# Patient Record
Sex: FEMALE | Race: WHITE | Employment: PART TIME | URBAN - METROPOLITAN AREA
[De-identification: names, ages, dates, MRNs, and addresses within clinical notes are randomized per-mention and may not be internally consistent; named-entity substitution may affect disease eponyms.]

---
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
## 2021-04-13 ENCOUNTER — HOSPITAL ENCOUNTER (EMERGENCY)
Age: 21
Discharge: HOME | End: 2021-04-13
Attending: EMERGENCY MEDICINE

## 2021-04-13 ENCOUNTER — APPOINTMENT (OUTPATIENT)
Dept: GENERAL RADIOLOGY | Age: 21
End: 2021-04-13
Attending: PHYSICIAN ASSISTANT

## 2021-04-13 VITALS
RESPIRATION RATE: 16 BRPM | WEIGHT: 130 LBS | DIASTOLIC BLOOD PRESSURE: 70 MMHG | BODY MASS INDEX: 23.92 KG/M2 | HEIGHT: 62 IN | HEART RATE: 92 BPM | SYSTOLIC BLOOD PRESSURE: 112 MMHG | OXYGEN SATURATION: 99 % | TEMPERATURE: 99.4 F

## 2021-04-13 DIAGNOSIS — S82.892D CLOSED FRACTURE OF LEFT ANKLE WITH ROUTINE HEALING, SUBSEQUENT ENCOUNTER: Primary | ICD-10-CM

## 2021-04-13 DIAGNOSIS — T07.XXXA MULTIPLE BRUISES: ICD-10-CM
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
## 2021-04-13 PROCEDURE — 96372 THER/PROPH/DIAG INJ SC/IM: CPT

## 2021-04-13 PROCEDURE — 74011250636 HC RX REV CODE- 250/636: Performed by: PHYSICIAN ASSISTANT

## 2021-04-13 PROCEDURE — 72220 X-RAY EXAM SACRUM TAILBONE: CPT

## 2021-04-13 PROCEDURE — 99283 EMERGENCY DEPT VISIT LOW MDM: CPT

## 2021-04-13 PROCEDURE — 73610 X-RAY EXAM OF ANKLE: CPT

## 2021-04-13 RX ORDER — ONDANSETRON 4 MG/1
4 TABLET, ORALLY DISINTEGRATING ORAL
Status: ON HOLD | COMMUNITY
Start: 2021-04-12 | End: 2021-04-19 | Stop reason: SDUPTHER

## 2021-04-13 RX ORDER — OXYCODONE HYDROCHLORIDE 5 MG/1
5 TABLET ORAL
COMMUNITY
Start: 2021-04-12 | End: 2021-04-19

## 2021-04-13 RX ORDER — METHOCARBAMOL 750 MG/1
750 TABLET, FILM COATED ORAL 3 TIMES DAILY
Qty: 30 TAB | Refills: 0 | Status: SHIPPED | OUTPATIENT
Start: 2021-04-13 | End: 2021-04-23

## 2021-04-13 RX ORDER — HYDROMORPHONE HYDROCHLORIDE 1 MG/ML
1 INJECTION, SOLUTION INTRAMUSCULAR; INTRAVENOUS; SUBCUTANEOUS
Status: COMPLETED | OUTPATIENT
Start: 2021-04-13 | End: 2021-04-13

## 2021-04-13 RX ADMIN — HYDROMORPHONE HYDROCHLORIDE 1 MG: 1 INJECTION, SOLUTION INTRAMUSCULAR; INTRAVENOUS; SUBCUTANEOUS at 21:28

## 2021-04-14 NOTE — ED NOTES
I have reviewed discharge instructions with the patient. The patient verbalized understanding. Patient left ED via Discharge Method: ambulatory to Home with parent. Opportunity for questions and clarification provided. Patient given 1 scripts.
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
## 2021-04-14 NOTE — DISCHARGE INSTRUCTIONS
Ice to all sore areas, use at home meds as directed alternate Tylenol Motrin for any breakthrough pain, may also use muscle relaxers for pain, use daily stool softner for constipation, call ortho office in am, case management will call you tomorrow to help with follow up appt
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
## 2021-04-18 ENCOUNTER — ANESTHESIA EVENT (OUTPATIENT)
Dept: SURGERY | Age: 21
End: 2021-04-18

## 2021-04-19 ENCOUNTER — APPOINTMENT (OUTPATIENT)
Dept: GENERAL RADIOLOGY | Age: 21
End: 2021-04-19
Attending: ORTHOPAEDIC SURGERY

## 2021-04-19 ENCOUNTER — ANESTHESIA (OUTPATIENT)
Dept: SURGERY | Age: 21
End: 2021-04-19

## 2021-04-19 ENCOUNTER — HOSPITAL ENCOUNTER (OUTPATIENT)
Age: 21
Setting detail: OUTPATIENT SURGERY
Discharge: HOME | End: 2021-04-19
Attending: ORTHOPAEDIC SURGERY | Admitting: ORTHOPAEDIC SURGERY
Payer: COMMERCIAL

## 2021-04-19 VITALS
RESPIRATION RATE: 16 BRPM | DIASTOLIC BLOOD PRESSURE: 65 MMHG | HEART RATE: 88 BPM | OXYGEN SATURATION: 98 % | BODY MASS INDEX: 24.14 KG/M2 | SYSTOLIC BLOOD PRESSURE: 104 MMHG | TEMPERATURE: 98.6 F | WEIGHT: 132 LBS

## 2021-04-19 DIAGNOSIS — S82.842A BIMALLEOLAR ANKLE FRACTURE, LEFT, CLOSED, INITIAL ENCOUNTER: Primary | ICD-10-CM

## 2021-04-19 LAB
ABO + RH BLD: NORMAL
BASOPHILS # BLD: 0 K/UL (ref 0–0.2)
BASOPHILS NFR BLD: 1 % (ref 0–2)
BLOOD GROUP ANTIBODIES SERPL: NORMAL
DIFFERENTIAL METHOD BLD: NORMAL
EOSINOPHIL # BLD: 0.4 K/UL (ref 0–0.8)
EOSINOPHIL NFR BLD: 5 % (ref 0.5–7.8)
ERYTHROCYTE [DISTWIDTH] IN BLOOD BY AUTOMATED COUNT: 12.7 % (ref 11.9–14.6)
HCG UR QL: NEGATIVE
HCT VFR BLD AUTO: 41 % (ref 35.8–46.3)
HGB BLD-MCNC: 13.8 G/DL (ref 11.7–15.4)
IMM GRANULOCYTES # BLD AUTO: 0 K/UL (ref 0–0.5)
IMM GRANULOCYTES NFR BLD AUTO: 0 % (ref 0–5)
LYMPHOCYTES # BLD: 2.4 K/UL (ref 0.5–4.6)
LYMPHOCYTES NFR BLD: 36 % (ref 13–44)
MCH RBC QN AUTO: 30.2 PG (ref 26.1–32.9)
MCHC RBC AUTO-ENTMCNC: 33.7 G/DL (ref 31.4–35)
MCV RBC AUTO: 89.7 FL (ref 79.6–97.8)
MONOCYTES # BLD: 0.6 K/UL (ref 0.1–1.3)
MONOCYTES NFR BLD: 9 % (ref 4–12)
NEUTS SEG # BLD: 3.4 K/UL (ref 1.7–8.2)
NEUTS SEG NFR BLD: 50 % (ref 43–78)
NRBC # BLD: 0 K/UL (ref 0–0.2)
PLATELET # BLD AUTO: 248 K/UL (ref 150–450)
PMV BLD AUTO: 11 FL (ref 9.4–12.3)
RBC # BLD AUTO: 4.57 M/UL (ref 4.05–5.2)
SPECIMEN EXP DATE BLD: NORMAL
WBC # BLD AUTO: 6.8 K/UL (ref 4.3–11.1)

## 2021-04-19 PROCEDURE — 77030040922 HC BLNKT HYPOTHRM STRY -A: Performed by: ANESTHESIOLOGY

## 2021-04-19 PROCEDURE — 76010000162 HC OR TIME 1.5 TO 2 HR INTENSV-TIER 1: Performed by: ORTHOPAEDIC SURGERY

## 2021-04-19 PROCEDURE — 74011000250 HC RX REV CODE- 250: Performed by: NURSE ANESTHETIST, CERTIFIED REGISTERED

## 2021-04-19 PROCEDURE — 86901 BLOOD TYPING SEROLOGIC RH(D): CPT

## 2021-04-19 PROCEDURE — 2709999900 HC NON-CHARGEABLE SUPPLY: Performed by: ORTHOPAEDIC SURGERY

## 2021-04-19 PROCEDURE — 73610 X-RAY EXAM OF ANKLE: CPT

## 2021-04-19 PROCEDURE — 77030010509 HC AIRWY LMA MSK TELE -A: Performed by: ANESTHESIOLOGY

## 2021-04-19 PROCEDURE — C1713 ANCHOR/SCREW BN/BN,TIS/BN: HCPCS | Performed by: ORTHOPAEDIC SURGERY

## 2021-04-19 PROCEDURE — 73600 X-RAY EXAM OF ANKLE: CPT

## 2021-04-19 PROCEDURE — 77030034760 HC NDL BN MAR ASPIR JAMSH STRY -B: Performed by: ORTHOPAEDIC SURGERY

## 2021-04-19 PROCEDURE — 81025 URINE PREGNANCY TEST: CPT

## 2021-04-19 PROCEDURE — 74011250636 HC RX REV CODE- 250/636: Performed by: NURSE ANESTHETIST, CERTIFIED REGISTERED

## 2021-04-19 PROCEDURE — 74011000250 HC RX REV CODE- 250: Performed by: ANESTHESIOLOGY

## 2021-04-19 PROCEDURE — 77030008462 HC STPLR SKN PROX J&J -A: Performed by: ORTHOPAEDIC SURGERY

## 2021-04-19 PROCEDURE — 74011250637 HC RX REV CODE- 250/637: Performed by: ORTHOPAEDIC SURGERY

## 2021-04-19 PROCEDURE — 77030017016 HC DSG ANTIMIC BARR2 S&N -B: Performed by: ORTHOPAEDIC SURGERY

## 2021-04-19 PROCEDURE — 27814 TREATMENT OF ANKLE FRACTURE: CPT | Performed by: ORTHOPAEDIC SURGERY

## 2021-04-19 PROCEDURE — 27829 TREAT LOWER LEG JOINT: CPT | Performed by: ORTHOPAEDIC SURGERY

## 2021-04-19 PROCEDURE — 74011250636 HC RX REV CODE- 250/636: Performed by: ORTHOPAEDIC SURGERY

## 2021-04-19 PROCEDURE — 85025 COMPLETE CBC W/AUTO DIFF WBC: CPT

## 2021-04-19 PROCEDURE — 76060000034 HC ANESTHESIA 1.5 TO 2 HR: Performed by: ORTHOPAEDIC SURGERY

## 2021-04-19 PROCEDURE — 77030033067 HC SUT PDO STRATFX SPIR J&J -B: Performed by: ORTHOPAEDIC SURGERY

## 2021-04-19 PROCEDURE — 76210000017 HC OR PH I REC 1.5 TO 2 HR: Performed by: ORTHOPAEDIC SURGERY

## 2021-04-19 PROCEDURE — 74011250637 HC RX REV CODE- 250/637: Performed by: ANESTHESIOLOGY

## 2021-04-19 PROCEDURE — 76010010054 HC POST OP PAIN BLOCK

## 2021-04-19 PROCEDURE — 77030003602 HC NDL NRV BLK BBMI -B: Performed by: ANESTHESIOLOGY

## 2021-04-19 PROCEDURE — 76210000020 HC REC RM PH II FIRST 0.5 HR: Performed by: ORTHOPAEDIC SURGERY

## 2021-04-19 PROCEDURE — 76942 ECHO GUIDE FOR BIOPSY: CPT | Performed by: ORTHOPAEDIC SURGERY

## 2021-04-19 PROCEDURE — 76010010054 HC POST OP PAIN BLOCK: Performed by: ORTHOPAEDIC SURGERY

## 2021-04-19 PROCEDURE — 77030003862 HC BIT DRL SYNT -B: Performed by: ORTHOPAEDIC SURGERY

## 2021-04-19 PROCEDURE — 77030000032 HC CUF TRNQT ZIMM -B: Performed by: ORTHOPAEDIC SURGERY

## 2021-04-19 PROCEDURE — 77030029637: Performed by: ORTHOPAEDIC SURGERY

## 2021-04-19 PROCEDURE — 74011250636 HC RX REV CODE- 250/636: Performed by: ANESTHESIOLOGY

## 2021-04-19 DEVICE — 3.5MM CORTEX SCREW SELF-TAPPING 12MM: Type: IMPLANTABLE DEVICE | Site: ANKLE | Status: FUNCTIONAL

## 2021-04-19 DEVICE — 3.5MM LOCKING SCREW SLF-TPNG W/STARDRIVE(TM) RECESS 16MM: Type: IMPLANTABLE DEVICE | Site: ANKLE | Status: FUNCTIONAL

## 2021-04-19 DEVICE — PLATE BNE L117MM 10 H S STL 1/3 TBLR LOK COMPR W/ CLLR FOR: Type: IMPLANTABLE DEVICE | Site: ANKLE | Status: FUNCTIONAL

## 2021-04-19 DEVICE — SCREW BNE L16MM DIA3.5MM CORT S STL ST NONCANNULATED LOK: Type: IMPLANTABLE DEVICE | Site: ANKLE | Status: FUNCTIONAL

## 2021-04-19 DEVICE — SCREW BNE L14MM DIA3.5MM CORT S STL ST LOK FULL THRD: Type: IMPLANTABLE DEVICE | Site: ANKLE | Status: FUNCTIONAL

## 2021-04-19 DEVICE — 3.5MM LOCKING SCREW SLF-TPNG W/STARDRIVE(TM) RECESS 12MM: Type: IMPLANTABLE DEVICE | Site: ANKLE | Status: FUNCTIONAL

## 2021-04-19 DEVICE — SCREW BNE L40MM DIA4MM CANC S STL PARTIALLY THRD SM HEX: Type: IMPLANTABLE DEVICE | Site: ANKLE | Status: FUNCTIONAL

## 2021-04-19 RX ORDER — SODIUM CHLORIDE, SODIUM LACTATE, POTASSIUM CHLORIDE, CALCIUM CHLORIDE 600; 310; 30; 20 MG/100ML; MG/100ML; MG/100ML; MG/100ML
150 INJECTION, SOLUTION INTRAVENOUS CONTINUOUS
Status: DISCONTINUED | OUTPATIENT
Start: 2021-04-19 | End: 2021-04-19 | Stop reason: HOSPADM

## 2021-04-19 RX ORDER — HYDROMORPHONE HYDROCHLORIDE 1 MG/ML
0.5 INJECTION, SOLUTION INTRAMUSCULAR; INTRAVENOUS; SUBCUTANEOUS
Status: DISCONTINUED | OUTPATIENT
Start: 2021-04-19 | End: 2021-04-19 | Stop reason: HOSPADM

## 2021-04-19 RX ORDER — PROPOFOL 10 MG/ML
INJECTION, EMULSION INTRAVENOUS AS NEEDED
Status: DISCONTINUED | OUTPATIENT
Start: 2021-04-19 | End: 2021-04-19 | Stop reason: HOSPADM

## 2021-04-19 RX ORDER — HYDROCODONE BITARTRATE AND ACETAMINOPHEN 5; 325 MG/1; MG/1
1 TABLET ORAL AS NEEDED
Status: DISCONTINUED | OUTPATIENT
Start: 2021-04-19 | End: 2021-04-19 | Stop reason: HOSPADM

## 2021-04-19 RX ORDER — DEXAMETHASONE SODIUM PHOSPHATE 4 MG/ML
INJECTION, SOLUTION INTRA-ARTICULAR; INTRALESIONAL; INTRAMUSCULAR; INTRAVENOUS; SOFT TISSUE
Status: DISCONTINUED | OUTPATIENT
Start: 2021-04-19 | End: 2021-04-19 | Stop reason: HOSPADM

## 2021-04-19 RX ORDER — SODIUM CHLORIDE 9 MG/ML
50 INJECTION, SOLUTION INTRAVENOUS CONTINUOUS
Status: DISCONTINUED | OUTPATIENT
Start: 2021-04-19 | End: 2021-04-19 | Stop reason: HOSPADM

## 2021-04-19 RX ORDER — SODIUM CHLORIDE 0.9 % (FLUSH) 0.9 %
5-40 SYRINGE (ML) INJECTION EVERY 8 HOURS
Status: DISCONTINUED | OUTPATIENT
Start: 2021-04-19 | End: 2021-04-19 | Stop reason: HOSPADM

## 2021-04-19 RX ORDER — DEXAMETHASONE SODIUM PHOSPHATE 4 MG/ML
INJECTION, SOLUTION INTRA-ARTICULAR; INTRALESIONAL; INTRAMUSCULAR; INTRAVENOUS; SOFT TISSUE AS NEEDED
Status: DISCONTINUED | OUTPATIENT
Start: 2021-04-19 | End: 2021-04-19 | Stop reason: HOSPADM

## 2021-04-19 RX ORDER — LIDOCAINE HYDROCHLORIDE 20 MG/ML
INJECTION, SOLUTION EPIDURAL; INFILTRATION; INTRACAUDAL; PERINEURAL AS NEEDED
Status: DISCONTINUED | OUTPATIENT
Start: 2021-04-19 | End: 2021-04-19 | Stop reason: HOSPADM

## 2021-04-19 RX ORDER — ACETAMINOPHEN 500 MG
1000 TABLET ORAL
Status: DISCONTINUED | OUTPATIENT
Start: 2021-04-19 | End: 2021-04-19 | Stop reason: HOSPADM

## 2021-04-19 RX ORDER — OXYCODONE AND ACETAMINOPHEN 5; 325 MG/1; MG/1
2 TABLET ORAL
Qty: 60 TAB | Refills: 0 | Status: SHIPPED | OUTPATIENT
Start: 2021-04-19 | End: 2021-04-24

## 2021-04-19 RX ORDER — FENTANYL CITRATE 50 UG/ML
100 INJECTION, SOLUTION INTRAMUSCULAR; INTRAVENOUS ONCE
Status: COMPLETED | OUTPATIENT
Start: 2021-04-19 | End: 2021-04-19

## 2021-04-19 RX ORDER — LIDOCAINE HYDROCHLORIDE 10 MG/ML
0.1 INJECTION INFILTRATION; PERINEURAL AS NEEDED
Status: DISCONTINUED | OUTPATIENT
Start: 2021-04-19 | End: 2021-04-19 | Stop reason: HOSPADM

## 2021-04-19 RX ORDER — FAMOTIDINE 20 MG/1
20 TABLET, FILM COATED ORAL ONCE
Status: COMPLETED | OUTPATIENT
Start: 2021-04-19 | End: 2021-04-19

## 2021-04-19 RX ORDER — SODIUM CHLORIDE 0.9 % (FLUSH) 0.9 %
5-40 SYRINGE (ML) INJECTION AS NEEDED
Status: DISCONTINUED | OUTPATIENT
Start: 2021-04-19 | End: 2021-04-19 | Stop reason: HOSPADM

## 2021-04-19 RX ORDER — CEFAZOLIN SODIUM/WATER 2 G/20 ML
2 SYRINGE (ML) INTRAVENOUS ONCE
Status: COMPLETED | OUTPATIENT
Start: 2021-04-19 | End: 2021-04-19

## 2021-04-19 RX ORDER — ACETAMINOPHEN 500 MG
1000 TABLET ORAL ONCE
Status: COMPLETED | OUTPATIENT
Start: 2021-04-19 | End: 2021-04-19

## 2021-04-19 RX ORDER — MIDAZOLAM HYDROCHLORIDE 1 MG/ML
2 INJECTION, SOLUTION INTRAMUSCULAR; INTRAVENOUS
Status: COMPLETED | OUTPATIENT
Start: 2021-04-19 | End: 2021-04-19

## 2021-04-19 RX ORDER — ONDANSETRON 2 MG/ML
INJECTION INTRAMUSCULAR; INTRAVENOUS AS NEEDED
Status: DISCONTINUED | OUTPATIENT
Start: 2021-04-19 | End: 2021-04-19 | Stop reason: HOSPADM

## 2021-04-19 RX ADMIN — Medication 3 AMPULE: at 06:03

## 2021-04-19 RX ADMIN — ONDANSETRON 4 MG: 2 INJECTION INTRAMUSCULAR; INTRAVENOUS at 08:48

## 2021-04-19 RX ADMIN — HYDROCODONE BITARTRATE AND ACETAMINOPHEN 1 TABLET: 5; 325 TABLET ORAL at 10:16

## 2021-04-19 RX ADMIN — DEXAMETHASONE SODIUM PHOSPHATE 4 MG: 4 INJECTION, SOLUTION INTRAMUSCULAR; INTRAVENOUS at 07:21

## 2021-04-19 RX ADMIN — ROPIVACAINE HYDROCHLORIDE 30 ML: 5 INJECTION, SOLUTION EPIDURAL; INFILTRATION; PERINEURAL at 07:25

## 2021-04-19 RX ADMIN — LIDOCAINE HYDROCHLORIDE 100 MG: 20 INJECTION, SOLUTION EPIDURAL; INFILTRATION; INTRACAUDAL; PERINEURAL at 07:40

## 2021-04-19 RX ADMIN — FENTANYL CITRATE 100 MCG: 50 INJECTION, SOLUTION INTRAMUSCULAR; INTRAVENOUS at 07:06

## 2021-04-19 RX ADMIN — SODIUM CHLORIDE, SODIUM LACTATE, POTASSIUM CHLORIDE, AND CALCIUM CHLORIDE: 600; 310; 30; 20 INJECTION, SOLUTION INTRAVENOUS at 08:45

## 2021-04-19 RX ADMIN — MIDAZOLAM 2 MG: 1 INJECTION INTRAMUSCULAR; INTRAVENOUS at 07:06

## 2021-04-19 RX ADMIN — DEXAMETHASONE SODIUM PHOSPHATE 4 MG: 4 INJECTION, SOLUTION INTRAMUSCULAR; INTRAVENOUS at 08:48

## 2021-04-19 RX ADMIN — ROPIVACAINE HYDROCHLORIDE 30 ML: 5 INJECTION, SOLUTION EPIDURAL; INFILTRATION; PERINEURAL at 07:21

## 2021-04-19 RX ADMIN — DEXAMETHASONE SODIUM PHOSPHATE 4 MG: 4 INJECTION, SOLUTION INTRAMUSCULAR; INTRAVENOUS at 07:25

## 2021-04-19 RX ADMIN — PROMETHAZINE HYDROCHLORIDE 3.25 MG: 25 INJECTION INTRAMUSCULAR; INTRAVENOUS at 10:42

## 2021-04-19 RX ADMIN — FAMOTIDINE 20 MG: 20 TABLET ORAL at 06:04

## 2021-04-19 RX ADMIN — CEFAZOLIN 2 G: 1 INJECTION, POWDER, FOR SOLUTION INTRAVENOUS at 07:49

## 2021-04-19 RX ADMIN — ACETAMINOPHEN 1000 MG: 500 TABLET ORAL at 06:04

## 2021-04-19 RX ADMIN — SODIUM CHLORIDE, SODIUM LACTATE, POTASSIUM CHLORIDE, AND CALCIUM CHLORIDE 150 ML/HR: 600; 310; 30; 20 INJECTION, SOLUTION INTRAVENOUS at 06:04

## 2021-04-19 RX ADMIN — PROPOFOL 200 MG: 10 INJECTION, EMULSION INTRAVENOUS at 07:40

## 2021-04-19 NOTE — ANESTHESIA POSTPROCEDURE EVALUATION
Procedure(s):  LEFT ANKLE OPEN REDUCTION INTERNAL FIXATION. general    Anesthesia Post Evaluation      Multimodal analgesia: multimodal analgesia used between 6 hours prior to anesthesia start to PACU discharge  Patient location during evaluation: bedside  Patient participation: complete - patient participated  Level of consciousness: awake and alert  Pain management: adequate  Airway patency: patent  Anesthetic complications: no  Cardiovascular status: hemodynamically stable  Respiratory status: spontaneous ventilation  Hydration status: euvolemic  Comments: Patient stable and may discharge at this time. Post anesthesia nausea and vomiting:  none  Final Post Anesthesia Temperature Assessment:  Normothermia (36.0-37.5 degrees C)  good result with popliteal and adductor canal blocks. INITIAL Post-op Vital signs:   Vitals Value Taken Time   /65 04/19/21 1101   Temp 37 °C (98.6 °F) 04/19/21 1101   Pulse 84 04/19/21 1102   Resp 18 04/19/21 1101   SpO2 97 % 04/19/21 1102   Vitals shown include unvalidated device data.
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
## 2021-04-19 NOTE — ANESTHESIA PROCEDURE NOTES
Peripheral Block Performed by: Familia Burks MD 
Authorized by: Familia Burks MD  
 
 
Block Type: Assessment: 
 
Injection Assessment:

## 2021-04-19 NOTE — INTERVAL H&P NOTE
Update History & Physical 
 
The Patient's History and Physical of 4/15/2021 was reviewed with the patient and I examined the patient. There was no change. The surgical site was confirmed by the patient and me. Plan:  The risk, benefits, expected outcome, and alternative to the recommended procedure have been discussed with the patient. Patient understands and wants to proceed with open reduction internal fixation of left ankle fracture.  
 
Electronically signed by May Litten, MD on 4/19/2021 at 7:02 AM

## 2021-04-19 NOTE — PROGRESS NOTES
Spiritual Care visit. Initial Visit, Pre Surgery Consult. Visit and prayer before patient goes to surgery.     Visit by Daisy Dean M.Ed., Th.B. ,Staff

## 2021-04-19 NOTE — ANESTHESIA PROCEDURE NOTES
Peripheral Block    Start time: 4/19/2021 7:21 AM  End time: 4/19/2021 7:25 AM  Performed by: Cynthia Matias MD  Authorized by: Cynthia Matias MD       Pre-procedure: Indications: at surgeon's request and post-op pain management    Preanesthetic Checklist: patient identified, risks and benefits discussed, site marked, timeout performed, anesthesia consent given and patient being monitored    Timeout Time: 07:21          Block Type:   Block Type: Adductor canal  Laterality:  Left  Monitoring:  Standard ASA monitoring, responsive to questions, oxygen, continuous pulse ox, frequent vital sign checks and heart rate  Injection Technique:  Single shot  Procedures: ultrasound guided    Patient Position: supine  Prep: chlorhexidine    Location:  Mid thigh  Needle Type:  Stimuplex  Needle Gauge:  22 G  Needle Localization:  Ultrasound guidance  Medication Injected:  Ropivacaine 0.5% with epinephrine 1:200,000 injection, 30 mL (Mixture components: EPINEPHrine HCl (PF) 1 mg/mL (1 mL) Soln, . 005 mL; ropivacaine (PF) 5 mg/mL (0.5 %) Soln, 1 mL)  dexamethasone (DECADRON) 4 mg/mL injection, 4 mg  Med Admin Time: 4/19/2021 7:25 AM    Assessment:  Number of attempts:  1  Injection Assessment:  Incremental injection every 5 mL, local visualized surrounding nerve on ultrasound, negative aspiration for blood, no intravascular symptoms, no paresthesia and ultrasound image on chart  Patient tolerance:  Patient tolerated the procedure well with no immediate complications  All needles out intact, proc. Tolerated well.

## 2021-04-19 NOTE — ANESTHESIA PREPROCEDURE EVALUATION
Relevant Problems   No relevant active problems       Anesthetic History               Review of Systems / Medical History  Patient summary reviewed and pertinent labs reviewed    Pulmonary            Asthma (occas inhaler, last was 2 weeks.)        Neuro/Psych   Within defined limits           Cardiovascular            Dysrhythmias (wpw with svt, last episode 2 years ago.)       Exercise tolerance: >4 METS     GI/Hepatic/Renal     GERD: well controlled           Endo/Other  Within defined limits           Other Findings              Physical Exam    Airway  Mallampati: II  TM Distance: 4 - 6 cm  Neck ROM: normal range of motion   Mouth opening: Normal     Cardiovascular  Regular rate and rhythm,  S1 and S2 normal,  no murmur, click, rub, or gallop  Rhythm: regular  Rate: normal         Dental  No notable dental hx       Pulmonary  Breath sounds clear to auscultation               Abdominal         Other Findings            Anesthetic Plan    ASA: 2  Anesthesia type: general      Post-op pain plan if not by surgeon: peripheral nerve block single    Induction: Intravenous  Anesthetic plan and risks discussed with: Patient and Mother

## 2021-04-19 NOTE — ANESTHESIA PROCEDURE NOTES
Peripheral Block    Start time: 4/19/2021 7:06 AM  End time: 4/19/2021 7:21 AM  Performed by: Luz Marina Lopez MD  Authorized by: Luz Marina Lopez MD       Pre-procedure: Indications: at surgeon's request and post-op pain management    Preanesthetic Checklist: patient identified, risks and benefits discussed, site marked, timeout performed, anesthesia consent given and patient being monitored    Timeout Time: 07:06          Block Type:   Block Type:  Popliteal  Laterality:  Left  Monitoring:  Standard ASA monitoring, responsive to questions, oxygen, continuous pulse ox, frequent vital sign checks and heart rate  Injection Technique:  Single shot  Procedures: ultrasound guided and nerve stimulator    Patient position: lateral decubitus. Prep: chlorhexidine    Location:  Mid thigh  Needle Type:  Stimuplex  Needle Gauge:  22 G  Needle Localization:  Nerve stimulator and ultrasound guidance  Motor Response comment:   Motor Response: minimal motor response >0.4 mA   Medication Injected:  Ropivacaine 0.375% with epi 1:200,000 in NS injection, 30 mL (Mixture components: ropivacaine (PF) 5 mg/mL (0.5 %) Soln, . 75 mL; EPINEPHrine HCl (PF) 1 mg/mL (1 mL) Soln, . 005 mL; 0.9% sodium chloride Soln, .245 mL)  dexamethasone (DECADRON) 4 mg/mL injection, 4 mg  Med Admin Time: 4/19/2021 7:21 AM    Assessment:  Number of attempts:  1  Injection Assessment:  Incremental injection every 5 mL, local visualized surrounding nerve on ultrasound, negative aspiration for blood, no intravascular symptoms, no paresthesia and ultrasound image on chart  Patient tolerance:  Patient tolerated the procedure well with no immediate complications  All needles out intact, proc. Tolerated well.

## 2021-04-19 NOTE — DISCHARGE INSTRUCTIONS
INSTRUCTIONS FOLLOWING FOOT SURGERY    ACTIVITY  Elevate foot (feet) for 48 hours. Use crutches as directed by your doctor. No weight bearing on operative foot. Get up out of bed frequently. While in the bed, move your legs as much as possible. DIET  Clear liquids until no nausea or vomiting; then light diet for the first day. Advance to regular diet on second day, unless your doctor orders otherwise. PAIN  Take pain medications as directed by your doctor. Call your doctor if pain is NOT relieved by medication. DO NOT take aspirin or blood thinners until directed by your doctor. DRESSING CARE  Keep clean and dry until follow up appointment. CALL YOUR DOCTOR IF YOU HAVE  Excessive bleeding that does not stop after holding mild pressure over the area. Temperature of 101 degrees or above. Loss of sensation - cold, white or blue toes. After general anesthesia or intravenous sedation, for 24 hours or while taking prescription Narcotics:  · Limit your activities  · A responsible adult needs to be with you for the next 24 hours  · Do not drive and operate hazardous machinery  · Do not make important personal or business decisions  · Do not drink alcoholic beverages  · If you have not urinated within 8 hours after discharge, and you are experiencing discomfort from urinary retention, please go to the nearest ED. · If you have sleep apnea and have a CPAP machine, please use it for all naps and sleeping. · Please use caution when taking narcotics and any of your home medications that may cause drowsiness. *  Please give a list of your current medications to your Primary Care Provider. *  Please update this list whenever your medications are discontinued, doses are      changed, or new medications (including over-the-counter products) are added. *  Please carry medication information at all times in case of emergency situations.     These are general instructions for a healthy lifestyle:  No smoking/ No tobacco products/ Avoid exposure to second hand smoke  Surgeon General's Warning:  Quitting smoking now greatly reduces serious risk to your health. Obesity, smoking, and sedentary lifestyle greatly increases your risk for illness  A healthy diet, regular physical exercise & weight monitoring are important for maintaining a healthy lifestyle    You may be retaining fluid if you have a history of heart failure or if you experience any of the following symptoms:  Weight gain of 3 pounds or more overnight or 5 pounds in a week, increased swelling in our hands or feet or shortness of breath while lying flat in bed. Please call your doctor as soon as you notice any of these symptoms; do not wait until your next office visit.

## 2021-04-24 ENCOUNTER — APPOINTMENT (OUTPATIENT)
Dept: CT IMAGING | Age: 21
End: 2021-04-24
Attending: EMERGENCY MEDICINE
Payer: COMMERCIAL

## 2021-04-24 ENCOUNTER — HOSPITAL ENCOUNTER (EMERGENCY)
Age: 21
Discharge: HOME | End: 2021-04-25
Attending: EMERGENCY MEDICINE
Payer: COMMERCIAL

## 2021-04-24 DIAGNOSIS — R40.4 TRANSIENT ALTERATION OF AWARENESS: Primary | ICD-10-CM

## 2021-04-24 DIAGNOSIS — N12 PYELONEPHRITIS: ICD-10-CM

## 2021-04-24 LAB
ALBUMIN SERPL-MCNC: 3.2 G/DL (ref 3.5–5)
ALBUMIN/GLOB SERPL: 0.7 {RATIO} (ref 1.2–3.5)
ALP SERPL-CCNC: 190 U/L (ref 50–130)
ALT SERPL-CCNC: 155 U/L (ref 12–65)
AMPHET UR QL SCN: NEGATIVE
ANION GAP SERPL CALC-SCNC: 5 MMOL/L (ref 7–16)
APAP SERPL-MCNC: 5 UG/ML (ref 10–30)
AST SERPL-CCNC: 155 U/L (ref 15–37)
BACTERIA URNS QL MICRO: ABNORMAL /HPF
BARBITURATES UR QL SCN: NEGATIVE
BASOPHILS # BLD: 0 K/UL (ref 0–0.2)
BASOPHILS NFR BLD: 0 % (ref 0–2)
BENZODIAZ UR QL: POSITIVE
BILIRUB SERPL-MCNC: 0.7 MG/DL (ref 0.2–1.1)
BUN SERPL-MCNC: 10 MG/DL (ref 6–23)
CALCIUM SERPL-MCNC: 9 MG/DL (ref 8.3–10.4)
CANNABINOIDS UR QL SCN: POSITIVE
CASTS URNS QL MICRO: ABNORMAL /LPF
CHLORIDE SERPL-SCNC: 106 MMOL/L (ref 98–107)
CO2 SERPL-SCNC: 26 MMOL/L (ref 21–32)
COCAINE UR QL SCN: NEGATIVE
CREAT SERPL-MCNC: 0.45 MG/DL (ref 0.6–1)
CRYSTALS URNS QL MICRO: ABNORMAL /LPF
DIFFERENTIAL METHOD BLD: ABNORMAL
EOSINOPHIL # BLD: 0.2 K/UL (ref 0–0.8)
EOSINOPHIL NFR BLD: 2 % (ref 0.5–7.8)
EPI CELLS #/AREA URNS HPF: ABNORMAL /HPF
ERYTHROCYTE [DISTWIDTH] IN BLOOD BY AUTOMATED COUNT: 13 % (ref 11.9–14.6)
ETHANOL SERPL-MCNC: <3 MG/DL
GLOBULIN SER CALC-MCNC: 4.3 G/DL (ref 2.3–3.5)
GLUCOSE SERPL-MCNC: 117 MG/DL (ref 65–100)
HCG UR QL: NEGATIVE
HCG UR QL: NEGATIVE
HCT VFR BLD AUTO: 37 % (ref 35.8–46.3)
HGB BLD-MCNC: 12.2 G/DL (ref 11.7–15.4)
IMM GRANULOCYTES # BLD AUTO: 0 K/UL (ref 0–0.5)
IMM GRANULOCYTES NFR BLD AUTO: 0 % (ref 0–5)
LACTATE SERPL-SCNC: 0.8 MMOL/L (ref 0.4–2)
LYMPHOCYTES # BLD: 1.3 K/UL (ref 0.5–4.6)
LYMPHOCYTES NFR BLD: 12 % (ref 13–44)
MAGNESIUM SERPL-MCNC: 2.3 MG/DL (ref 1.8–2.4)
MCH RBC QN AUTO: 29.4 PG (ref 26.1–32.9)
MCHC RBC AUTO-ENTMCNC: 33 G/DL (ref 31.4–35)
MCV RBC AUTO: 89.2 FL (ref 79.6–97.8)
METHADONE UR QL: NEGATIVE
MONOCYTES # BLD: 1 K/UL (ref 0.1–1.3)
MONOCYTES NFR BLD: 9 % (ref 4–12)
MUCOUS THREADS URNS QL MICRO: 0 /LPF
NEUTS SEG # BLD: 7.9 K/UL (ref 1.7–8.2)
NEUTS SEG NFR BLD: 76 % (ref 43–78)
NRBC # BLD: 0 K/UL (ref 0–0.2)
OPIATES UR QL: POSITIVE
PCP UR QL: NEGATIVE
PLATELET # BLD AUTO: 290 K/UL (ref 150–450)
PMV BLD AUTO: 10.9 FL (ref 9.4–12.3)
POTASSIUM SERPL-SCNC: 4.1 MMOL/L (ref 3.5–5.1)
PROT SERPL-MCNC: 7.5 G/DL (ref 6.3–8.2)
RBC # BLD AUTO: 4.15 M/UL (ref 4.05–5.2)
RBC #/AREA URNS HPF: ABNORMAL /HPF
SODIUM SERPL-SCNC: 137 MMOL/L (ref 136–145)
WBC # BLD AUTO: 10.4 K/UL (ref 4.3–11.1)
WBC URNS QL MICRO: ABNORMAL /HPF
YEAST URNS QL MICRO: ABNORMAL

## 2021-04-24 PROCEDURE — 82077 ASSAY SPEC XCP UR&BREATH IA: CPT

## 2021-04-24 PROCEDURE — 70450 CT HEAD/BRAIN W/O DYE: CPT

## 2021-04-24 PROCEDURE — 83605 ASSAY OF LACTIC ACID: CPT

## 2021-04-24 PROCEDURE — 51798 US URINE CAPACITY MEASURE: CPT

## 2021-04-24 PROCEDURE — 81025 URINE PREGNANCY TEST: CPT

## 2021-04-24 PROCEDURE — 85025 COMPLETE CBC W/AUTO DIFF WBC: CPT

## 2021-04-24 PROCEDURE — 74011250637 HC RX REV CODE- 250/637: Performed by: EMERGENCY MEDICINE

## 2021-04-24 PROCEDURE — 93005 ELECTROCARDIOGRAM TRACING: CPT | Performed by: EMERGENCY MEDICINE

## 2021-04-24 PROCEDURE — 83735 ASSAY OF MAGNESIUM: CPT

## 2021-04-24 PROCEDURE — 96365 THER/PROPH/DIAG IV INF INIT: CPT

## 2021-04-24 PROCEDURE — 51701 INSERT BLADDER CATHETER: CPT

## 2021-04-24 PROCEDURE — 80143 DRUG ASSAY ACETAMINOPHEN: CPT

## 2021-04-24 PROCEDURE — 74011000258 HC RX REV CODE- 258: Performed by: EMERGENCY MEDICINE

## 2021-04-24 PROCEDURE — 81003 URINALYSIS AUTO W/O SCOPE: CPT

## 2021-04-24 PROCEDURE — 80307 DRUG TEST PRSMV CHEM ANLYZR: CPT

## 2021-04-24 PROCEDURE — 80053 COMPREHEN METABOLIC PANEL: CPT

## 2021-04-24 PROCEDURE — 81001 URINALYSIS AUTO W/SCOPE: CPT

## 2021-04-24 PROCEDURE — 74011250636 HC RX REV CODE- 250/636: Performed by: EMERGENCY MEDICINE

## 2021-04-24 PROCEDURE — 96375 TX/PRO/DX INJ NEW DRUG ADDON: CPT

## 2021-04-24 PROCEDURE — 99285 EMERGENCY DEPT VISIT HI MDM: CPT

## 2021-04-24 RX ORDER — KETOROLAC TROMETHAMINE 30 MG/ML
30 INJECTION, SOLUTION INTRAMUSCULAR; INTRAVENOUS
Status: COMPLETED | OUTPATIENT
Start: 2021-04-24 | End: 2021-04-24

## 2021-04-24 RX ORDER — CEPHALEXIN 500 MG/1
500 CAPSULE ORAL 4 TIMES DAILY
Qty: 28 CAP | Refills: 0 | Status: SHIPPED | OUTPATIENT
Start: 2021-04-24 | End: 2021-05-04

## 2021-04-24 RX ORDER — HYDROCODONE BITARTRATE AND ACETAMINOPHEN 5; 325 MG/1; MG/1
1-2 TABLET ORAL
Qty: 20 TAB | Refills: 0 | Status: SHIPPED | OUTPATIENT
Start: 2021-04-24 | End: 2021-04-27

## 2021-04-24 RX ORDER — HYDROCODONE BITARTRATE AND ACETAMINOPHEN 10; 325 MG/1; MG/1
1 TABLET ORAL
Status: COMPLETED | OUTPATIENT
Start: 2021-04-24 | End: 2021-04-24

## 2021-04-24 RX ORDER — ONDANSETRON 8 MG/1
8 TABLET, ORALLY DISINTEGRATING ORAL
Qty: 12 TAB | Refills: 1 | Status: SHIPPED | OUTPATIENT
Start: 2021-04-24 | End: 2021-06-02

## 2021-04-24 RX ADMIN — KETOROLAC TROMETHAMINE 30 MG: 30 INJECTION, SOLUTION INTRAMUSCULAR; INTRAVENOUS at 21:59

## 2021-04-24 RX ADMIN — HYDROCODONE BITARTRATE AND ACETAMINOPHEN 1 TABLET: 10; 325 TABLET ORAL at 22:00

## 2021-04-24 RX ADMIN — CEFTRIAXONE 1 G: 1 INJECTION, POWDER, FOR SOLUTION INTRAMUSCULAR; INTRAVENOUS at 21:59

## 2021-04-24 RX ADMIN — SODIUM CHLORIDE 1000 ML: 900 INJECTION, SOLUTION INTRAVENOUS at 21:59
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
## 2021-04-24 NOTE — ED NOTES
Report from Ajith, [facility]. Assumed care of pt at this time.

## 2021-04-25 VITALS
DIASTOLIC BLOOD PRESSURE: 70 MMHG | RESPIRATION RATE: 18 BRPM | SYSTOLIC BLOOD PRESSURE: 119 MMHG | TEMPERATURE: 97.6 F | HEART RATE: 104 BPM | HEIGHT: 62 IN | OXYGEN SATURATION: 100 % | BODY MASS INDEX: 24.29 KG/M2 | WEIGHT: 132 LBS

## 2021-04-25 LAB
ATRIAL RATE: 75 BPM
CALCULATED P AXIS, ECG09: 41 DEGREES
CALCULATED R AXIS, ECG10: 58 DEGREES
CALCULATED T AXIS, ECG11: 25 DEGREES
DIAGNOSIS, 93000: NORMAL
P-R INTERVAL, ECG05: 154 MS
Q-T INTERVAL, ECG07: 354 MS
QRS DURATION, ECG06: 82 MS
QTC CALCULATION (BEZET), ECG08: 395 MS
VENTRICULAR RATE, ECG03: 75 BPM

## 2021-04-25 PROCEDURE — 74011250636 HC RX REV CODE- 250/636: Performed by: EMERGENCY MEDICINE

## 2021-04-25 RX ORDER — ONDANSETRON 2 MG/ML
4 INJECTION INTRAMUSCULAR; INTRAVENOUS
Status: COMPLETED | OUTPATIENT
Start: 2021-04-25 | End: 2021-04-25

## 2021-04-25 RX ADMIN — ONDANSETRON 4 MG: 2 INJECTION INTRAMUSCULAR; INTRAVENOUS at 00:34

## 2021-04-25 NOTE — ED NOTES
I have reviewed discharge instructions with the patient. The patient verbalized understanding. Patient left ED via Discharge Method: wheelchair to Home with family. Opportunity for questions and clarification provided. Patient given 3 scripts. To continue your aftercare when you leave the hospital, you may receive an automated call from our care team to check in on how you are doing. This is a free service and part of our promise to provide the best care and service to meet your aftercare needs.  If you have questions, or wish to unsubscribe from this service please call [phone]. Thank you for Choosing our [facility] Department.
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
## 2021-05-01 ENCOUNTER — HOSPITAL ENCOUNTER (EMERGENCY)
Age: 21
Discharge: HOME | End: 2021-05-01
Attending: EMERGENCY MEDICINE
Payer: COMMERCIAL

## 2021-05-01 VITALS
WEIGHT: 125 LBS | SYSTOLIC BLOOD PRESSURE: 101 MMHG | TEMPERATURE: 98.4 F | RESPIRATION RATE: 16 BRPM | HEART RATE: 96 BPM | OXYGEN SATURATION: 99 % | DIASTOLIC BLOOD PRESSURE: 71 MMHG | HEIGHT: 62 IN | BODY MASS INDEX: 23 KG/M2

## 2021-05-01 DIAGNOSIS — B37.31 VAGINAL CANDIDIASIS: Primary | ICD-10-CM

## 2021-05-01 LAB
BACTERIA URNS QL MICRO: 0 /HPF
CASTS URNS QL MICRO: 0 /LPF
CRYSTALS URNS QL MICRO: NORMAL /LPF
EPI CELLS #/AREA URNS HPF: NORMAL /HPF
MUCOUS THREADS URNS QL MICRO: NORMAL /LPF
RBC #/AREA URNS HPF: 0 /HPF
SERVICE CMNT-IMP: NORMAL
WBC URNS QL MICRO: 0 /HPF
WET PREP GENITAL: NORMAL
YEAST URNS QL MICRO: NORMAL

## 2021-05-01 PROCEDURE — 87210 SMEAR WET MOUNT SALINE/INK: CPT

## 2021-05-01 PROCEDURE — 99283 EMERGENCY DEPT VISIT LOW MDM: CPT

## 2021-05-01 PROCEDURE — 81015 MICROSCOPIC EXAM OF URINE: CPT

## 2021-05-01 PROCEDURE — 81003 URINALYSIS AUTO W/O SCOPE: CPT

## 2021-05-01 RX ORDER — FLUCONAZOLE 150 MG/1
150 TABLET ORAL
Qty: 2 TAB | Refills: 0 | Status: SHIPPED | OUTPATIENT
Start: 2021-05-01 | End: 2021-05-01

## 2021-05-01 NOTE — ED NOTES
I have reviewed discharge instructions with the patient. The patient verbalized understanding. Patient left ED via Discharge Method: ambulatory to Home with family. Opportunity for questions and clarification provided. Patient given 1 scripts. To continue your aftercare when you leave the hospital, you may receive an automated call from our care team to check in on how you are doing. This is a free service and part of our promise to provide the best care and service to meet your aftercare needs.  If you have questions, or wish to unsubscribe from this service please call [phone]. Thank you for Choosing our [address] [place] [address] Emergency Department.

## 2021-05-01 NOTE — ED TRIAGE NOTES
Presents complaining of white vaginal discharge, urinary pain, bilateral flank, lower back and abdominal pain. Reports recent UTI diagnosis. Mask applied.

## 2021-05-01 NOTE — ED PROVIDER NOTES
Patient is a 80-year-old female who presents to the emergency room with her mother who answers most of her questions for her, they are here because the patient is having some bilateral flank pain and some suprapubic pain. She was seen here recently for similar symptoms. Past Medical History:   Diagnosis Date    Asthma     Eczema     Esophagitis, eosinophilic     Gastrointestinal disorder     esophageal disorder    Other ill-defined conditions(459.89)     eczema    León-Parkinson-White syndrome        Past Surgical History:   Procedure Laterality Date    HX APPENDECTOMY  2016    HX CHOLECYSTECTOMY  2016         No family history on file.     Social History     Socioeconomic History    Marital status: SINGLE     Spouse name: Not on file    Number of children: Not on file    Years of education: Not on file    Highest education level: Not on file   Occupational History    Not on file   Social Needs    Financial resource strain: Not on file    Food insecurity     Worry: Not on file     Inability: Not on file    Transportation needs     Medical: Not on file     Non-medical: Not on file   Tobacco Use    Smoking status: Never Smoker    Smokeless tobacco: Never Used   Substance and Sexual Activity    Alcohol use: No    Drug use: Yes     Types: Marijuana    Sexual activity: Never   Lifestyle    Physical activity     Days per week: Not on file     Minutes per session: Not on file    Stress: Not on file   Relationships    Social connections     Talks on phone: Not on file     Gets together: Not on file     Attends Rastafari service: Not on file     Active member of club or organization: Not on file     Attends meetings of clubs or organizations: Not on file     Relationship status: Not on file    Intimate partner violence     Fear of current or ex partner: Not on file     Emotionally abused: Not on file     Physically abused: Not on file     Forced sexual activity: Not on file   Other Topics Concern    Not on file   Social History Narrative    Not on file         ALLERGIES: Latex and Pork derived (porcine)    Review of Systems   Constitutional: Negative for chills and fever. HENT: Negative for facial swelling. Respiratory: Negative for chest tightness and shortness of breath. Cardiovascular: Negative for chest pain. Gastrointestinal: Negative for abdominal pain, nausea and vomiting. Genitourinary: Positive for decreased urine volume and vaginal discharge. Negative for dysuria. Musculoskeletal: Negative for myalgias. Neurological: Negative for headaches. Psychiatric/Behavioral: Negative for confusion. All other systems reviewed and are negative. Vitals:    05/01/21 0932   BP: 101/71   Pulse: 96   Resp: 16   Temp: 98.4 °F (36.9 °C)   SpO2: 99%   Weight: 56.7 kg (125 lb)   Height: 5' 2\" (1.575 m)            Physical Exam  Vitals signs and nursing note reviewed. Constitutional:       Appearance: Normal appearance. HENT:      Head: Normocephalic and atraumatic. Mouth/Throat:      Mouth: Mucous membranes are moist.   Eyes:      Pupils: Pupils are equal, round, and reactive to light. Cardiovascular:      Rate and Rhythm: Normal rate and regular rhythm. Pulmonary:      Effort: No respiratory distress. Breath sounds: Normal breath sounds. Abdominal:      Palpations: Abdomen is soft. Tenderness: There is no abdominal tenderness. There is right CVA tenderness and left CVA tenderness. There is no guarding. Genitourinary:     Comments: Exam deferred  Skin:     General: Skin is warm and dry. Neurological:      Mental Status: She is alert and oriented to person, place, and time. Mental status is at baseline. Psychiatric:         Mood and Affect: Mood normal.          MDM  Number of Diagnoses or Management Options  Diagnosis management comments:  This is a 61-year-old female who presents to emergency room with chief complaint of bilateral flank pain, suprapubic pain and some scant white discharge. She was seen here in this ED several days ago, given antibiotics for UTI at that time. Patient states she took her prescription, was not able to keep the first few doses down due to nausea. Today her urinalysis is unremarkable, it is negative for nitrites, leukocytes, bacteria, however there is a concentrated specific gravity. Patient endorses drinking very little the last several days. Wet prep demonstrates presence of yeast, does not show any white blood cells, clue cells, trichomonas. Will treat for candidal infection.          Procedures

## 2021-06-02 ENCOUNTER — HOSPITAL ENCOUNTER (EMERGENCY)
Age: 21
Discharge: HOME | DRG: 493 | End: 2021-06-02
Attending: EMERGENCY MEDICINE
Payer: COMMERCIAL

## 2021-06-02 VITALS
TEMPERATURE: 99 F | HEIGHT: 62 IN | DIASTOLIC BLOOD PRESSURE: 80 MMHG | SYSTOLIC BLOOD PRESSURE: 118 MMHG | WEIGHT: 120 LBS | HEART RATE: 85 BPM | OXYGEN SATURATION: 100 % | BODY MASS INDEX: 22.08 KG/M2 | RESPIRATION RATE: 18 BRPM

## 2021-06-02 DIAGNOSIS — T81.30XA WOUND DEHISCENCE: Primary | ICD-10-CM

## 2021-06-02 PROCEDURE — 99282 EMERGENCY DEPT VISIT SF MDM: CPT

## 2021-06-02 RX ORDER — ALBUTEROL SULFATE 90 UG/1
AEROSOL, METERED RESPIRATORY (INHALATION)
COMMUNITY
Start: 2021-03-29 | End: 2021-10-16

## 2021-06-02 RX ORDER — SERTRALINE HYDROCHLORIDE 100 MG/1
100 TABLET, FILM COATED ORAL
COMMUNITY

## 2021-06-03 ENCOUNTER — ANESTHESIA EVENT (OUTPATIENT)
Dept: SURGERY | Age: 21
DRG: 493 | End: 2021-06-03
Payer: COMMERCIAL

## 2021-06-03 NOTE — H&P (VIEW-ONLY)
Consult    Patient: Chad Gustafson MRN: [id number]  SSN: [id number]    YOB: 2000  Age: 21 y.o. Sex: female      Subjective:      Chad Gustafson is a 21 y.o. female is about 6 weeks out from open reduction internal fixation of a left bimalleolar ankle fracture with fixation of her left distal tib-fib syndesmosis. She is here today with drainage from her left lateral wound. In roughly the central portion of her wound she is having purulent drainage from this. She is having pain but she has had pain ever since the time of her surgery she was hard to know if this is really worse or not. She has not had any obvious fevers. Past Medical History:   Diagnosis Date    Asthma     Eczema     Esophagitis, eosinophilic     Gastrointestinal disorder     esophageal disorder    Other ill-defined conditions(799.89)     eczema    León-Parkinson-White syndrome      Past Surgical History:   Procedure Laterality Date    HX APPENDECTOMY  2016    HX CHOLECYSTECTOMY  2016      FAMHX -No history of inflammatory arthritis   Social History     Tobacco Use    Smoking status: Never Smoker    Smokeless tobacco: Never Used   Substance Use Topics    Alcohol use: No      Current Outpatient Medications   Medication Sig Dispense Refill    albuterol (PROVENTIL HFA, VENTOLIN HFA, PROAIR HFA) 90 mcg/actuation inhaler TAKE 2 PUFFS, 2 4 TIMES PER DAY AS NEEDED.  sertraline (ZOLOFT) 100 mg tablet 100 mg.      ALBUTEROL 90 mcg/Actuation inhaler take 2 Puffs by inhalation every six (6) hours as needed for Wheezing. Allergies   Allergen Reactions    Latex Rash    Pork Derived (Porcine) Other (comments)     Because of GI disorder       Review of Systems:  A comprehensive review of systems was negative except for that written in the History of Present Illness. Objective: There were no vitals filed for this visit.      Physical Exam:  Physical Exam:  General:  Alert, cooperative, no distress, appears stated age. Orientation she is alert and oriented person place time and situation   Eyes:  Conjunctivae/corneas clear. PERRL, EOMs intact. Fundi benign   Ears:  Normal TMs and external ear canals both ears. Nose: Nares normal. Septum midline. Mucosa normal. No drainage or sinus tenderness. Mouth/Throat: Lips, mucosa, and tongue normal. Teeth and gums normal.   Neck: Supple, symmetrical, trachea midline, no adenopathy, thyroid: no enlargment/tenderness/nodules, no carotid bruit and no JVD. Back:   Symmetric, no curvature. ROM normal. No CVA tenderness. Lungs:   Clear to auscultation bilaterally. Heart:  Regular rate and rhythm, S1, S2 normal, no murmur, click, rub or gallop. Abdomen:   Soft, non-tender. Bowel sounds normal. No masses,  No organomegaly. No lymphadenopathy in all 4 extremities  Alignmentshe has near normal alignment of her left lower extremity  Range of motionshe does have some limited range of motion of her left ankle she can only dorsiflex to about neutral she has about 30 degrees or so plantarflexion  Vasculardistal pulses palpable in left lower extremity  Sensory/motordeep tendon reflexes normal left lower extremity. Motor and sensory function intact. Stabilityno evidence of any instability left ankle  Tenderness to palpation throughout the left ankle area  Skinher surgical incision is healed except for the area in the very central portion of her lateral incision which shows purulent drainage coming from this  Gait-she has obvious pain with any sort of weightbearing    Assessment:     Hospital Problems  Date Reviewed: 5/18/2021    None        Number 1-6 weeks out from open reduction internal fixation of left bimalleolar ankle fracture #2postoperative wound infection left ankle    Xrays and or studies:    3 views of the left ankle shows a left lateral malleolus fracture is well aligned and the hardware is intact.   She has very limited evidence of healing at her primary fracture lines on her lateral malleolus and  fibular shaft. Her medial side shows no evidence of any problems it does appear that her medial side is healing and her hardware appears to be intact impressionwell aligned left bimalleolar ankle fracture  Plan:     Obviously I am very concerned about the fact she has frankly purulent drainage from her left lateral malleolus. I have spoke with her regarding the fact that I think the most reasonable thing would be to take her back to the operating room tomorrow for we can go through her previous incision laterally and explore this. One of the most important things we will do will be to take surgical cultures so that we can tell for sure if this is infected and what organism is responsible and obviously what antibiotic is best for this. I think I would likely remove her hardware laterally and her tight rope syndesmosis fixation. I am hopeful that she is healed enough where we can do this and not lose any of her reduction.     Signed By: Luzma Hernandez MD     Bettina 3, 2021

## 2021-06-03 NOTE — ED PROVIDER NOTES
Patient is a 26-year-old female who is about 6 weeks out from bimalleolar ankle fracture repair by Dr. Clarisa Melo. She has had some erythema and skin irritation on the lateral left ankle at the surgical site for the past few weeks. She states it then became a blister tonight the area opened and drained some pus and serous fluid. Patient spoke with [facility] on-call earlier tonight and was directed here to the ER. The history is provided by the patient. Drainage from Incision   This is a new problem. The current episode started 3 to 5 hours ago. There has been no fever. The rash is present on the left ankle. She has tried nothing for the symptoms. Past Medical History:   Diagnosis Date    Asthma     Eczema     Esophagitis, eosinophilic     Gastrointestinal disorder     esophageal disorder    Other ill-defined conditions(209.26)     eczema    León-Parkinson-White syndrome        Past Surgical History:   Procedure Laterality Date    HX APPENDECTOMY  2016    HX CHOLECYSTECTOMY  2016         History reviewed. No pertinent family history. Social History     Socioeconomic History    Marital status: SINGLE     Spouse name: Not on file    Number of children: Not on file    Years of education: Not on file    Highest education level: Not on file   Occupational History    Not on file   Tobacco Use    Smoking status: Never Smoker    Smokeless tobacco: Never Used   Vaping Use    Vaping Use: Never used   Substance and Sexual Activity    Alcohol use: No    Drug use: Yes     Types: Marijuana    Sexual activity: Never   Other Topics Concern    Not on file   Social History Narrative    Not on file     Social Determinants of Health     Financial Resource Strain:     Difficulty of Paying Living Expenses:    Food Insecurity:     Worried About Running Out of Food in the Last Year:     [address] in the Last Year:    Transportation Needs:     Lack of Transportation (Medical):      Lack of Transportation (Non-Medical):    Physical Activity:     Days of Exercise per Week:     Minutes of Exercise per Session:    Stress:     Feeling of Stress :    Social Connections:     Frequency of Communication with Friends and Family:     Frequency of Social Gatherings with Friends and Family:     Attends Holiness Services:     Active Member of Clubs or Organizations:     Attends Club or Organization Meetings:     Marital Status:    Intimate Partner Violence:     Fear of Current or Ex-Partner:     Emotionally Abused:     Physically Abused:     Sexually Abused: ALLERGIES: Latex and Pork derived (porcine)    Review of Systems   Constitutional: Negative for chills, fatigue and fever. HENT: Negative for congestion, rhinorrhea and sore throat. Eyes: Negative for pain, discharge and visual disturbance. Respiratory: Negative for cough and shortness of breath. Cardiovascular: Negative for chest pain and palpitations. Gastrointestinal: Negative for abdominal pain, diarrhea and nausea. Endocrine: Negative for polydipsia and polyuria. Genitourinary: Negative for dysuria, frequency and urgency. Musculoskeletal: Negative for back pain and neck pain. Skin: Positive for wound. Negative for rash. Neurological: Negative for seizures, syncope and weakness. Hematological: Negative. Vitals:    06/02/21 2112   BP: 118/80   Pulse: 85   Resp: 18   Temp: 99 °F (37.2 °C)   SpO2: 100%   Weight: 54.4 kg (120 lb)   Height: 5' 2\" (1.575 m)            Physical Exam  Vitals and nursing note reviewed. Constitutional:       Appearance: Normal appearance. Cardiovascular:      Pulses: Normal pulses. Musculoskeletal:         General: No swelling. Right lower leg: No edema. Left lower leg: No edema. Skin:     Capillary Refill: Capillary refill takes less than 2 seconds. Findings: Erythema present.       Comments: Healing surgical incision on the lateral left ankle in the middle of the incision there is a small area of dehiscence about 3 mm in size there is a small amount of pus and some serous fluid draining out. Some minimal erythema around the wound. No calf tenderness or swelling. Neurological:      General: No focal deficit present. Mental Status: She is alert. Sensory: No sensory deficit. Motor: No weakness. MDM  Number of Diagnoses or Management Options  Diagnosis management comments: I wore appropriate PPE throughout this patient's ED visit. Rob Larry MD, 9:48 PM    Patient is afebrile with normal vital signs she is well and nontoxic appearing I sent an image to the on-call orthopedic physician assistant for review we will also try to reach her surgeon. 10:13 PM  Nonadherent dressing, eat ABD pad and a James wrap was placed around her surgical wound. She is to follow-up with her orthopedic surgeon tomorrow morning as scheduled. Voice dictation software was used during the making of this note. This software is not perfect and grammatical and other typographical errors may be present. This note has been proofread, but may still contain errors.   Rob Larry MD; 6/2/2021 @10:13 PM   ===================================================================         Amount and/or Complexity of Data Reviewed  Review and summarize past medical records: yes  Discuss the patient with other providers: yes    Risk of Complications, Morbidity, and/or Mortality  Presenting problems: low  Diagnostic procedures: minimal  Management options: minimal    Patient Progress  Patient progress: stable         Procedures

## 2021-06-03 NOTE — ED NOTES
I have reviewed discharge instructions with the patient. The patient verbalized understanding. Patient left ED via Discharge Method: ambulatory to Home with family. Opportunity for questions and clarification provided. Patient given 0 scripts. To continue your aftercare when you leave the hospital, you may receive an automated call from our care team to check in on how you are doing. This is a free service and part of our promise to provide the best care and service to meet your aftercare needs.  If you have questions, or wish to unsubscribe from this service please call [phone]. Thank you for Choosing our [facility] Emergency Department.

## 2021-06-03 NOTE — ED TRIAGE NOTES
Patient presents with c/o abcess around incision site of left ankle that has been draining. Patient had surgery with Dr. Jaky Coley on April 19 and has an appointment with him tomorrow morning. States the pain just getting worse.  Patient masked on arrival.

## 2021-06-04 ENCOUNTER — HOSPITAL ENCOUNTER (INPATIENT)
Age: 21
LOS: 4 days | Discharge: HOME | DRG: 493 | End: 2021-06-08
Attending: ORTHOPAEDIC SURGERY | Admitting: ORTHOPAEDIC SURGERY
Payer: COMMERCIAL

## 2021-06-04 ENCOUNTER — APPOINTMENT (OUTPATIENT)
Dept: GENERAL RADIOLOGY | Age: 21
DRG: 493 | End: 2021-06-04
Attending: ORTHOPAEDIC SURGERY
Payer: COMMERCIAL

## 2021-06-04 ENCOUNTER — HOSPITAL ENCOUNTER (INPATIENT)
Dept: GENERAL RADIOLOGY | Age: 21
Setting detail: SURGERY ADMIT
Discharge: HOME | DRG: 493 | End: 2021-06-04
Attending: ORTHOPAEDIC SURGERY
Payer: COMMERCIAL

## 2021-06-04 ENCOUNTER — ANESTHESIA (OUTPATIENT)
Dept: SURGERY | Age: 21
DRG: 493 | End: 2021-06-04
Payer: COMMERCIAL

## 2021-06-04 DIAGNOSIS — T84.7XXA HARDWARE COMPLICATING WOUND INFECTION, INITIAL ENCOUNTER (HCC): Primary | ICD-10-CM

## 2021-06-04 PROBLEM — I95.9 HYPOTENSION: Status: ACTIVE | Noted: 2021-06-04

## 2021-06-04 LAB
ABO + RH BLD: NORMAL
BASOPHILS # BLD: 0 K/UL (ref 0–0.2)
BASOPHILS NFR BLD: 1 % (ref 0–2)
BLOOD GROUP ANTIBODIES SERPL: NORMAL
CREAT SERPL-MCNC: 0.58 MG/DL (ref 0.6–1)
CRP SERPL HS-MCNC: 12.1 MG/L
DIFFERENTIAL METHOD BLD: NORMAL
EOSINOPHIL # BLD: 0.3 K/UL (ref 0–0.8)
EOSINOPHIL NFR BLD: 4 % (ref 0.5–7.8)
ERYTHROCYTE [DISTWIDTH] IN BLOOD BY AUTOMATED COUNT: 13.6 % (ref 11.9–14.6)
ERYTHROCYTE [SEDIMENTATION RATE] IN BLOOD: 24 MM/HR (ref 0–20)
HCT VFR BLD AUTO: 36.7 % (ref 35.8–46.3)
HGB BLD-MCNC: 12.1 G/DL (ref 11.7–15.4)
IMM GRANULOCYTES # BLD AUTO: 0 K/UL (ref 0–0.5)
IMM GRANULOCYTES NFR BLD AUTO: 0 % (ref 0–5)
LYMPHOCYTES # BLD: 2.7 K/UL (ref 0.5–4.6)
LYMPHOCYTES NFR BLD: 36 % (ref 13–44)
MCH RBC QN AUTO: 29.9 PG (ref 26.1–32.9)
MCHC RBC AUTO-ENTMCNC: 33 G/DL (ref 31.4–35)
MCV RBC AUTO: 90.6 FL (ref 79.6–97.8)
MONOCYTES # BLD: 0.5 K/UL (ref 0.1–1.3)
MONOCYTES NFR BLD: 7 % (ref 4–12)
NEUTS SEG # BLD: 3.8 K/UL (ref 1.7–8.2)
NEUTS SEG NFR BLD: 52 % (ref 43–78)
NRBC # BLD: 0 K/UL (ref 0–0.2)
PLATELET # BLD AUTO: 250 K/UL (ref 150–450)
PMV BLD AUTO: 10.8 FL (ref 9.4–12.3)
RBC # BLD AUTO: 4.05 M/UL (ref 4.05–5.2)
SPECIMEN EXP DATE BLD: NORMAL
WBC # BLD AUTO: 7.3 K/UL (ref 4.3–11.1)

## 2021-06-04 PROCEDURE — 76010000161 HC OR TIME 1 TO 1.5 HR INTENSV-TIER 1: Performed by: ORTHOPAEDIC SURGERY

## 2021-06-04 PROCEDURE — 2709999900 HC NON-CHARGEABLE SUPPLY: Performed by: ORTHOPAEDIC SURGERY

## 2021-06-04 PROCEDURE — 87075 CULTR BACTERIA EXCEPT BLOOD: CPT

## 2021-06-04 PROCEDURE — 86901 BLOOD TYPING SEROLOGIC RH(D): CPT

## 2021-06-04 PROCEDURE — 2709999900 HC NON-CHARGEABLE SUPPLY

## 2021-06-04 PROCEDURE — 85025 COMPLETE CBC W/AUTO DIFF WBC: CPT

## 2021-06-04 PROCEDURE — 77030010509 HC AIRWY LMA MSK TELE -A: Performed by: ANESTHESIOLOGY

## 2021-06-04 PROCEDURE — 10180 I&D COMPLEX PO WOUND INFCTJ: CPT | Performed by: ORTHOPAEDIC SURGERY

## 2021-06-04 PROCEDURE — 74011250636 HC RX REV CODE- 250/636: Performed by: HOSPITALIST

## 2021-06-04 PROCEDURE — 77030017016 HC DSG ANTIMIC BARR2 S&N -B: Performed by: ORTHOPAEDIC SURGERY

## 2021-06-04 PROCEDURE — 65270000029 HC RM PRIVATE

## 2021-06-04 PROCEDURE — 74011250636 HC RX REV CODE- 250/636: Performed by: PHYSICIAN ASSISTANT

## 2021-06-04 PROCEDURE — 76210000017 HC OR PH I REC 1.5 TO 2 HR: Performed by: ORTHOPAEDIC SURGERY

## 2021-06-04 PROCEDURE — 85652 RBC SED RATE AUTOMATED: CPT

## 2021-06-04 PROCEDURE — 20680 REMOVAL OF IMPLANT DEEP: CPT | Performed by: ORTHOPAEDIC SURGERY

## 2021-06-04 PROCEDURE — 86141 C-REACTIVE PROTEIN HS: CPT

## 2021-06-04 PROCEDURE — 74011250636 HC RX REV CODE- 250/636

## 2021-06-04 PROCEDURE — 74011000250 HC RX REV CODE- 250: Performed by: ANESTHESIOLOGY

## 2021-06-04 PROCEDURE — 87186 SC STD MICRODIL/AGAR DIL: CPT

## 2021-06-04 PROCEDURE — 74011250636 HC RX REV CODE- 250/636: Performed by: NURSE ANESTHETIST, CERTIFIED REGISTERED

## 2021-06-04 PROCEDURE — 82565 ASSAY OF CREATININE: CPT

## 2021-06-04 PROCEDURE — 74011250636 HC RX REV CODE- 250/636: Performed by: ANESTHESIOLOGY

## 2021-06-04 PROCEDURE — 87077 CULTURE AEROBIC IDENTIFY: CPT

## 2021-06-04 PROCEDURE — 0QPH04Z REMOVAL OF INTERNAL FIXATION DEVICE FROM LEFT TIBIA, OPEN APPROACH: ICD-10-PCS | Performed by: ORTHOPAEDIC SURGERY

## 2021-06-04 PROCEDURE — 74011250636 HC RX REV CODE- 250/636: Performed by: ORTHOPAEDIC SURGERY

## 2021-06-04 PROCEDURE — 74011000250 HC RX REV CODE- 250: Performed by: PHYSICIAN ASSISTANT

## 2021-06-04 PROCEDURE — 77030038269 HC DRN EXT URIN PURWCK BARD -A

## 2021-06-04 PROCEDURE — 77030008462 HC STPLR SKN PROX J&J -A: Performed by: ORTHOPAEDIC SURGERY

## 2021-06-04 PROCEDURE — 36415 COLL VENOUS BLD VENIPUNCTURE: CPT

## 2021-06-04 PROCEDURE — 74011250637 HC RX REV CODE- 250/637: Performed by: ANESTHESIOLOGY

## 2021-06-04 PROCEDURE — 87176 TISSUE HOMOGENIZATION CULTR: CPT

## 2021-06-04 PROCEDURE — 77030000032 HC CUF TRNQT ZIMM -B: Performed by: ORTHOPAEDIC SURGERY

## 2021-06-04 PROCEDURE — 87070 CULTURE OTHR SPECIMN AEROBIC: CPT

## 2021-06-04 PROCEDURE — 74011250637 HC RX REV CODE- 250/637: Performed by: ORTHOPAEDIC SURGERY

## 2021-06-04 PROCEDURE — 74011000250 HC RX REV CODE- 250: Performed by: NURSE ANESTHETIST, CERTIFIED REGISTERED

## 2021-06-04 PROCEDURE — 73610 X-RAY EXAM OF ANKLE: CPT

## 2021-06-04 PROCEDURE — 0QBK0ZZ EXCISION OF LEFT FIBULA, OPEN APPROACH: ICD-10-PCS | Performed by: ORTHOPAEDIC SURGERY

## 2021-06-04 PROCEDURE — 76060000033 HC ANESTHESIA 1 TO 1.5 HR: Performed by: ORTHOPAEDIC SURGERY

## 2021-06-04 RX ORDER — MIDAZOLAM HYDROCHLORIDE 1 MG/ML
2 INJECTION, SOLUTION INTRAMUSCULAR; INTRAVENOUS ONCE
Status: DISCONTINUED | OUTPATIENT
Start: 2021-06-04 | End: 2021-06-04 | Stop reason: HOSPADM

## 2021-06-04 RX ORDER — SCOLOPAMINE TRANSDERMAL SYSTEM 1 MG/1
1 PATCH, EXTENDED RELEASE TRANSDERMAL
Status: COMPLETED | OUTPATIENT
Start: 2021-06-04 | End: 2021-06-07

## 2021-06-04 RX ORDER — LIDOCAINE HYDROCHLORIDE 20 MG/ML
INJECTION, SOLUTION EPIDURAL; INFILTRATION; INTRACAUDAL; PERINEURAL AS NEEDED
Status: DISCONTINUED | OUTPATIENT
Start: 2021-06-04 | End: 2021-06-04 | Stop reason: HOSPADM

## 2021-06-04 RX ORDER — ENOXAPARIN SODIUM 100 MG/ML
40 INJECTION SUBCUTANEOUS DAILY
Status: DISCONTINUED | OUTPATIENT
Start: 2021-06-04 | End: 2021-06-04 | Stop reason: SDUPTHER

## 2021-06-04 RX ORDER — ONDANSETRON 2 MG/ML
4 INJECTION INTRAMUSCULAR; INTRAVENOUS
Status: DISCONTINUED | OUTPATIENT
Start: 2021-06-04 | End: 2021-06-08 | Stop reason: HOSPADM

## 2021-06-04 RX ORDER — FENTANYL CITRATE 50 UG/ML
100 INJECTION, SOLUTION INTRAMUSCULAR; INTRAVENOUS ONCE
Status: DISCONTINUED | OUTPATIENT
Start: 2021-06-04 | End: 2021-06-04 | Stop reason: HOSPADM

## 2021-06-04 RX ORDER — SUMATRIPTAN 50 MG/1
100 TABLET, FILM COATED ORAL AS NEEDED
Status: DISCONTINUED | OUTPATIENT
Start: 2021-06-04 | End: 2021-06-08 | Stop reason: HOSPADM

## 2021-06-04 RX ORDER — PROPOFOL 10 MG/ML
INJECTION, EMULSION INTRAVENOUS
Status: COMPLETED
Start: 2021-06-04 | End: 2021-06-04

## 2021-06-04 RX ORDER — ENOXAPARIN SODIUM 100 MG/ML
40 INJECTION SUBCUTANEOUS EVERY 24 HOURS
Status: DISCONTINUED | OUTPATIENT
Start: 2021-06-04 | End: 2021-06-08 | Stop reason: HOSPADM

## 2021-06-04 RX ORDER — ONDANSETRON 4 MG/1
4 TABLET, ORALLY DISINTEGRATING ORAL
Status: DISCONTINUED | OUTPATIENT
Start: 2021-06-04 | End: 2021-06-04 | Stop reason: SDUPTHER

## 2021-06-04 RX ORDER — SODIUM CHLORIDE 0.9 % (FLUSH) 0.9 %
5-40 SYRINGE (ML) INJECTION EVERY 8 HOURS
Status: DISCONTINUED | OUTPATIENT
Start: 2021-06-04 | End: 2021-06-08 | Stop reason: HOSPADM

## 2021-06-04 RX ORDER — ONDANSETRON 2 MG/ML
INJECTION INTRAMUSCULAR; INTRAVENOUS AS NEEDED
Status: DISCONTINUED | OUTPATIENT
Start: 2021-06-04 | End: 2021-06-04 | Stop reason: HOSPADM

## 2021-06-04 RX ORDER — ACETAMINOPHEN 325 MG/1
650 TABLET ORAL
Status: DISCONTINUED | OUTPATIENT
Start: 2021-06-04 | End: 2021-06-04 | Stop reason: SDUPTHER

## 2021-06-04 RX ORDER — FENTANYL CITRATE 50 UG/ML
INJECTION, SOLUTION INTRAMUSCULAR; INTRAVENOUS AS NEEDED
Status: DISCONTINUED | OUTPATIENT
Start: 2021-06-04 | End: 2021-06-04 | Stop reason: HOSPADM

## 2021-06-04 RX ORDER — POLYETHYLENE GLYCOL 3350 17 G/17G
17 POWDER, FOR SOLUTION ORAL DAILY PRN
Status: DISCONTINUED | OUTPATIENT
Start: 2021-06-04 | End: 2021-06-04 | Stop reason: SDUPTHER

## 2021-06-04 RX ORDER — ROPIVACAINE HYDROCHLORIDE 5 MG/ML
INJECTION, SOLUTION EPIDURAL; INFILTRATION; PERINEURAL
Status: DISCONTINUED | OUTPATIENT
Start: 2021-06-04 | End: 2021-06-04 | Stop reason: HOSPADM

## 2021-06-04 RX ORDER — DIPHENHYDRAMINE HYDROCHLORIDE 50 MG/ML
12.5 INJECTION, SOLUTION INTRAMUSCULAR; INTRAVENOUS ONCE
Status: DISCONTINUED | OUTPATIENT
Start: 2021-06-04 | End: 2021-06-04 | Stop reason: HOSPADM

## 2021-06-04 RX ORDER — LIDOCAINE HYDROCHLORIDE 10 MG/ML
0.1 INJECTION INFILTRATION; PERINEURAL AS NEEDED
Status: DISCONTINUED | OUTPATIENT
Start: 2021-06-04 | End: 2021-06-04 | Stop reason: HOSPADM

## 2021-06-04 RX ORDER — MIDAZOLAM HYDROCHLORIDE 1 MG/ML
2 INJECTION, SOLUTION INTRAMUSCULAR; INTRAVENOUS
Status: DISCONTINUED | OUTPATIENT
Start: 2021-06-04 | End: 2021-06-04 | Stop reason: HOSPADM

## 2021-06-04 RX ORDER — ONDANSETRON 2 MG/ML
4 INJECTION INTRAMUSCULAR; INTRAVENOUS
Status: DISCONTINUED | OUTPATIENT
Start: 2021-06-04 | End: 2021-06-04 | Stop reason: SDUPTHER

## 2021-06-04 RX ORDER — ACETAMINOPHEN 650 MG/1
650 SUPPOSITORY RECTAL
Status: DISCONTINUED | OUTPATIENT
Start: 2021-06-04 | End: 2021-06-08 | Stop reason: HOSPADM

## 2021-06-04 RX ORDER — SODIUM CHLORIDE 0.9 % (FLUSH) 0.9 %
5-40 SYRINGE (ML) INJECTION AS NEEDED
Status: DISCONTINUED | OUTPATIENT
Start: 2021-06-04 | End: 2021-06-08 | Stop reason: HOSPADM

## 2021-06-04 RX ORDER — ONDANSETRON 2 MG/ML
4 INJECTION INTRAMUSCULAR; INTRAVENOUS ONCE
Status: DISCONTINUED | OUTPATIENT
Start: 2021-06-04 | End: 2021-06-04 | Stop reason: HOSPADM

## 2021-06-04 RX ORDER — GABAPENTIN 300 MG/1
300 CAPSULE ORAL ONCE
Status: COMPLETED | OUTPATIENT
Start: 2021-06-04 | End: 2021-06-04

## 2021-06-04 RX ORDER — CEFAZOLIN SODIUM/WATER 2 G/20 ML
SYRINGE (ML) INTRAVENOUS AS NEEDED
Status: DISCONTINUED | OUTPATIENT
Start: 2021-06-04 | End: 2021-06-04 | Stop reason: HOSPADM

## 2021-06-04 RX ORDER — SODIUM CHLORIDE 0.9 % (FLUSH) 0.9 %
5-40 SYRINGE (ML) INJECTION AS NEEDED
Status: DISCONTINUED | OUTPATIENT
Start: 2021-06-04 | End: 2021-06-04 | Stop reason: SDUPTHER

## 2021-06-04 RX ORDER — HYDROMORPHONE HYDROCHLORIDE 1 MG/ML
0.5 INJECTION, SOLUTION INTRAMUSCULAR; INTRAVENOUS; SUBCUTANEOUS
Status: DISCONTINUED | OUTPATIENT
Start: 2021-06-04 | End: 2021-06-04 | Stop reason: HOSPADM

## 2021-06-04 RX ORDER — MIDAZOLAM HYDROCHLORIDE 1 MG/ML
INJECTION, SOLUTION INTRAMUSCULAR; INTRAVENOUS AS NEEDED
Status: DISCONTINUED | OUTPATIENT
Start: 2021-06-04 | End: 2021-06-04 | Stop reason: HOSPADM

## 2021-06-04 RX ORDER — NALOXONE HYDROCHLORIDE 0.4 MG/ML
0.1 INJECTION, SOLUTION INTRAMUSCULAR; INTRAVENOUS; SUBCUTANEOUS AS NEEDED
Status: DISCONTINUED | OUTPATIENT
Start: 2021-06-04 | End: 2021-06-04 | Stop reason: HOSPADM

## 2021-06-04 RX ORDER — HYDROMORPHONE HYDROCHLORIDE 1 MG/ML
0.5 INJECTION, SOLUTION INTRAMUSCULAR; INTRAVENOUS; SUBCUTANEOUS
Status: DISCONTINUED | OUTPATIENT
Start: 2021-06-04 | End: 2021-06-08 | Stop reason: HOSPADM

## 2021-06-04 RX ORDER — OXYCODONE HYDROCHLORIDE 5 MG/1
10 TABLET ORAL
Status: DISCONTINUED | OUTPATIENT
Start: 2021-06-04 | End: 2021-06-04 | Stop reason: HOSPADM

## 2021-06-04 RX ORDER — KETOROLAC TROMETHAMINE 30 MG/ML
INJECTION, SOLUTION INTRAMUSCULAR; INTRAVENOUS AS NEEDED
Status: DISCONTINUED | OUTPATIENT
Start: 2021-06-04 | End: 2021-06-04 | Stop reason: HOSPADM

## 2021-06-04 RX ORDER — ALBUTEROL SULFATE 90 UG/1
2 AEROSOL, METERED RESPIRATORY (INHALATION)
Status: DISCONTINUED | OUTPATIENT
Start: 2021-06-04 | End: 2021-06-08 | Stop reason: HOSPADM

## 2021-06-04 RX ORDER — ACETAMINOPHEN 500 MG
1000 TABLET ORAL ONCE
Status: COMPLETED | OUTPATIENT
Start: 2021-06-04 | End: 2021-06-04

## 2021-06-04 RX ORDER — SODIUM CHLORIDE, SODIUM LACTATE, POTASSIUM CHLORIDE, CALCIUM CHLORIDE 600; 310; 30; 20 MG/100ML; MG/100ML; MG/100ML; MG/100ML
75 INJECTION, SOLUTION INTRAVENOUS CONTINUOUS
Status: DISCONTINUED | OUTPATIENT
Start: 2021-06-04 | End: 2021-06-04 | Stop reason: HOSPADM

## 2021-06-04 RX ORDER — SERTRALINE HYDROCHLORIDE 100 MG/1
100 TABLET, FILM COATED ORAL DAILY
Status: DISCONTINUED | OUTPATIENT
Start: 2021-06-05 | End: 2021-06-08 | Stop reason: HOSPADM

## 2021-06-04 RX ORDER — VANCOMYCIN/0.9 % SOD CHLORIDE 1.5G/250ML
1500 PLASTIC BAG, INJECTION (ML) INTRAVENOUS ONCE
Status: COMPLETED | OUTPATIENT
Start: 2021-06-04 | End: 2021-06-04

## 2021-06-04 RX ORDER — PROPOFOL 10 MG/ML
60 INJECTION, EMULSION INTRAVENOUS
Status: COMPLETED | OUTPATIENT
Start: 2021-06-04 | End: 2021-06-04

## 2021-06-04 RX ORDER — SODIUM CHLORIDE 0.9 % (FLUSH) 0.9 %
5-40 SYRINGE (ML) INJECTION EVERY 8 HOURS
Status: DISCONTINUED | OUTPATIENT
Start: 2021-06-04 | End: 2021-06-04 | Stop reason: SDUPTHER

## 2021-06-04 RX ORDER — ACETAMINOPHEN 325 MG/1
650 TABLET ORAL
Status: DISCONTINUED | OUTPATIENT
Start: 2021-06-04 | End: 2021-06-08 | Stop reason: HOSPADM

## 2021-06-04 RX ORDER — HYDROMORPHONE HYDROCHLORIDE 2 MG/1
2 TABLET ORAL
Status: DISCONTINUED | OUTPATIENT
Start: 2021-06-04 | End: 2021-06-08 | Stop reason: HOSPADM

## 2021-06-04 RX ORDER — OXYCODONE HYDROCHLORIDE 5 MG/1
5 TABLET ORAL
Status: DISCONTINUED | OUTPATIENT
Start: 2021-06-04 | End: 2021-06-04 | Stop reason: HOSPADM

## 2021-06-04 RX ORDER — KETAMINE HYDROCHLORIDE 10 MG/ML
10 INJECTION, SOLUTION INTRAMUSCULAR; INTRAVENOUS AS NEEDED
Status: DISCONTINUED | OUTPATIENT
Start: 2021-06-04 | End: 2021-06-04 | Stop reason: HOSPADM

## 2021-06-04 RX ORDER — SODIUM CHLORIDE, SODIUM LACTATE, POTASSIUM CHLORIDE, CALCIUM CHLORIDE 600; 310; 30; 20 MG/100ML; MG/100ML; MG/100ML; MG/100ML
100 INJECTION, SOLUTION INTRAVENOUS CONTINUOUS
Status: DISCONTINUED | OUTPATIENT
Start: 2021-06-04 | End: 2021-06-04 | Stop reason: HOSPADM

## 2021-06-04 RX ORDER — PROPOFOL 10 MG/ML
INJECTION, EMULSION INTRAVENOUS AS NEEDED
Status: DISCONTINUED | OUTPATIENT
Start: 2021-06-04 | End: 2021-06-04 | Stop reason: HOSPADM

## 2021-06-04 RX ORDER — ACETAMINOPHEN 650 MG/1
650 SUPPOSITORY RECTAL
Status: DISCONTINUED | OUTPATIENT
Start: 2021-06-04 | End: 2021-06-04 | Stop reason: SDUPTHER

## 2021-06-04 RX ORDER — ONDANSETRON 4 MG/1
4 TABLET, ORALLY DISINTEGRATING ORAL
Status: DISCONTINUED | OUTPATIENT
Start: 2021-06-04 | End: 2021-06-08 | Stop reason: HOSPADM

## 2021-06-04 RX ORDER — POLYETHYLENE GLYCOL 3350 17 G/17G
17 POWDER, FOR SOLUTION ORAL DAILY PRN
Status: DISCONTINUED | OUTPATIENT
Start: 2021-06-04 | End: 2021-06-08 | Stop reason: HOSPADM

## 2021-06-04 RX ORDER — DEXAMETHASONE SODIUM PHOSPHATE 4 MG/ML
INJECTION, SOLUTION INTRA-ARTICULAR; INTRALESIONAL; INTRAMUSCULAR; INTRAVENOUS; SOFT TISSUE AS NEEDED
Status: DISCONTINUED | OUTPATIENT
Start: 2021-06-04 | End: 2021-06-04 | Stop reason: HOSPADM

## 2021-06-04 RX ADMIN — FENTANYL CITRATE 25 MCG: 50 INJECTION INTRAMUSCULAR; INTRAVENOUS at 09:03

## 2021-06-04 RX ADMIN — FENTANYL CITRATE 25 MCG: 50 INJECTION INTRAMUSCULAR; INTRAVENOUS at 09:11

## 2021-06-04 RX ADMIN — ACETAMINOPHEN 1000 MG: 500 TABLET ORAL at 06:49

## 2021-06-04 RX ADMIN — PHENYLEPHRINE HYDROCHLORIDE 100 MCG: 10 INJECTION INTRAVENOUS at 08:22

## 2021-06-04 RX ADMIN — SODIUM CHLORIDE 1000 ML: 900 INJECTION, SOLUTION INTRAVENOUS at 18:00

## 2021-06-04 RX ADMIN — ROPIVACAINE HYDROCHLORIDE 20 ML: 150 INJECTION, SOLUTION EPIDURAL; INFILTRATION; PERINEURAL at 10:05

## 2021-06-04 RX ADMIN — PROPOFOL 60 MG: 10 INJECTION, EMULSION INTRAVENOUS at 09:43

## 2021-06-04 RX ADMIN — DEXAMETHASONE SODIUM PHOSPHATE 4 MG: 4 INJECTION, SOLUTION INTRAMUSCULAR; INTRAVENOUS at 08:16

## 2021-06-04 RX ADMIN — PROMETHAZINE HYDROCHLORIDE 25 MG: 25 INJECTION INTRAMUSCULAR; INTRAVENOUS at 16:46

## 2021-06-04 RX ADMIN — ROPIVACAINE HYDROCHLORIDE 20 ML: 150 INJECTION, SOLUTION EPIDURAL; INFILTRATION; PERINEURAL at 10:06

## 2021-06-04 RX ADMIN — HYDROMORPHONE HYDROCHLORIDE 0.5 MG: 1 INJECTION, SOLUTION INTRAMUSCULAR; INTRAVENOUS; SUBCUTANEOUS at 09:24

## 2021-06-04 RX ADMIN — ONDANSETRON 4 MG: 2 INJECTION INTRAMUSCULAR; INTRAVENOUS at 09:04

## 2021-06-04 RX ADMIN — Medication 10 ML: at 21:44

## 2021-06-04 RX ADMIN — KETAMINE HYDROCHLORIDE 10 MG: 10 INJECTION, SOLUTION INTRAMUSCULAR; INTRAVENOUS at 09:32

## 2021-06-04 RX ADMIN — ENOXAPARIN SODIUM 40 MG: 40 INJECTION SUBCUTANEOUS at 21:45

## 2021-06-04 RX ADMIN — LIDOCAINE HYDROCHLORIDE 100 MG: 20 INJECTION, SOLUTION EPIDURAL; INFILTRATION; INTRACAUDAL; PERINEURAL at 08:14

## 2021-06-04 RX ADMIN — VANCOMYCIN HYDROCHLORIDE 750 MG: 750 INJECTION, POWDER, LYOPHILIZED, FOR SOLUTION INTRAVENOUS at 21:44

## 2021-06-04 RX ADMIN — Medication 10 ML: at 16:47

## 2021-06-04 RX ADMIN — FENTANYL CITRATE 25 MCG: 50 INJECTION INTRAMUSCULAR; INTRAVENOUS at 08:25

## 2021-06-04 RX ADMIN — GABAPENTIN 300 MG: 300 CAPSULE ORAL at 06:49

## 2021-06-04 RX ADMIN — SODIUM CHLORIDE, SODIUM LACTATE, POTASSIUM CHLORIDE, AND CALCIUM CHLORIDE: 600; 310; 30; 20 INJECTION, SOLUTION INTRAVENOUS at 09:10

## 2021-06-04 RX ADMIN — PROPOFOL 200 MG: 10 INJECTION, EMULSION INTRAVENOUS at 08:14

## 2021-06-04 RX ADMIN — CEFAZOLIN 2 G: 1 INJECTION, POWDER, FOR SOLUTION INTRAVENOUS at 08:36

## 2021-06-04 RX ADMIN — HYDROMORPHONE HYDROCHLORIDE 0.5 MG: 1 INJECTION, SOLUTION INTRAMUSCULAR; INTRAVENOUS; SUBCUTANEOUS at 09:29

## 2021-06-04 RX ADMIN — VANCOMYCIN HYDROCHLORIDE 1500 MG: 10 INJECTION, POWDER, LYOPHILIZED, FOR SOLUTION INTRAVENOUS at 12:55

## 2021-06-04 RX ADMIN — MIDAZOLAM 2 MG: 1 INJECTION INTRAMUSCULAR; INTRAVENOUS at 08:09

## 2021-06-04 RX ADMIN — HYDROMORPHONE HYDROCHLORIDE 0.5 MG: 1 INJECTION, SOLUTION INTRAMUSCULAR; INTRAVENOUS; SUBCUTANEOUS at 09:34

## 2021-06-04 RX ADMIN — HYDROMORPHONE HYDROCHLORIDE 0.5 MG: 1 INJECTION, SOLUTION INTRAMUSCULAR; INTRAVENOUS; SUBCUTANEOUS at 09:18

## 2021-06-04 RX ADMIN — FENTANYL CITRATE 25 MCG: 50 INJECTION INTRAMUSCULAR; INTRAVENOUS at 08:32

## 2021-06-04 RX ADMIN — KETOROLAC TROMETHAMINE 30 MG: 30 INJECTION, SOLUTION INTRAMUSCULAR at 09:07

## 2021-06-04 RX ADMIN — ONDANSETRON 4 MG: 2 INJECTION INTRAMUSCULAR; INTRAVENOUS at 12:55

## 2021-06-04 RX ADMIN — Medication 3 AMPULE: at 06:49

## 2021-06-04 RX ADMIN — SODIUM CHLORIDE, SODIUM LACTATE, POTASSIUM CHLORIDE, AND CALCIUM CHLORIDE 100 ML/HR: 600; 310; 30; 20 INJECTION, SOLUTION INTRAVENOUS at 06:42

## 2021-06-04 NOTE — ANESTHESIA PROCEDURE NOTES
Peripheral Block    Start time: 6/4/2021 9:56 AM  End time: 6/4/2021 10:00 AM  Performed by: Gwen Palma MD  Authorized by: Gwen Palma MD       Pre-procedure:    Indications: at surgeon's request, post-op pain management and procedure for pain    Preanesthetic Checklist: patient identified, risks and benefits discussed, site marked, timeout performed, anesthesia consent given and patient being monitored    Timeout Time: 09:56 EDT          Block Type:   Block Type:  Popliteal  Laterality:  Left  Monitoring:  Standard ASA monitoring, responsive to questions, oxygen, continuous pulse ox, frequent vital sign checks and heart rate  Injection Technique:  Single shot  Procedures: ultrasound guided    Patient Position: left lateral decubitus  Prep: chlorhexidine    Location:  Mid thigh  Needle Type:  Stimuplex  Needle Gauge:  22 G  Needle Localization:  Ultrasound guidance  Medication Injected:  Ropivacaine (PF) (NAROPIN)(0.5%) 5 mg/mL injection, 20 mL  Med Admin Time: 6/4/2021 10:06 AM    Assessment:  Number of attempts:  1  Injection Assessment:  Incremental injection every 5 mL, no paresthesia, ultrasound image on chart, no intravascular symptoms, negative aspiration for blood and local visualized surrounding nerve on ultrasound  Patient tolerance:  Patient tolerated the procedure well with no immediate complications

## 2021-06-04 NOTE — ANESTHESIA PREPROCEDURE EVALUATION
Relevant Problems   No relevant active problems       Anesthetic History               Review of Systems / Medical History  Patient summary reviewed and pertinent labs reviewed    Pulmonary            Asthma (occas inhaler, last was 2 weeks.)        Neuro/Psych   Within defined limits           Cardiovascular            Dysrhythmias (wpw with svt, last episode 2 years ago.)       Exercise tolerance: >4 METS     GI/Hepatic/Renal     GERD: well controlled           Endo/Other  Within defined limits           Other Findings   Comments: Eosinophilic Esophagitis well controlled and asymptomatic without treatment in last year           Physical Exam    Airway  Mallampati: II  TM Distance: 4 - 6 cm  Neck ROM: normal range of motion   Mouth opening: Normal     Cardiovascular  Regular rate and rhythm,  S1 and S2 normal,  no murmur, click, rub, or gallop  Rhythm: regular  Rate: normal         Dental  No notable dental hx       Pulmonary  Breath sounds clear to auscultation               Abdominal         Other Findings            Anesthetic Plan    ASA: 2  Anesthesia type: general      Post-op pain plan if not by surgeon: peripheral nerve block single    Induction: Intravenous  Anesthetic plan and risks discussed with: Patient and Mother      Elected to not have PNB.  Will perform GA

## 2021-06-04 NOTE — INTERVAL H&P NOTE
Update History & Physical    The Patient's History and Physical of   6/3/2021 was reviewed with the patient and I examined the patient. There was no change. The surgical site was confirmed by the patient and me. Plan:  The risk, benefits, expected outcome, and alternative to the recommended procedure have been discussed with the patient. Patient understands and wants to proceed with removal of hardware from left ankle and debridement of left ankle wound.     Electronically signed by Flako Torrez MD on 6/4/2021 at 7:13 AM

## 2021-06-04 NOTE — OP NOTES
Operative Report    Patient: Sarkis Richardson MRN: [id number]  SSN: [id number]    YOB: 2000  Age: 21 y.o. Sex: female       Date of Surgery: 6/4/2021     History:  Sarkis Richardson is a 21 y.o. female who is about 6 weeks out from open reduction internal fixation of left bimalleolar ankle fracture. .  This was a rather comminuted lateral malleolus fracture with some comminution in the shaft and she also had fixation of her syndesmosis. She presented to the office with drainage from her left ankle wound. I talked to her about the need for a return trip to the operating room for hardware removal and debridement of this. I did explain that hopefully at 6 weeks out we would be able to remove her hardware and she would be able to maintain her reduction and we would be able to get her infection cleared. The plan would be to remove her hardware send operative cultures and then admit her and place her on some empiric antibiotic therapy while we await culture results and then hopefully over the next 48 to 72 hours we could adjust her antibiotics if needed based on her culture results and possibly a consultation with infectious disease. To explain all of this to her family as well as to the patient herself and they both seem to feel comfortable consenting with hardware removal and debridement of her left ankle wound. I talked to the patient and/or their representative and explained the exact nature the procedure. I also went through a detailed list of the material risks associated with  the procedure which included risk of bleeding, infection, injury to nearby structures, worsening the situation, as well as the risks associate with anesthesia and finally death. Also talked with him regarding the benefits and alternatives to the procedure.     Preoperative Diagnosis: Closed bimalleolar fracture of left ankle, initial encounter [H70.662V]     Postoperative Diagnosis: 1closed bimalleolar fracture of left ankle, initial encounter [Q37.724M] #2postoperative wound infection    Surgeon(s) and Role:     * Milly Abraham MD - Primary    Anesthesia: General     Procedure: Procedure(s):  LEFT ANKLE HARDWARE REMOVAL  LEFT ANKLE WOUND IRRIGATION AND EXCISIONAL DEBRIDEMENT skin subcutaneous tissue and bone    Procedure in Detail: After the successful duction of general anesthetic the left lower extremity was prepped and draped in usual sterile fashion. I then outlined the sinus tract which was essentially in the central portion of her incision and then ellipsed out this sinus tract and opened up her previous incision. As we did this we did encounter some mucoid type tissue in the subcutaneous area that we captured and sent for culture. We exposed the plate and there was no ana maría purulence but there was some mucoid type tissue around the plate so the entire plate was removed the screws first and the plate itself. We also removed the syndesmosis fixation by removing the lateral metal button and then the suture itself and then on the medial side and made a small incision and removed the medial metallic component of this as well. At that point since I did feel that there appeared to be some cloudy fluid around the medial aspect of the syndesmosis fixation I went ahead and made a small incision distally and remove the 2 medial malleolar screws as well. I then used a curette to debride the bone as I did the small amounts of tissue were excised I used a curette for all of the screw holes in the lateral malleolus as well as to just debride the bone itself. Medially I used a curette to debride the medial malleolus and the screw holes for the 2 medial malleolar screws. I then thoroughly irrigate with copious amount normal saline irrigating both the lateral as well as the medial wound. I placed some vancomycin powder in her lateral wound.   At that point I then began the process of wound closure using horizontal mattress sutures really throughout with closing first the lateral incision and then the 2 smaller medial incisions with 2-0 Prolene horizontal mattress sutures. After the wounds were closed we dropped the tourniquet to make sure there was no evidence of any excess bleeding and then placed the patient in a bulky dressing followed by a posterior and stirrup type splint. She was then awakened and taken to room in stable condition there were no apparent complications      Estimated Blood Loss: 30 cc    Tourniquet Time:   Total Tourniquet Time Documented:  Leg (Left) - 34 minutes  Total: Leg (Left) - 34 minutes        Implants: * No implants in log *            Specimens:   ID Type Source Tests Collected by Time Destination   1 : Left Ankle Wound Wound Ankle CULTURE, ANAEROBIC, CULTURE, WOUND W GRAM STAIN Clarice Mckinney MD 6/4/2021 1462 Microbiology           Drains: None                Complications: None    Counts: Sponge and needle counts were correct times two.     Signed By:  Kartik Lyle MD     June 4, 2021

## 2021-06-04 NOTE — ANESTHESIA POSTPROCEDURE EVALUATION
Procedure(s):  LEFT ANKLE HARDWARE REMOVAL  LEFT ANKLE  IRRIGATION AND DEBRIDEMENT. general    Anesthesia Post Evaluation      Multimodal analgesia: multimodal analgesia used between 6 hours prior to anesthesia start to PACU discharge  Patient location during evaluation: bedside  Patient participation: complete - patient participated  Level of consciousness: responsive to verbal stimuli  Pain management: adequate  Airway patency: patent  Anesthetic complications: no  Cardiovascular status: hemodynamically stable  Respiratory status: spontaneous ventilation  Hydration status: stable  Comments: Ms. Froylan Chaudhry woke up with a great deal of pain from procedure in PACU. I brought her mother to bedside to readdress the option of PNB and she agreed that would be appropriate. Consent was again reviewed and initialed. PNB was performed as documented in note. Ms. Froylan Chaudhry is now comfortable without complaints. She is being transferred to the floor. Final Post Anesthesia Temperature Assessment:  Normothermia (36.0-37.5 degrees C)      INITIAL Post-op Vital signs:   Vitals Value Taken Time   /80 06/04/21 1055   Temp 36.1 °C (97 °F) 06/04/21 1004   Pulse 93 06/04/21 1058   Resp 14 06/04/21 1019   SpO2 96 % 06/04/21 1058   Vitals shown include unvalidated device data.

## 2021-06-04 NOTE — CONSULTS
[id number] [facility]       Name:  Cabrera Win  Age:20 y.o. Sex:female   :  2000    MRN:  [id number]   PCP:  Daryn Boyer MD      Admit Date:  2021  6:07 AM     Reason for Admission:   Closed bimalleolar fracture of left ankle, initial encounter [S82.842A]  Wound infection complicating hardware ([address] [Q53. 7XXA]    Reason for Consult:  I was asked to consult on this patient at the kind request of Gm Collins MD for hypertension. Assessment & Plan:     Postoperative hypotension: Patient responded to fluids, 1 L of normal saline was ordered, blood pressure significantly improved with that. We will continue to monitor. Patient already on antibiotics with vancomycin for hardware infection. Antibiotics management as per orthopedic team.    Left ankle hardware infection: Management as per primary. History of Presenting Illness:     Cabrera Win is a 21 y.o. female with medical history of open reduction, internal fixation of left bimalleolar ankle fracture admitted to the orthopedic team with wound dehiscence, infection of hardware, status post left ankle hardware removal and debridement had episode of hypotension after surgery, orthopedic surgeon requested hospitalist consult for hypertension. Patient currently looks comfortable, current blood pressure is systolic around 173, with 1 L bolus patient blood pressure improved. Denies any chest pain, shortness of breath, fever, chills. Review of Systems:  A 14 point review of systems was taken and pertinent positive as per HPI.         Past Medical History:   Diagnosis Date    Asthma     Eczema     Esophagitis, eosinophilic     Gastrointestinal disorder     esophageal disorder    Other ill-defined conditions(799.89)     eczema    León-Parkinson-White syndrome        Past Surgical History:   Procedure Laterality Date    HX APPENDECTOMY  2016    HX CHOLECYSTECTOMY  2016    HX ORTHOPAEDIC Left 04/19/2021    ORIF of Bimalleolar ankle fracture       Family History : reviewed  No family history on file. Social History     Tobacco Use    Smoking status: Former Smoker    Smokeless tobacco: Never Used   Substance Use Topics    Alcohol use: No       Allergies   Allergen Reactions    Latex Rash    Pork Derived (Porcine) Other (comments)     Because of GI disorder       Immunization History   Administered Date(s) Administered    COVID-19, PFIZER, MRNA, LNP-S, PF, 30MCG/0.3ML DOSE 04/05/2021, 05/04/2021         PTA Medications:  Current Outpatient Medications   Medication Instructions    albuterol (PROVENTIL HFA, VENTOLIN HFA, PROAIR HFA) 90 mcg/actuation inhaler TAKE 2 PUFFS, 2 4 TIMES PER DAY AS NEEDED.     ALBUTEROL 90 mcg/Actuation inhaler 2 Puffs, EVERY 6 HOURS AS NEEDED    rizatriptan (MAXALT) 10 mg, Oral, ONCE PRN, May repeat in 2 hours if needed     sertraline (ZOLOFT) 100 mg, DAILY AS NEEDED       Objective:     Patient Vitals for the past 24 hrs:   Temp Pulse Resp BP SpO2   06/04/21 1757  65  102/74    06/04/21 1611    95/63    06/04/21 1604 97.9 °F (36.6 °C) 94 12 (!) 92/54 96 %   06/04/21 1134 97.5 °F (36.4 °C) 93 12 111/73 97 %   06/04/21 1109  (!) 101 12 111/72 97 %   06/04/21 1104  (!) 102 12 114/64 91 %   06/04/21 1059  84 12 119/68 97 %   06/04/21 1055  (!) 109 13 120/80 97 %   06/04/21 1050  (!) 103 12 120/80 98 %   06/04/21 1045  99 14 113/72 100 %   06/04/21 1039  91 12 118/77 100 %   06/04/21 1035  93 12 113/70 100 %   06/04/21 1029  97 13 115/69 100 %   06/04/21 1024  79 12 114/66 100 %   06/04/21 1019  80 14 112/70 100 %   06/04/21 1014  68 13 108/69 100 %   06/04/21 1009  76 13 106/68 100 %   06/04/21 1004 97 °F (36.1 °C) 83 12 106/66 100 %   06/04/21 1002  79 12 107/66 100 %   06/04/21 1000  82 13 107/64 100 %   06/04/21 0959  80 12 109/65 100 %   06/04/21 0957  84 12 113/67 98 %   06/04/21 0955  99 12 110/69 97 %   06/04/21 0953  (!) 102 14 111/67 (!) 78 %   06/04/21 0951  100 15 101/62 94 %   06/04/21 0949  93 14 101/62 97 %   06/04/21 0947  86 13 111/60 100 %   06/04/21 0945  (!) 101 (P) 29 108/71 98 %   06/04/21 0940  99 (P) 29 121/70 100 %   06/04/21 0935  100 (P) 25 126/80 98 %   06/04/21 0929  (!) 118 (P) 30  100 %   06/04/21 0928  96 (P) 25  (!) 88 %   06/04/21 0926  (!) 118 (!) (P) 32  100 %   06/04/21 0924  (!) 129 (P) 30  100 %   06/04/21 0922 97.4 °F (36.3 °C) (!) 135 (P) 20 139/81 100 %   06/04/21 0921  82  139/81 100 %   06/04/21 0918  (!) 143   94 %   06/04/21 0633 98.2 °F (36.8 °C) 82 16 109/78 99 %       Oxygen Therapy  O2 Sat (%): 96 % (06/04/21 1604)  Pulse via Oximetry: 98 beats per minute (06/04/21 1109)  O2 Device: None (Room air) (06/04/21 1109)    Body mass index is 23.63 kg/m². Physical Exam:    General:  No acute distress, speaking in full sentences  HEENT:  Pupils equal and reactive to light and accommodation, oropharynx is clear   Neck:   Supple, no lymphadenopathy, no JVD   Lungs:  Clear to auscultation bilaterally   CV:   Regular rate and rhythm with normal S1 and S2   Abdomen:  Soft, nontender, nondistended, normoactive bowel sounds   Extremities:  Left lower extremity hard cast noted  Neuro:  Nonfocal, A&O x3   Psych:  Normal mood and affect       Data Reviewed: I have reviewed all labs, meds, and studies.       Recent Results (from the past 24 hour(s))   CBC WITH AUTOMATED DIFF    Collection Time: 06/04/21  6:43 AM   Result Value Ref Range    WBC 7.3 4.3 - 11.1 K/uL    RBC 4.05 4.05 - 5.2 M/uL    HGB 12.1 11.7 - 15.4 g/dL    HCT 36.7 35.8 - 46.3 %    MCV 90.6 79.6 - 97.8 FL    MCH 29.9 26.1 - 32.9 PG    MCHC 33.0 31.4 - 35.0 g/dL    RDW 13.6 11.9 - 14.6 %    PLATELET 350 549 - 920 K/uL    MPV 10.8 9.4 - 12.3 FL    ABSOLUTE NRBC 0.00 0.0 - 0.2 K/uL    DF AUTOMATED      NEUTROPHILS 52 43 - 78 %    LYMPHOCYTES 36 13 - 44 %    MONOCYTES 7 4.0 - 12.0 %    EOSINOPHILS 4 0.5 - 7.8 %    BASOPHILS 1 0.0 - 2.0 % IMMATURE GRANULOCYTES 0 0.0 - 5.0 %    ABS. NEUTROPHILS 3.8 1.7 - 8.2 K/UL    ABS. LYMPHOCYTES 2.7 0.5 - 4.6 K/UL    ABS. MONOCYTES 0.5 0.1 - 1.3 K/UL    ABS. EOSINOPHILS 0.3 0.0 - 0.8 K/UL    ABS. BASOPHILS 0.0 0.0 - 0.2 K/UL    ABS. IMM. GRANS. 0.0 0.0 - 0.5 K/UL   CRP, HIGH SENSITIVITY    Collection Time: 06/04/21  6:43 AM   Result Value Ref Range    CRP, High sensitivity 12.1 mg/L   SED RATE, AUTOMATED    Collection Time: 06/04/21  6:43 AM   Result Value Ref Range    Sed rate, automated 24 (H) 0 - 20 mm/hr   TYPE & SCREEN    Collection Time: 06/04/21  6:43 AM   Result Value Ref Range    Crossmatch Expiration 06/07/2021,2359     ABO/Rh(D) O POSITIVE     Antibody screen NEG    CULTURE, TISSUE Buster Trevor STAIN    Collection Time: 06/04/21  8:28 AM    Specimen: Ankle    LEFT   Result Value Ref Range    Special Requests: NO SPECIAL REQUESTS      GRAM STAIN 0 TO 10 WBCS SEEN PER OIF     GRAM STAIN NO DEFINITE ORGANISM SEEN      Culture result: PENDING    CREATININE    Collection Time: 06/04/21 11:48 AM   Result Value Ref Range    Creatinine 0.58 (L) 0.6 - 1.0 MG/DL       EKG Results     None          All Micro Results     Procedure Component Value Units Date/Time    CULTURE, ANAEROBIC [[id number]] Collected: 06/04/21 0828    Order Status: Completed Specimen: Surgical Specimen Updated: 06/04/21 1208    CULTURE, Mira Meuse STAIN [[id number]]     Order Status: Sent Specimen: Wound from Surgical Specimen           Other Studies:  XR ANKLE LT MIN 3 V    Result Date: 6/4/2021  2 left tibia and fibula 2 views. HISTORY: Fractures. COMPARISON: 6/3/2021 FINDINGS: The interval the patient is undergone hardware removal from the lateral fibula and the medial malleolus. The bones remain anatomically aligned. Plaster cast is in place.      Status post hardware removal.        Medications:  Medications Administered      Medications Administered     acetaminophen (TYLENOL) tablet 1,000 mg     Admin Date  06/04/2021 Action  Given Dose  1,000 mg Route  Oral Administered By  Jessica Jaquez RN          alcohol 62% (NOZIN) nasal  3 Ampule     Admin Date  06/04/2021 Action  Given Dose  3 Ampule Route  Topical Administered By  Jessica Jaquez RN          gabapentin (NEURONTIN) capsule 300 mg     Admin Date  06/04/2021 Action  Given Dose  300 mg Route  Oral Administered By  Jessica Jaquez RN          HYDROmorphone (DILAUDID) injection 0.5 mg     Admin Date  06/04/2021 Action  Given Dose  0.5 mg Route  IntraVENous Administered By  Isabell Leon RN           Admin Date  06/04/2021 Action  Given Dose  0.5 mg Route  IntraVENous Administered By  Isabell Leon, ALBERT           Admin Date  06/04/2021 Action  Given Dose  0.5 mg Route  IntraVENous Administered By  Isabell Leon, ALBERT           Admin Date  06/04/2021 Action  Given Dose  0.5 mg Route  IntraVENous Administered By  Isabell Leon RN          ketamine (KETALAR) 10 mg/mL injection 10 mg     Admin Date  06/04/2021 Action  Given Dose  10 mg Route  IntraVENous Administered By  Isabell Leon RN          lactated Ringers infusion     Admin Date  06/04/2021 Action  New Bag Dose  100 mL/hr Rate  100 mL/hr Route  IntraVENous Administered By  Jessica Jaquez RN          ondansetron [facility]) injection 4 mg     Admin Date  06/04/2021 Action  Given Dose  4 mg Route  IntraVENous Administered By  Gayle Campos RN          promethazine (PHENERGAN) with saline injection 25 mg     Admin Date  06/04/2021 Action  Given Dose  25 mg Route  IntraVENous Administered By  Gayle Campos RN          propofoL (DIPRIVAN) 10 mg/mL injection 60 mg     Admin Date  06/04/2021 Action  Given by Provider Dose  60 mg Route  IntraVENous Administered By  Shawn Eldridge RN          scopolamine (TRANSDERM-SCOP) 1 mg over 3 days 1 Patch     Admin Date  06/04/2021 Action  Apply Patch Dose  1 Patch Route  TransDERmal Administered By  Jessica Jaquez RN          sodium chloride (NS) flush 5-40 mL     Admin Date  06/04/2021 Action  Given Dose  10 mL Route  IntraVENous Administered By  Gayle Smith, ALBERT          sodium chloride 0.9 % bolus infusion 1,000 mL     Admin Date  06/04/2021 Action  New Bag Dose  1,000 mL Rate  1,000 mL/hr Route  IntraVENous Administered By  Gayle Smith, ALBERT          vancomycin (VANCOCIN) 1500 mg in  ml infusion     Admin Date  06/04/2021 Action  New Bag Dose  1,500 mg Rate  250 mL/hr Route  IntraVENous Administered By  Mima Chirinos RN                  Problem List:     Hospital Problems as of 6/4/2021 Date Reviewed: 6/3/2021        Codes Class Noted - Resolved POA    Wound infection complicating hardware ([facility] [address] ICD-10-CM: T84. 7XXA  ICD-9-CM: 996.67  6/4/2021 - Present Unknown        Hypotension ICD-10-CM: I95.9  ICD-9-CM: 458.9  6/4/2021 - Present Unknown             Thank you Marylou Carrizales MD for allowing us to participate in the care of this interesting patient. We shall follow with you.     Signed By: Elena Wild MD   [facility] Hospitalist Service    June 4, 2021  4:22 PM

## 2021-06-04 NOTE — PERIOP NOTES
TRANSFER - OUT REPORT:    Verbal report given to Cody Garcia RN on [facility]  being transferred to [facility](unit) for routine post - op       Report consisted of patients Situation, Background, Assessment and   Recommendations(SBAR). Information from the following report(s) SBAR, Kardex, OR Summary, Procedure Summary, Intake/Output and MAR was reviewed with the receiving nurse. Lines:   Peripheral IV 06/04/21 Posterior;Right Hand (Active)   Site Assessment Clean, dry, & intact 06/04/21 1004   Phlebitis Assessment 0 06/04/21 1004   Infiltration Assessment 0 06/04/21 1004   Dressing Status Clean, dry, & intact 06/04/21 1004   Dressing Type Tape;Transparent 06/04/21 1004   Hub Color/Line Status Pink;Patent 06/04/21 1004        Opportunity for questions and clarification was provided.

## 2021-06-04 NOTE — ROUTINE PROCESS
TRANSFER - IN REPORT: 
 
Verbal report received from Daisha(name) on [facility]  being received from [facility](unit) for routine post - op Report consisted of patients Situation, Background, Assessment and  
Recommendations(SBAR). Information from the following report(s) SBAR was reviewed with the receiving nurse. Opportunity for questions and clarification was provided. Assessment completed upon patients arrival to unit and care assumed.

## 2021-06-04 NOTE — PROGRESS NOTES
Problem: Falls - Risk of  Goal: *Absence of Falls  Description: Document Lázaro Brooks Fall Risk and appropriate interventions in the flowsheet.   Outcome: Progressing Towards Goal  Note: Fall Risk Interventions:  Mobility Interventions: Patient to call before getting OOB         Medication Interventions: Patient to call before getting OOB    Elimination Interventions: Call light in reach              Problem: Patient Education: Go to Patient Education Activity  Goal: Patient/Family Education  Outcome: Progressing Towards Goal

## 2021-06-04 NOTE — PERIOP NOTES
Dr Saskia Patricia and Chris Garner, A CRNA at bedside for nerve block. Ultrasound used.    Time out: 0945  Procedure start: 1809  Procedure end: 5589

## 2021-06-04 NOTE — ANESTHESIA PROCEDURE NOTES
Peripheral Block    Start time: 6/4/2021 9:50 AM  End time: 6/4/2021 9:55 AM  Performed by: Neyda Sales MD  Authorized by: Neyda Sales MD       Pre-procedure: Indications: at surgeon's request, post-op pain management and procedure for pain    Preanesthetic Checklist: patient identified, risks and benefits discussed, site marked, timeout performed, anesthesia consent given and patient being monitored    Timeout Time: 09:50 EDT          Block Type:   Block Type:   Adductor canal  Laterality:  Left  Monitoring:  Standard ASA monitoring, responsive to questions, oxygen, continuous pulse ox, frequent vital sign checks and heart rate  Injection Technique:  Single shot  Procedures: ultrasound guided    Patient Position: supine  Prep: chlorhexidine    Location:  Mid thigh  Needle Type:  Stimuplex  Needle Gauge:  22 G  Needle Localization:  Ultrasound guidance  Medication Injected:  Ropivacaine (PF) (NAROPIN)(0.5%) 5 mg/mL injection, 20 mL  Med Admin Time: 6/4/2021 10:05 AM    Assessment:  Number of attempts:  1  Injection Assessment:  Incremental injection every 5 mL, no paresthesia, ultrasound image on chart, no intravascular symptoms, negative aspiration for blood and local visualized surrounding nerve on ultrasound  Patient tolerance:  Patient tolerated the procedure well with no immediate complications

## 2021-06-04 NOTE — PROGRESS NOTES
Pharmacokinetic Consult to Pharmacist    Zawill Colón is a 21 y.o. female being treated with vancomycin. Height: 5' 2\" (157.5 cm)  Weight: 58.6 kg (129 lb 3.2 oz)  Lab Results   Component Value Date/Time    BUN 10 04/24/2021 04:20 PM    Creatinine 0.58 (L) 06/04/2021 11:48 AM    WBC 7.3 06/04/2021 06:43 AM    Lactic acid 0.8 04/24/2021 04:20 PM      Estimated Creatinine Clearance: 101.4 mL/min (A) (by C-G formula based on SCr of 0.58 mg/dL (L)). CULTURES:  pending    Day 1 of vancomycin. Goal trough is 15-20. Vancomycin dose initiated at 750 mg q 8 hours. Will continue to follow patient and order levels when clinically indicated.     Thank you,  Jackie Mckenna, PharmD, [address]  Clinical Pharmacist  [phone]

## 2021-06-05 LAB
ANION GAP SERPL CALC-SCNC: 3 MMOL/L (ref 7–16)
BASOPHILS # BLD: 0 K/UL (ref 0–0.2)
BASOPHILS NFR BLD: 0 % (ref 0–2)
BUN SERPL-MCNC: 4 MG/DL (ref 6–23)
CALCIUM SERPL-MCNC: 7.8 MG/DL (ref 8.3–10.4)
CHLORIDE SERPL-SCNC: 114 MMOL/L (ref 98–107)
CO2 SERPL-SCNC: 28 MMOL/L (ref 21–32)
CREAT SERPL-MCNC: 0.48 MG/DL (ref 0.6–1)
DIFFERENTIAL METHOD BLD: ABNORMAL
EOSINOPHIL # BLD: 0.1 K/UL (ref 0–0.8)
EOSINOPHIL NFR BLD: 1 % (ref 0.5–7.8)
ERYTHROCYTE [DISTWIDTH] IN BLOOD BY AUTOMATED COUNT: 13.7 % (ref 11.9–14.6)
GLUCOSE SERPL-MCNC: 80 MG/DL (ref 65–100)
HCT VFR BLD AUTO: 32.1 % (ref 35.8–46.3)
HGB BLD-MCNC: 10.2 G/DL (ref 11.7–15.4)
IMM GRANULOCYTES # BLD AUTO: 0 K/UL (ref 0–0.5)
IMM GRANULOCYTES NFR BLD AUTO: 0 % (ref 0–5)
LYMPHOCYTES # BLD: 2.8 K/UL (ref 0.5–4.6)
LYMPHOCYTES NFR BLD: 40 % (ref 13–44)
MCH RBC QN AUTO: 29.5 PG (ref 26.1–32.9)
MCHC RBC AUTO-ENTMCNC: 31.8 G/DL (ref 31.4–35)
MCV RBC AUTO: 92.8 FL (ref 79.6–97.8)
MONOCYTES # BLD: 0.4 K/UL (ref 0.1–1.3)
MONOCYTES NFR BLD: 6 % (ref 4–12)
NEUTS SEG # BLD: 3.7 K/UL (ref 1.7–8.2)
NEUTS SEG NFR BLD: 52 % (ref 43–78)
NRBC # BLD: 0 K/UL (ref 0–0.2)
PLATELET # BLD AUTO: 178 K/UL (ref 150–450)
PMV BLD AUTO: 11.2 FL (ref 9.4–12.3)
POTASSIUM SERPL-SCNC: 3.4 MMOL/L (ref 3.5–5.1)
RBC # BLD AUTO: 3.46 M/UL (ref 4.05–5.2)
SODIUM SERPL-SCNC: 145 MMOL/L (ref 136–145)
VANCOMYCIN TROUGH SERPL-MCNC: 18 UG/ML (ref 5–20)
WBC # BLD AUTO: 7.1 K/UL (ref 4.3–11.1)

## 2021-06-05 PROCEDURE — 65270000029 HC RM PRIVATE

## 2021-06-05 PROCEDURE — 80202 ASSAY OF VANCOMYCIN: CPT

## 2021-06-05 PROCEDURE — 36415 COLL VENOUS BLD VENIPUNCTURE: CPT

## 2021-06-05 PROCEDURE — 2709999900 HC NON-CHARGEABLE SUPPLY

## 2021-06-05 PROCEDURE — 85025 COMPLETE CBC W/AUTO DIFF WBC: CPT

## 2021-06-05 PROCEDURE — 97116 GAIT TRAINING THERAPY: CPT

## 2021-06-05 PROCEDURE — 74011250636 HC RX REV CODE- 250/636: Performed by: ORTHOPAEDIC SURGERY

## 2021-06-05 PROCEDURE — 74011250637 HC RX REV CODE- 250/637: Performed by: HOSPITALIST

## 2021-06-05 PROCEDURE — 74011250637 HC RX REV CODE- 250/637: Performed by: ORTHOPAEDIC SURGERY

## 2021-06-05 PROCEDURE — 80048 BASIC METABOLIC PNL TOTAL CA: CPT

## 2021-06-05 PROCEDURE — 97161 PT EVAL LOW COMPLEX 20 MIN: CPT

## 2021-06-05 RX ORDER — POTASSIUM CHLORIDE 20 MEQ/1
40 TABLET, EXTENDED RELEASE ORAL
Status: COMPLETED | OUTPATIENT
Start: 2021-06-05 | End: 2021-06-05

## 2021-06-05 RX ADMIN — SERTRALINE 100 MG: 100 TABLET, FILM COATED ORAL at 09:19

## 2021-06-05 RX ADMIN — VANCOMYCIN HYDROCHLORIDE 750 MG: 750 INJECTION, POWDER, LYOPHILIZED, FOR SOLUTION INTRAVENOUS at 13:54

## 2021-06-05 RX ADMIN — Medication 10 ML: at 04:46

## 2021-06-05 RX ADMIN — POTASSIUM CHLORIDE 40 MEQ: 20 TABLET, EXTENDED RELEASE ORAL at 16:24

## 2021-06-05 RX ADMIN — HYDROMORPHONE HYDROCHLORIDE 2 MG: 2 TABLET ORAL at 04:43

## 2021-06-05 RX ADMIN — Medication 10 ML: at 16:34

## 2021-06-05 RX ADMIN — HYDROMORPHONE HYDROCHLORIDE 0.5 MG: 1 INJECTION, SOLUTION INTRAMUSCULAR; INTRAVENOUS; SUBCUTANEOUS at 13:53

## 2021-06-05 RX ADMIN — ACETAMINOPHEN 650 MG: 325 TABLET ORAL at 10:52

## 2021-06-05 RX ADMIN — VANCOMYCIN HYDROCHLORIDE 750 MG: 750 INJECTION, POWDER, LYOPHILIZED, FOR SOLUTION INTRAVENOUS at 21:21

## 2021-06-05 RX ADMIN — HYDROMORPHONE HYDROCHLORIDE 0.5 MG: 1 INJECTION, SOLUTION INTRAMUSCULAR; INTRAVENOUS; SUBCUTANEOUS at 09:19

## 2021-06-05 RX ADMIN — VANCOMYCIN HYDROCHLORIDE 750 MG: 750 INJECTION, POWDER, LYOPHILIZED, FOR SOLUTION INTRAVENOUS at 04:43

## 2021-06-05 RX ADMIN — ENOXAPARIN SODIUM 40 MG: 40 INJECTION SUBCUTANEOUS at 21:21

## 2021-06-05 RX ADMIN — Medication 10 ML: at 21:22

## 2021-06-05 RX ADMIN — HYDROMORPHONE HYDROCHLORIDE 0.5 MG: 1 INJECTION, SOLUTION INTRAMUSCULAR; INTRAVENOUS; SUBCUTANEOUS at 21:21

## 2021-06-05 NOTE — PROGRESS NOTES
CM met with patient to complete assessment and provide recommended DME. Patient alert and oriented x3. States that she lives in a two story home with her mother and siblings however, patient will be sleeping on the couch downstairs until injury is healed. Patient independent with ADL's, drives, works. Demographics, insurance and PCP confirmed. CM provided [address] for patient to use as shower chair. Appropriate pw signed and faxed to [facility] (receipt received). Per PT, no additional therapy needed. CM will continue to follow to assist with any other needs that may arise. Care Management Interventions  PCP Verified by CM:  Yes (Dr. Miller Ponce)  Transition of Care Consult (CM Consult): Discharge Planning  Discharge Durable Medical Equipment: Yes ([address] [facility] )  Physical Therapy Consult: Yes  Occupational Therapy Consult: No  Speech Therapy Consult: No  Current Support Network: [place] (Patient lives with mother and sibilings )  Confirm Follow Up Transport: Family  Discharge Location  Discharge Placement: Home

## 2021-06-05 NOTE — PROGRESS NOTES
POD 1    Culture - GPC    AF,VSS  No complaints  Dressing in place  Ambulating with PT, await final culture result

## 2021-06-05 NOTE — PROGRESS NOTES
[facility] Hospitalist Progress Note     Name:  Dexter Salamanca  Age:20 y.o. Sex:female   :  2000    MRN:  [id number]     Admit Date:  2021    Reason for Admission:  Closed bimalleolar fracture of left ankle, initial encounter [S82.842A]  Wound infection complicating hardware ([address] [M18. [address] Course/Interval history:     Is a 25-year-old female with past medical history significant for left bimalleolar ankle fracture status post open reduction and internal fixation about 6 weeks ago was admitted to orthopedic service with wound dehiscence, infection of the hardware. She underwent surgery yesterday and hardware was removed. After surgery she became hypotensive and hospitalist was consulted. She received 1 L IV fluid bolus and blood pressure improved. Subjective (21):    Patient is doing well this morning. Alert awake sitting in chair. No fever no chills. Blood pressure improved to 368 systolic. No nausea no vomiting no abdominal pain. Review of Systems: 14 point review of systems is otherwise negative with the exception of the elements mentioned above. Assessment & Plan     This is a 25-year-old female with: Infected hardware of recent left bimalleolar ankle fracture status post surgery and removal post-op day1. Primary, Orthopedics. Cultures positive for gram-positive cocci. Final results pending. Continue vancomycin. Pain management per pain scale. Postop hypotension:  Resolved with IV fluids. Hb stable at 10.2 from 12.1 yesterday. Blood pressure stable at 102/74 - 126/73. Hypokalemia:  repleted with PO Potassium. Diet:  DIET ADULT  DVT PPx: Lovenox  Code status: Full Code  Disposition/Expected LOS: Per primary orthopedics.       Objective:     Patient Vitals for the past 24 hrs:   Temp Pulse Resp BP SpO2   21 1124 98.1 °F (36.7 °C) 64 16 126/73 100 %   21 1051    109/83    21 0855  74  138/72    21 0801 98.1 °F (36.7 °C) 83 16 97/64 94 %   06/05/21 0603  74  102/67    06/05/21 0513 98.2 °F (36.8 °C) 66 16 112/75 98 %   06/05/21 0018 97.5 °F (36.4 °C) 63 16 100/67 98 %   06/04/21 1955 98.2 °F (36.8 °C) 64  110/69 98 %   06/04/21 1941    103/65    06/04/21 1757  65  102/74    06/04/21 1611    95/63    06/04/21 1604 97.9 °F (36.6 °C) 94 12 (!) 92/54 96 %     Oxygen Therapy  O2 Sat (%): 100 % (06/05/21 1124)  Pulse via Oximetry: 98 beats per minute (06/04/21 1109)  O2 Device: None (Room air) (06/04/21 1109)    Body mass index is 23.63 kg/m². Physical Exam:   General:  No acute distress, speaking in full sentences, no use of accessory muscles   Lungs:  Clear to auscultation bilaterally, no wheezing, no crackles  CV:  Regular rate and rhythm with normal S1 and S2, no murmurs rubs or gallops,  Abdomen:  Soft, nontender, nondistended, normoactive bowel sounds, no organomegaly,  Extremities:  No cyanosis clubbing or edema, left leg dressing in place,   Neuro:  Nonfocal, A&O x3   Psych:  Normal affect     Data Review:  I have reviewed all labs, meds, and studies from the last 24 hours:    Labs:    Recent Results (from the past 24 hour(s))   CBC WITH AUTOMATED DIFF    Collection Time: 06/05/21  5:48 AM   Result Value Ref Range    WBC 7.1 4.3 - 11.1 K/uL    RBC 3.46 (L) 4.05 - 5.2 M/uL    HGB 10.2 (L) 11.7 - 15.4 g/dL    HCT 32.1 (L) 35.8 - 46.3 %    MCV 92.8 79.6 - 97.8 FL    MCH 29.5 26.1 - 32.9 PG    MCHC 31.8 31.4 - 35.0 g/dL    RDW 13.7 11.9 - 14.6 %    PLATELET 753 132 - 934 K/uL    MPV 11.2 9.4 - 12.3 FL    ABSOLUTE NRBC 0.00 0.0 - 0.2 K/uL    DF AUTOMATED      NEUTROPHILS 52 43 - 78 %    LYMPHOCYTES 40 13 - 44 %    MONOCYTES 6 4.0 - 12.0 %    EOSINOPHILS 1 0.5 - 7.8 %    BASOPHILS 0 0.0 - 2.0 %    IMMATURE GRANULOCYTES 0 0.0 - 5.0 %    ABS. NEUTROPHILS 3.7 1.7 - 8.2 K/UL    ABS. LYMPHOCYTES 2.8 0.5 - 4.6 K/UL    ABS. MONOCYTES 0.4 0.1 - 1.3 K/UL    ABS. EOSINOPHILS 0.1 0.0 - 0.8 K/UL    ABS.  BASOPHILS 0.0 0.0 - 0.2 K/UL    ABS. IMM. GRANS. 0.0 0.0 - 0.5 K/UL   METABOLIC PANEL, BASIC    Collection Time: 06/05/21  9:22 AM   Result Value Ref Range    Sodium 145 136 - 145 mmol/L    Potassium 3.4 (L) 3.5 - 5.1 mmol/L    Chloride 114 (H) 98 - 107 mmol/L    CO2 28 21 - 32 mmol/L    Anion gap 3 (L) 7 - 16 mmol/L    Glucose 80 65 - 100 mg/dL    BUN 4 (L) 6 - 23 MG/DL    Creatinine 0.48 (L) 0.6 - 1.0 MG/DL    GFR est AA >60 >60 ml/min/1.73m2    GFR est non-AA >60 >60 ml/min/1.73m2    Calcium 7.8 (L) 8.3 - 10.4 MG/DL       All Micro Results     Procedure Component Value Units Date/Time    CULTURE, ANAEROBIC [[id number]] Collected: 06/04/21 0828    Order Status: Completed Specimen: Surgical Specimen Updated: 06/04/21 1208    CULTURE, Berl Banks STAIN [[id number]] Collected: 06/04/21 0830    Order Status: Canceled Specimen: Wound from Surgical Specimen           EKG Results     None          Other Studies:  No results found.     Current Meds:   Current Facility-Administered Medications   Medication Dose Route Frequency    Vancomycin Trough Reminder   Other ONCE    potassium chloride (K-DUR, KLOR-CON) SR tablet 40 mEq  40 mEq Oral NOW    scopolamine (TRANSDERM-SCOP) 1 mg over 3 days 1 Patch  1 Patch TransDERmal NOW    albuterol (PROVENTIL HFA, VENTOLIN HFA, PROAIR HFA) inhaler 2 Puff  2 Puff Inhalation Q6H PRN    SUMAtriptan (IMITREX) tablet 100 mg  100 mg Oral PRN    sertraline (ZOLOFT) tablet 100 mg  100 mg Oral DAILY    sodium chloride (NS) flush 5-40 mL  5-40 mL IntraVENous Q8H    sodium chloride (NS) flush 5-40 mL  5-40 mL IntraVENous PRN    acetaminophen (TYLENOL) tablet 650 mg  650 mg Oral Q6H PRN    Or    acetaminophen (TYLENOL) suppository 650 mg  650 mg Rectal Q6H PRN    polyethylene glycol (MIRALAX) packet 17 g  17 g Oral DAILY PRN    ondansetron (ZOFRAN ODT) tablet 4 mg  4 mg Oral Q8H PRN    Or    ondansetron (ZOFRAN) injection 4 mg  4 mg IntraVENous Q6H PRN    enoxaparin (LOVENOX) injection 40 mg  40 mg SubCUTAneous Q24H    HYDROmorphone (DILAUDID) tablet 2 mg  2 mg Oral Q4H PRN    HYDROmorphone (DILAUDID) injection 0.5 mg  0.5 mg IntraVENous Q2H PRN    vancomycin (VANCOCIN) 750 mg in 0.9% sodium chloride 250 mL (VIAL-MATE)  750 mg IntraVENous Q8H    promethazine (PHENERGAN) with saline injection 25 mg  25 mg IntraVENous Q6H PRN       Problem List:  Hospital Problems as of 6/5/2021 Date Reviewed: 6/3/2021        Codes Class Noted - Resolved POA    Wound infection complicating hardware ([facility] ICD-10-CM: T84. 7XXA  ICD-9-CM: 996.67  6/4/2021 - Present Unknown        Hypotension ICD-10-CM: I95.9  ICD-9-CM: 458.9  6/4/2021 - Present Unknown                 Part of this note was written by using a voice dictation software and the note has been proof read but may still contain some grammatical/other typographical errors.     Signed By: Dolly Almaraz MD   [facility] Hospitalist Service    June 5, 2021

## 2021-06-05 NOTE — PROGRESS NOTES
06/05/21 0855   Dual Skin Pressure Injury Assessment   Dual Skin Pressure Injury Assessment WDL   Second Care Provider (Based on [address] [place] [address]) Jo-Ann Matthew RN   Skin Integumentary   Skin Integumentary (WDL) X    Pressure  Injury Documentation No Pressure Injury Noted-Pressure Ulcer Prevention Initiated   Skin Integrity Incision (comment); Excoriation  (incision - LLE, scattered excoriation)   Wound Prevention and Protection Methods   Orientation of Wound Prevention Posterior   Location of Wound Prevention Sacrum/Coccyx   Dressing Present  No   Wound Offloading (Prevention Methods) Repositioning

## 2021-06-05 NOTE — PROGRESS NOTES
Problem: Falls - Risk of  Goal: *Absence of Falls  Description: Document [place] [place] Fall Risk and appropriate interventions in the flowsheet.   Outcome: Progressing Towards Goal  Note: Fall Risk Interventions:  Mobility Interventions: Bed/chair exit alarm, Patient to call before getting OOB         Medication Interventions: Bed/chair exit alarm    Elimination Interventions: Bed/chair exit alarm, Call light in reach              Problem: Patient Education: Go to Patient Education Activity  Goal: Patient/Family Education  Outcome: Progressing Towards Goal

## 2021-06-05 NOTE — PROGRESS NOTES
ACUTE PHYSICAL THERAPY GOALS:  (Developed with and agreed upon by patient and/or caregiver.)    1. Patient will perform bed mobility with INDEPENDENCE within 7 days. 2. Patient will transfer bed to chair with MODIFIED INDEPEDENCE within 7 days. MET 6/5/21  3. Patient will demonstrate FAIR+ DYNAMIC STANDING balance within 7 day(s). MET 6/5/21  4. Patient will ambulate 75ft+ using least restrictive assistive device and MINIMAL ASSISTANCE within 7 days. 5. Patient will negotiate 3 steps with 0 hand rails with SBA within 7 treatment days. PHYSICAL THERAPY ASSESSMENT: Initial Assessment, Daily Note and AM PT Treatment Day # 1     BRUNO WILLIAM Fragoso is a 21 y.o. female   PRIMARY DIAGNOSIS: <principal problem not specified>  Closed bimalleolar fracture of left ankle, initial encounter [S82.842A]  Wound infection complicating hardware ([address] [R23. 7XXA]  Procedure(s) (LRB):  LEFT ANKLE HARDWARE REMOVAL (Left)  LEFT ANKLE  IRRIGATION AND DEBRIDEMENT (Left)  1 Day Post-Op  Reason for Referral:    ICD-10: Treatment Diagnosis: Difficulty in walking, Not elsewhere classified (R26.2)  INPATIENT: Payor: Count includes the [facility] / Plan: [facility] OTHER / Product Type: PPO /     ASSESSMENT:     REHAB RECOMMENDATIONS:   Recommendation to date pending progress:  Setting:   No further skilled therapy   Equipment:    3 in 1 BSC as Shower Chair     PRIOR LEVEL OF FUNCTION:  (Prior to Hospitalization) INITIAL/CURRENT LEVEL OF FUNCTION:  (Most Recently Demonstrated)   Bed Mobility:   Modified Independent  Sit to Stand:   Modified Independent  Transfers:   Modified Independent  Gait/Mobility:   Modified Independent Bed Mobility:   Modified Independent  Sit to Stand:   Modified Independent  Transfers:  [facility] Assistance  Gait/Mobility:   Standby Assistance     ASSESSMENT:  Ms. Bridgett Richardson is a pleasant 21year old female with history significant closed bimalleolar fracture s/p left ankle ORIF.  Patient is now 1 day s/p hardware removal and I&D secondary to presumed infection. Patient seen this AM for initial evaluation. Reviewed bed mobility, transfer training, NWB, crutch gait training, home safety, and visual demonstration of stair negotiation. Patient mobilized with modified independence to SBA. Will follow 1 additional treatment to perform stair training. No anticipated continued therapy services. May benefit from 3-in-1 BSC to utilize as shower chair for safe bathing. SUBJECTIVE:   Ms. Froylan Chaudhry states, \"Thank you. \"    SOCIAL HISTORY/LIVING ENVIRONMENT: Lives with mother and sibilings in a 2 level residence. Three steps to enter her home; plans to sleep on the couch downstairs until can negotiate upstairs. Used crutches previously and progressed to boot. OBJECTIVE:     PAIN: VITAL SIGNS: LINES/DRAINS:   Pre Treatment: Pain Screen  Pain Scale 1: Numeric (0 - 10)  Pain Intensity 1: 2  Pain Onset 1: post op  Pain Location 1: Ankle  Pain Orientation 1: Left  Pain Description 1: Sore  Pain Intervention(s) 1: Emotional support;Position  Post Treatment: 2/10   IV  O2 Device: None (Room air)     GROSS EVALUATION:   Within Functional Limits Abnormal/ Functional Abnormal/ Non-Functional (see comments) Not Tested Comments:   AROM [] [x] [] [] Distal L LE   PROM [] [] [] [x]    Strength [x] [] [] []    Balance [x] [] [] []    Posture [x] [] [] []    Sensation [] [x] [] [] Intact light touch distal L LE; mildly diminished lateral and medial peripatellar area.     Coordination [x] [] [] []    Tone [] [] [] [x]    Edema [] [] [] [x]    Activity Tolerance [x] [] [] [] Mild dizziness; s/p IV pain medication administration; subsided with activity pacing    [] [] [] []      COGNITION/  PERCEPTION: Intact Impaired   (see comments) Comments:   Orientation [x] []    Vision [] []    Hearing [] []    Command Following [x] []    Safety Awareness [x] [] Reviewed with teach back    [] []      MOBILITY: I Mod I S SBA CGA Min Mod Max Total  NT x2 Comments:   Bed Mobility    Rolling [] [] [] [] [] [] [] [] [] [x] []    Supine to Sit [] [x] [] [] [] [] [] [] [] [] []    Scooting [] [x] [] [] [] [] [] [] [] [] []    Sit to Supine [] [] [] [] [] [] [] [] [] [x] []    Transfers    Sit to Stand [] [x] [] [] [] [] [] [] [] [] []    Bed to Chair [] [] [] [x] [] [] [] [] [] [] []    Stand to Sit [] [] [] [x] [] [] [] [] [] [] []    I=Independent, Mod I=Modified Independent, S=Supervision, SBA=Standby Assistance, CGA=Contact Guard Assistance,   Min=Minimal Assistance, Mod=Moderate Assistance, Max=Maximal Assistance, Total=Total Assistance, NT=Not Tested  GAIT: I Mod I S SBA CGA Min Mod Max Total  NT x2 Comments:   Level of Assistance [] [] [x] [] [] [] [] [] [] [] []    Distance 20ft    DME Crutches    Gait Quality Maintained of NWB L LE; step to; fair+ to good dynamica balance    Weightbearing Status NWB, L LE     I=Independent, Mod I=Modified Independent, S=Supervision, SBA=Standby Assistance, CGA=Contact Guard Assistance,   Min=Minimal Assistance, Mod=Moderate Assistance, Max=Maximal Assistance, Total=Total Assistance, NT=Not Tested    [address] [facility] [address] [id number] AM[facility]       How much difficulty does the patient currently have. .. Unable A Lot A Little None   1. Turning over in bed (including adjusting bedclothes, sheets and blankets)? [] 1   [] 2   [] 3   [x] 4   2. Sitting down on and standing up from a chair with arms ( e.g., wheelchair, bedside commode, etc.)   [] 1   [] 2   [] 3   [x] 4   3. Moving from lying on back to sitting on the side of the bed? [] 1   [] 2   [] 3   [x] 4   How much help from another person does the patient currently need. .. Total A Lot A Little None   4. Moving to and from a bed to a chair (including a wheelchair)? [] 1   [] 2   [x] 3   [] 4   5. Need to walk in hospital room? [] 1   [] 2   [x] 3   [] 4   6. Climbing 3-5 steps with a railing?    [] 1   [] 2   [x] 3   [] 4   © 2007, Trustees of [facility], under license to [employer]. All rights reserved     Score:  Initial: 20 Most Recent: X (Date: -- )    Interpretation of Tool:  Represents activities that are increasingly more difficult (i.e. Bed mobility, Transfers, Gait). PLAN:   FREQUENCY/DURATION: PT Plan of Care: Daily for duration of hospital stay or until stated goals are met, whichever comes first.    PROBLEM LIST:   (Skilled intervention is medically necessary to address:)  1. Decreased Gait Ability  2. Decreased Transfer Abilities  3. Increased Pain   INTERVENTIONS PLANNED:   (Benefits and precautions of physical therapy have been discussed with the patient.)  1. Therapeutic Activity  2. Therapeutic Exercise/HEP  3. Neuromuscular Re-education  4. Gait Training  5. Manual Therapy  6. Education     TREATMENT:     EVALUATION: Low Complexity : (Untimed Charge)    TREATMENT:   ($$ Gait Trainin-22 mins    )  Gait Training (8 Minutes): Gait training for 20 feet utilizing Crutches. Patient required Tactile, Verbal and Visual cueing to improve Activity Pacing, Assistive Device Utilization, Dynamic Standing Balance, Gait Mechanics and safety education, NWB education with teach back, visual demonstration of stair negotiation.      TREATMENT GRID:  N/A    AFTER TREATMENT POSITION/PRECAUTIONS:  Chair, Needs within reach and RN notified    INTERDISCIPLINARY COLLABORATION:  RN/PCT and PT/PTA    TOTAL TREATMENT DURATION:  PT Patient Time In/Time Out  Time In: 9510  Time Out: [facility]

## 2021-06-06 LAB
ANION GAP SERPL CALC-SCNC: 5 MMOL/L (ref 7–16)
BASOPHILS # BLD: 0 K/UL (ref 0–0.2)
BASOPHILS NFR BLD: 1 % (ref 0–2)
BUN SERPL-MCNC: 5 MG/DL (ref 6–23)
CALCIUM SERPL-MCNC: 7.5 MG/DL (ref 8.3–10.4)
CHLORIDE SERPL-SCNC: 111 MMOL/L (ref 98–107)
CO2 SERPL-SCNC: 28 MMOL/L (ref 21–32)
CREAT SERPL-MCNC: 0.45 MG/DL (ref 0.6–1)
DIFFERENTIAL METHOD BLD: ABNORMAL
EOSINOPHIL # BLD: 0.2 K/UL (ref 0–0.8)
EOSINOPHIL NFR BLD: 3 % (ref 0.5–7.8)
ERYTHROCYTE [DISTWIDTH] IN BLOOD BY AUTOMATED COUNT: 13.8 % (ref 11.9–14.6)
GLUCOSE SERPL-MCNC: 78 MG/DL (ref 65–100)
HCT VFR BLD AUTO: 31.6 % (ref 35.8–46.3)
HGB BLD-MCNC: 10.1 G/DL (ref 11.7–15.4)
IMM GRANULOCYTES # BLD AUTO: 0 K/UL (ref 0–0.5)
IMM GRANULOCYTES NFR BLD AUTO: 0 % (ref 0–5)
LYMPHOCYTES # BLD: 2.9 K/UL (ref 0.5–4.6)
LYMPHOCYTES NFR BLD: 53 % (ref 13–44)
MCH RBC QN AUTO: 29.4 PG (ref 26.1–32.9)
MCHC RBC AUTO-ENTMCNC: 32 G/DL (ref 31.4–35)
MCV RBC AUTO: 92.1 FL (ref 79.6–97.8)
MONOCYTES # BLD: 0.4 K/UL (ref 0.1–1.3)
MONOCYTES NFR BLD: 8 % (ref 4–12)
NEUTS SEG # BLD: 1.9 K/UL (ref 1.7–8.2)
NEUTS SEG NFR BLD: 35 % (ref 43–78)
NRBC # BLD: 0 K/UL (ref 0–0.2)
PLATELET # BLD AUTO: 172 K/UL (ref 150–450)
PMV BLD AUTO: 11 FL (ref 9.4–12.3)
POTASSIUM SERPL-SCNC: 3.7 MMOL/L (ref 3.5–5.1)
RBC # BLD AUTO: 3.43 M/UL (ref 4.05–5.2)
SODIUM SERPL-SCNC: 144 MMOL/L (ref 136–145)
WBC # BLD AUTO: 5.4 K/UL (ref 4.3–11.1)

## 2021-06-06 PROCEDURE — 97116 GAIT TRAINING THERAPY: CPT

## 2021-06-06 PROCEDURE — 74011250637 HC RX REV CODE- 250/637: Performed by: ORTHOPAEDIC SURGERY

## 2021-06-06 PROCEDURE — 2709999900 HC NON-CHARGEABLE SUPPLY

## 2021-06-06 PROCEDURE — 85025 COMPLETE CBC W/AUTO DIFF WBC: CPT

## 2021-06-06 PROCEDURE — 74011250636 HC RX REV CODE- 250/636: Performed by: PHYSICIAN ASSISTANT

## 2021-06-06 PROCEDURE — 74011000250 HC RX REV CODE- 250: Performed by: PHYSICIAN ASSISTANT

## 2021-06-06 PROCEDURE — 65270000029 HC RM PRIVATE

## 2021-06-06 PROCEDURE — 74011250636 HC RX REV CODE- 250/636: Performed by: ORTHOPAEDIC SURGERY

## 2021-06-06 PROCEDURE — 36415 COLL VENOUS BLD VENIPUNCTURE: CPT

## 2021-06-06 PROCEDURE — 80048 BASIC METABOLIC PNL TOTAL CA: CPT

## 2021-06-06 RX ADMIN — ACETAMINOPHEN 650 MG: 325 TABLET ORAL at 17:26

## 2021-06-06 RX ADMIN — ONDANSETRON 4 MG: 4 TABLET, ORALLY DISINTEGRATING ORAL at 12:06

## 2021-06-06 RX ADMIN — VANCOMYCIN HYDROCHLORIDE 750 MG: 750 INJECTION, POWDER, LYOPHILIZED, FOR SOLUTION INTRAVENOUS at 04:02

## 2021-06-06 RX ADMIN — SERTRALINE 100 MG: 100 TABLET, FILM COATED ORAL at 09:03

## 2021-06-06 RX ADMIN — VANCOMYCIN HYDROCHLORIDE 750 MG: 750 INJECTION, POWDER, LYOPHILIZED, FOR SOLUTION INTRAVENOUS at 12:06

## 2021-06-06 RX ADMIN — ONDANSETRON 4 MG: 2 INJECTION INTRAMUSCULAR; INTRAVENOUS at 04:09

## 2021-06-06 RX ADMIN — Medication 10 ML: at 14:51

## 2021-06-06 RX ADMIN — VANCOMYCIN HYDROCHLORIDE 750 MG: 750 INJECTION, POWDER, LYOPHILIZED, FOR SOLUTION INTRAVENOUS at 22:41

## 2021-06-06 RX ADMIN — HYDROMORPHONE HYDROCHLORIDE 2 MG: 2 TABLET ORAL at 13:58

## 2021-06-06 RX ADMIN — HYDROMORPHONE HYDROCHLORIDE 2 MG: 2 TABLET ORAL at 22:44

## 2021-06-06 RX ADMIN — HYDROMORPHONE HYDROCHLORIDE 2 MG: 2 TABLET ORAL at 18:52

## 2021-06-06 RX ADMIN — ENOXAPARIN SODIUM 40 MG: 40 INJECTION SUBCUTANEOUS at 22:41

## 2021-06-06 RX ADMIN — PROMETHAZINE HYDROCHLORIDE 25 MG: 25 INJECTION INTRAMUSCULAR; INTRAVENOUS at 00:15

## 2021-06-06 RX ADMIN — PROMETHAZINE HYDROCHLORIDE 25 MG: 25 INJECTION INTRAMUSCULAR; INTRAVENOUS at 14:51

## 2021-06-06 RX ADMIN — HYDROMORPHONE HYDROCHLORIDE 0.5 MG: 1 INJECTION, SOLUTION INTRAMUSCULAR; INTRAVENOUS; SUBCUTANEOUS at 04:00

## 2021-06-06 RX ADMIN — Medication 5 ML: at 22:44

## 2021-06-06 RX ADMIN — Medication 10 ML: at 04:00

## 2021-06-06 RX ADMIN — ONDANSETRON 4 MG: 4 TABLET, ORALLY DISINTEGRATING ORAL at 22:43

## 2021-06-06 RX ADMIN — HYDROMORPHONE HYDROCHLORIDE 2 MG: 2 TABLET ORAL at 09:11

## 2021-06-06 NOTE — PROGRESS NOTES
2021         Post Op day: 2 Days Post-Op Procedure(s) (LRB):  LEFT ANKLE HARDWARE REMOVAL (Left)  LEFT ANKLE  IRRIGATION AND DEBRIDEMENT (Left)      Admit Date: 2021  Admit Diagnosis: Closed bimalleolar fracture of left ankle, initial encounter [S82.842A]  Wound infection complicating hardware ([address] [D97. 7XXA]       Principle Problem: <principal problem not specified>. Subjective: Doing well, No complaints, No SOB, No Chest Pain, No Nausea or Vomiting     Objective:   Vital Signs are Stable, No Acute Distress, Alert,  Dressing is Dry,  Neurovascular exam is normal.     Assessment / Plan :  Patient Active Problem List   Diagnosis Code    Wound infection complicating hardware ([address] T84. 7XXA    Hypotension I95.9      Patient Vitals for the past 8 hrs:   BP Temp Pulse Resp SpO2   21 0736 115/79 98.2 °F (36.8 °C) 78 16 97 %   21 0356 120/80 98.1 °F (36.7 °C) 66 16 95 %   21 0100 106/70 97.8 °F (36.6 °C) 78 16 97 %    Temp (24hrs), Av.1 °F (36.7 °C), Min:97.8 °F (36.6 °C), Max:98.5 °F (36.9 °C)    Body mass index is 23.63 kg/m².     Lab Results   Component Value Date/Time    HGB 10.1 (L) 2021 06:04 AM          S/P Procedure(s) (LRB):  LEFT ANKLE HARDWARE REMOVAL (Left)  LEFT ANKLE  IRRIGATION AND DEBRIDEMENT (Left)    Cultures growing GPC: continue to follow until finalize and consult ID for outpatient antibiotic plan  Medical Mgmt per hospitalist  Continue PT  Fall Precautions  F/U: 2 weeks postop for wound check and staple removal        Signed By: SHAWN Ramírez  2021,  8:41 AM

## 2021-06-06 NOTE — PROGRESS NOTES
[facility] Hospitalist Progress Note     Name:  Shi Cueto  Age:20 y.o. Sex:female   :  2000    MRN:  [id number]     Admit Date:  2021    Reason for Admission:  Closed bimalleolar fracture of left ankle, initial encounter [S82.842A]  Wound infection complicating hardware ([facility] [address] [L65. [address] Course/Interval history: This is a 80-year-old female with past medical history significant for left bimalleolar ankle fracture status post open reduction and internal fixation about 6 weeks ago was admitted to orthopedic service with wound dehiscence, infection of the hardware. She underwent surgery yesterday and hardware was removed. After surgery she became hypotensive and hospitalist was consulted. She received 1 L IV fluid bolus and blood pressure improved. Subjective (21):  Pt is doing well. No nausea, no chest pain. BP is 120/80 this am. No abdominal pain. Pain in left leg improved, able to ambulate with crutches. Review of Systems: 14 point review of systems is otherwise negative with the exception of the elements mentioned above. 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 Primary, Orthopedics. Cultures positive for MRSA. Continue vancomycin. Pain management per pain scale. ID consult per Primary Orthopedics. Postop hypotension: resolved  Hb stable at 10.1. Blood pressure stable off of IV fluids. Hypokalemia: resolved    Diet:  DIET ADULT  DVT PPx: Lovenox  Code status: Full Code  Disposition/Expected LOS: Per primary orthopedics.        Objective:     Patient Vitals for the past 24 hrs:   Temp Pulse Resp BP SpO2   21 1122 98.1 °F (36.7 °C) 79 16 113/78 94 %   21 0736 98.2 °F (36.8 °C) 78 16 115/79 97 %   21 0356 98.1 °F (36.7 °C) 66 16 120/80 95 %   21 0100 97.8 °F (36.6 °C) 78 16 106/70 97 % 06/05/21 2027 98.5 °F (36.9 °C) 88 16 109/67 97 %   06/05/21 1630 98.1 °F (36.7 °C) 87 16 123/75 97 %     Oxygen Therapy  O2 Sat (%): 94 % (06/06/21 1122)  Pulse via Oximetry: 98 beats per minute (06/04/21 1109)  O2 Device: None (Room air) (06/04/21 1109)    Body mass index is 23.63 kg/m². Physical Exam:   General:  No acute distress, speaking in full sentences, no use of accessory muscles   Lungs:  Clear to auscultation bilaterally, no wheezing, no crackles  CV:  Regular rate and rhythm with normal S1 and S2, no murmurs rubs or gallops,  Abdomen:  Soft, nontender, nondistended, normoactive bowel sounds, no organomegaly,  Extremities:  No cyanosis clubbing or edema, left leg dressing in place,   Neuro:  Nonfocal, A&O x3   Psych:  Normal affect     Data Review:  I have reviewed all labs, meds, and studies from the last 24 hours:    Labs:    Recent Results (from the past 24 hour(s))   VANCOMYCIN, TROUGH    Collection Time: 06/05/21  7:36 PM   Result Value Ref Range    Vancomycin,trough 18.0 5 - 20 ug/mL   CBC WITH AUTOMATED DIFF    Collection Time: 06/06/21  6:04 AM   Result Value Ref Range    WBC 5.4 4.3 - 11.1 K/uL    RBC 3.43 (L) 4.05 - 5.2 M/uL    HGB 10.1 (L) 11.7 - 15.4 g/dL    HCT 31.6 (L) 35.8 - 46.3 %    MCV 92.1 79.6 - 97.8 FL    MCH 29.4 26.1 - 32.9 PG    MCHC 32.0 31.4 - 35.0 g/dL    RDW 13.8 11.9 - 14.6 %    PLATELET 361 050 - 275 K/uL    MPV 11.0 9.4 - 12.3 FL    ABSOLUTE NRBC 0.00 0.0 - 0.2 K/uL    DF AUTOMATED      NEUTROPHILS 35 (L) 43 - 78 %    LYMPHOCYTES 53 (H) 13 - 44 %    MONOCYTES 8 4.0 - 12.0 %    EOSINOPHILS 3 0.5 - 7.8 %    BASOPHILS 1 0.0 - 2.0 %    IMMATURE GRANULOCYTES 0 0.0 - 5.0 %    ABS. NEUTROPHILS 1.9 1.7 - 8.2 K/UL    ABS. LYMPHOCYTES 2.9 0.5 - 4.6 K/UL    ABS. MONOCYTES 0.4 0.1 - 1.3 K/UL    ABS. EOSINOPHILS 0.2 0.0 - 0.8 K/UL    ABS. BASOPHILS 0.0 0.0 - 0.2 K/UL    ABS. IMM.  GRANS. 0.0 0.0 - 0.5 K/UL   METABOLIC PANEL, BASIC    Collection Time: 06/06/21  6:04 AM   Result Value Ref Range    Sodium 144 136 - 145 mmol/L    Potassium 3.7 3.5 - 5.1 mmol/L    Chloride 111 (H) 98 - 107 mmol/L    CO2 28 21 - 32 mmol/L    Anion gap 5 (L) 7 - 16 mmol/L    Glucose 78 65 - 100 mg/dL    BUN 5 (L) 6 - 23 MG/DL    Creatinine 0.45 (L) 0.6 - 1.0 MG/DL    GFR est AA >60 >60 ml/min/1.73m2    GFR est non-AA >60 >60 ml/min/1.73m2    Calcium 7.5 (L) 8.3 - 10.4 MG/DL       All Micro Results     Procedure Component Value Units Date/Time    CULTURE, ANAEROBIC [[id number]] Collected: 06/04/21 0828    Order Status: Completed Specimen: Surgical Specimen Updated: 06/04/21 1208    CULTURE, Kelsi Warren STAIN [[id number]] Collected: 06/04/21 0830    Order Status: Canceled Specimen: Wound from Surgical Specimen           EKG Results     None          Other Studies:  No results found.     Current Meds:   Current Facility-Administered Medications   Medication Dose Route Frequency    scopolamine (TRANSDERM-SCOP) 1 mg over 3 days 1 Patch  1 Patch TransDERmal NOW    albuterol (PROVENTIL HFA, VENTOLIN HFA, PROAIR HFA) inhaler 2 Puff  2 Puff Inhalation Q6H PRN    SUMAtriptan (IMITREX) tablet 100 mg  100 mg Oral PRN    sertraline (ZOLOFT) tablet 100 mg  100 mg Oral DAILY    sodium chloride (NS) flush 5-40 mL  5-40 mL IntraVENous Q8H    sodium chloride (NS) flush 5-40 mL  5-40 mL IntraVENous PRN    acetaminophen (TYLENOL) tablet 650 mg  650 mg Oral Q6H PRN    Or    acetaminophen (TYLENOL) suppository 650 mg  650 mg Rectal Q6H PRN    polyethylene glycol (MIRALAX) packet 17 g  17 g Oral DAILY PRN    ondansetron (ZOFRAN ODT) tablet 4 mg  4 mg Oral Q8H PRN    Or    ondansetron (ZOFRAN) injection 4 mg  4 mg IntraVENous Q6H PRN    enoxaparin (LOVENOX) injection 40 mg  40 mg SubCUTAneous Q24H    HYDROmorphone (DILAUDID) tablet 2 mg  2 mg Oral Q4H PRN    HYDROmorphone (DILAUDID) injection 0.5 mg  0.5 mg IntraVENous Q2H PRN    vancomycin (VANCOCIN) 750 mg in 0.9% sodium chloride 250 mL (VIAL-MATE)  750 mg IntraVENous Q8H    promethazine (PHENERGAN) with saline injection 25 mg  25 mg IntraVENous Q6H PRN       Problem List:  Hospital Problems as of 6/6/2021 Date Reviewed: 6/3/2021        Codes Class Noted - Resolved POA    Wound infection complicating hardware ([facility] [address] ICD-10-CM: T84. 7XXA  ICD-9-CM: 996.67  6/4/2021 - Present Unknown        Hypotension ICD-10-CM: I95.9  ICD-9-CM: 458.9  6/4/2021 - Present Unknown                 Part of this note was written by using a voice dictation software and the note has been proof read but may still contain some grammatical/other typographical errors.     Signed By: Wanda Green MD   [facility] Hospitalist Service    June 6, 2021

## 2021-06-06 NOTE — PROGRESS NOTES
Problem: Falls - Risk of  Goal: *Absence of Falls  Description: Document [place]bert [place] Fall Risk and appropriate interventions in the flowsheet.   Outcome: Progressing Towards Goal  Note: Fall Risk Interventions:  Mobility Interventions: Bed/chair exit alarm, Patient to call before getting OOB         Medication Interventions: Bed/chair exit alarm    Elimination Interventions: Bed/chair exit alarm, Call light in reach              Problem: Patient Education: Go to Patient Education Activity  Goal: Patient/Family Education  Outcome: Progressing Towards Goal     Problem: Patient Education: Go to Patient Education Activity  Goal: Patient/Family Education  Outcome: Progressing Towards Goal

## 2021-06-06 NOTE — PROGRESS NOTES
Pharmacokinetic Consult to Pharmacist    Dexter Salamanca is a 21 y.o. female being treated with vancomycin. Height: 5' 2\" (157.5 cm)  Weight: 58.6 kg (129 lb 3.2 oz)  Lab Results   Component Value Date/Time    BUN 5 (L) 06/06/2021 06:04 AM    Creatinine 0.45 (L) 06/06/2021 06:04 AM    WBC 5.4 06/06/2021 06:04 AM    Lactic acid 0.8 04/24/2021 04:20 PM      Estimated Creatinine Clearance: 101.4 mL/min (A) (by C-G formula based on SCr of 0.45 mg/dL (L)). Lab Results   Component Value Date/Time    Vancomycin,trough 18.0 06/05/2021 07:36 PM       Day 3 of vancomycin. Goal trough is 15-20. Vancomycin trough resulted at 18.0, so continue current dose of 750 mg Q8H for now. Will continue to follow patient and order levels when clinically indicated.     Thank you,  Itz Gruber, PharmD, [address]  Clinical Pharmacist  [phone]

## 2021-06-06 NOTE — PROGRESS NOTES
ACUTE PHYSICAL THERAPY GOALS:  (Developed with and agreed upon by patient and/or caregiver.)     1. Patient will perform bed mobility with INDEPENDENCE within 7 days. MET 6/6/21  2. Patient will transfer bed to chair with MODIFIED INDEPEDENCE within 7 days. MET 6/5/21  3. Patient will demonstrate FAIR+ DYNAMIC STANDING balance within 7 day(s). MET 6/5/21  4. Patient will ambulate 75ft+ using least restrictive assistive device and MINIMAL ASSISTANCE within 7 days. MET 6/6/21  5. Patient will negotiate 3 steps with 0 hand rails with SBA within 7 treatment days.       PHYSICAL THERAPY: Daily Note and PM Treatment Day # 2    Cabrera Win is a 21 y.o. female   PRIMARY DIAGNOSIS: <principal problem not specified>  Closed bimalleolar fracture of left ankle, initial encounter [S82.015G]  Wound infection complicating hardware ([address] Dirk.Gin. 7XXA]  Procedure(s) (LRB):  LEFT ANKLE HARDWARE REMOVAL (Left)  LEFT ANKLE  IRRIGATION AND DEBRIDEMENT (Left)  2 Days Post-Op    ASSESSMENT:     REHAB RECOMMENDATIONS: CURRENT LEVEL OF FUNCTION:  (Most Recently Demonstrated)   Recommendation to date pending progress:  Setting:   No further skilled therapy   Equipment:    3 in 1 Bedside Commode Bed Mobility:   Independent  Sit to Stand:   Modified Independent  Transfers:   Modified Independent  Gait/Mobility:   Standby Assistance     ASSESSMENT:  Ms. Willian Ponce is supine in bed and agreeable to therapy today. She performs bed mobility I and requires crutches for mobility secondary to NWB on L LE due to s/p day 2 hardware removal and I&D secondary to presumed infection. Stair and gait training worked on today for improving safety with stair negotiation at home. Pt SBA with gait x300 ft. SBA stair training with R railing 7 steps ascending with good form and confidence, however when ascending with railing on the L like she has at home, patient demonstrated less confidence and unsteadiness requiring verbal cues and CGA.  Pt did well descending 7 steps 2x with railing on R. Pt required Min A for attempting to ascend with no rail 3 steps like she has at home, and unsteady. PT to continue with one more treatment session during patient's acute care stay to continue with stair training to ensure safety and confidence with going home. No anticipated skilled therapy services after d/c.      SUBJECTIVE:   Ms. Froylan Chaudhry states, \"Im tired. \"    SOCIAL HISTORY/ LIVING ENVIRONMENT: See eval     OBJECTIVE:     PAIN: VITAL SIGNS: LINES/DRAINS:   Pre Treatment:    Post Treatment: 6/10   none  O2 Device: None (Room air)     MOBILITY: I Mod I S SBA CGA Min Mod Max Total  NT x2 Comments:   Bed Mobility    Rolling [x] [] [] [] [] [] [] [] [] [] []    Supine to Sit [x] [] [] [] [] [] [] [] [] [] []    Scooting [x] [] [] [] [] [] [] [] [] [] []    Sit to Supine [x] [] [] [] [] [] [] [] [] [] []    Transfers    Sit to Stand [] [x] [] [] [] [] [] [] [] [] []    Bed to Chair [] [] [] [] [] [] [] [] [] [x] []    Stand to Sit [] [x] [] [] [] [] [] [] [] [] []    I=Independent, Mod I=Modified Independent, S=Supervision, SBA=Standby Assistance, CGA=Contact Guard Assistance,   Min=Minimal Assistance, Mod=Moderate Assistance, Max=Maximal Assistance, Total=Total Assistance, NT=Not Tested    BALANCE: Good Fair+ Fair Fair- Poor NT Comments   Sitting Static [x] [] [] [] [] []    Sitting Dynamic [x] [] [] [] [] []              Standing Static [] [x] [] [] [] []    Standing Dynamic [] [x] [] [] [] []      GAIT: I Mod I S SBA CGA Min Mod Max Total  NT x2 Comments:   Level of Assistance [] [] [] [x] [] [] [] [] [] [] []    Distance 300ft    DME Crutches and Gait Belt    Gait Quality Step-through, slow but good    Weightbearing  Status NWB, L LE     I=Independent, Mod I=Modified Independent, S=Supervision, SBA=Standby Assistance, CGA=Contact Guard Assistance,   Min=Minimal Assistance, Mod=Moderate Assistance, Max=Maximal Assistance, Total=Total Assistance, NT=Not Tested    PLAN: FREQUENCY/DURATION: PT Plan of Care: Daily for duration of hospital stay or until stated goals are met, whichever comes first.  TREATMENT:     TREATMENT:   ($$ Gait Trainin-22 mins    )  Gait Training (15 Minutes): Gait training for 300 feet utilizing Crutches, Gait Belt and stair training x 7 steps 2x with R and L railing. Also attempted 3 steps no railing. . Patient required Manual, Verbal and Visual cueing to improve Activity Pacing, Assistive Device Utilization, Dynamic Standing Balance and Gait Mechanics.      TREATMENT GRID:  N/A    AFTER TREATMENT POSITION/PRECAUTIONS:  Bed, Needs within reach and RN notified    INTERDISCIPLINARY COLLABORATION:  RN/PCT and PT/PTA    TOTAL TREATMENT DURATION:  PT Patient Time In/Time Out  Time In: 1334  Time Out: [address]

## 2021-06-07 ENCOUNTER — HOME HEALTH ADMISSION (OUTPATIENT)
Dept: HOME HEALTH SERVICES | Facility: HOME HEALTH | Age: 21
End: 2021-06-07

## 2021-06-07 ENCOUNTER — APPOINTMENT (OUTPATIENT)
Dept: GENERAL RADIOLOGY | Age: 21
DRG: 493 | End: 2021-06-07
Attending: HOSPITALIST
Payer: COMMERCIAL

## 2021-06-07 ENCOUNTER — APPOINTMENT (OUTPATIENT)
Dept: CT IMAGING | Age: 21
DRG: 493 | End: 2021-06-07
Attending: STUDENT IN AN ORGANIZED HEALTH CARE EDUCATION/TRAINING PROGRAM
Payer: COMMERCIAL

## 2021-06-07 PROBLEM — R07.9 CHEST PAIN: Status: ACTIVE | Noted: 2021-06-07

## 2021-06-07 LAB
ATRIAL RATE: 91 BPM
BACTERIA SPEC CULT: ABNORMAL
BACTERIA SPEC CULT: ABNORMAL
CALCULATED P AXIS, ECG09: 50 DEGREES
CALCULATED R AXIS, ECG10: 27 DEGREES
CALCULATED T AXIS, ECG11: 43 DEGREES
CK SERPL-CCNC: 32 U/L (ref 21–215)
CREAT SERPL-MCNC: 0.55 MG/DL (ref 0.6–1)
D DIMER PPP FEU-MCNC: 0.99 UG/ML(FEU)
DIAGNOSIS, 93000: NORMAL
GRAM STN SPEC: ABNORMAL
GRAM STN SPEC: ABNORMAL
HCG SERPL QL: NEGATIVE
P-R INTERVAL, ECG05: 156 MS
Q-T INTERVAL, ECG07: 334 MS
QRS DURATION, ECG06: 84 MS
QTC CALCULATION (BEZET), ECG08: 410 MS
SERVICE CMNT-IMP: ABNORMAL
TROPONIN-HIGH SENSITIVITY: 39.2 PG/ML (ref 0–14)
VENTRICULAR RATE, ECG03: 91 BPM

## 2021-06-07 PROCEDURE — 71260 CT THORAX DX C+: CPT

## 2021-06-07 PROCEDURE — 74011250637 HC RX REV CODE- 250/637: Performed by: HOSPITALIST

## 2021-06-07 PROCEDURE — 74011250636 HC RX REV CODE- 250/636: Performed by: HOSPITALIST

## 2021-06-07 PROCEDURE — 73600 X-RAY EXAM OF ANKLE: CPT

## 2021-06-07 PROCEDURE — 74011000636 HC RX REV CODE- 636: Performed by: ORTHOPAEDIC SURGERY

## 2021-06-07 PROCEDURE — 74011250636 HC RX REV CODE- 250/636: Performed by: ORTHOPAEDIC SURGERY

## 2021-06-07 PROCEDURE — 02HV33Z INSERTION OF INFUSION DEVICE INTO SUPERIOR VENA CAVA, PERCUTANEOUS APPROACH: ICD-10-PCS | Performed by: ORTHOPAEDIC SURGERY

## 2021-06-07 PROCEDURE — 82565 ASSAY OF CREATININE: CPT

## 2021-06-07 PROCEDURE — 85379 FIBRIN DEGRADATION QUANT: CPT

## 2021-06-07 PROCEDURE — 74011250636 HC RX REV CODE- 250/636: Performed by: STUDENT IN AN ORGANIZED HEALTH CARE EDUCATION/TRAINING PROGRAM

## 2021-06-07 PROCEDURE — C1751 CATH, INF, PER/CENT/MIDLINE: HCPCS

## 2021-06-07 PROCEDURE — 36573 INSJ PICC RS&I 5 YR+: CPT | Performed by: NURSE PRACTITIONER

## 2021-06-07 PROCEDURE — 71046 X-RAY EXAM CHEST 2 VIEWS: CPT

## 2021-06-07 PROCEDURE — 65270000029 HC RM PRIVATE

## 2021-06-07 PROCEDURE — 93005 ELECTROCARDIOGRAM TRACING: CPT | Performed by: ORTHOPAEDIC SURGERY

## 2021-06-07 PROCEDURE — 74011000258 HC RX REV CODE- 258: Performed by: ORTHOPAEDIC SURGERY

## 2021-06-07 PROCEDURE — 84703 CHORIONIC GONADOTROPIN ASSAY: CPT

## 2021-06-07 PROCEDURE — 74011250637 HC RX REV CODE- 250/637: Performed by: ORTHOPAEDIC SURGERY

## 2021-06-07 PROCEDURE — 84484 ASSAY OF TROPONIN QUANT: CPT

## 2021-06-07 PROCEDURE — 82550 ASSAY OF CK (CPK): CPT

## 2021-06-07 PROCEDURE — 74011000250 HC RX REV CODE- 250: Performed by: HOSPITALIST

## 2021-06-07 PROCEDURE — 77030027138 HC INCENT SPIROMETER -A

## 2021-06-07 PROCEDURE — 36415 COLL VENOUS BLD VENIPUNCTURE: CPT

## 2021-06-07 PROCEDURE — 74011250636 HC RX REV CODE- 250/636: Performed by: PHYSICIAN ASSISTANT

## 2021-06-07 PROCEDURE — 97116 GAIT TRAINING THERAPY: CPT

## 2021-06-07 PROCEDURE — 74011000250 HC RX REV CODE- 250: Performed by: PHYSICIAN ASSISTANT

## 2021-06-07 RX ORDER — KETOROLAC TROMETHAMINE 15 MG/ML
15 INJECTION, SOLUTION INTRAMUSCULAR; INTRAVENOUS ONCE
Status: COMPLETED | OUTPATIENT
Start: 2021-06-07 | End: 2021-06-07

## 2021-06-07 RX ORDER — SODIUM CHLORIDE 0.9 % (FLUSH) 0.9 %
10 SYRINGE (ML) INJECTION
Status: COMPLETED | OUTPATIENT
Start: 2021-06-07 | End: 2021-06-07

## 2021-06-07 RX ORDER — SODIUM CHLORIDE 0.9 % (FLUSH) 0.9 %
10 SYRINGE (ML) INJECTION AS NEEDED
Status: DISCONTINUED | OUTPATIENT
Start: 2021-06-07 | End: 2021-06-08 | Stop reason: HOSPADM

## 2021-06-07 RX ORDER — SODIUM CHLORIDE 0.9 % (FLUSH) 0.9 %
10 SYRINGE (ML) INJECTION EVERY 8 HOURS
Status: DISCONTINUED | OUTPATIENT
Start: 2021-06-07 | End: 2021-06-07

## 2021-06-07 RX ADMIN — Medication 10 ML: at 22:18

## 2021-06-07 RX ADMIN — Medication 10 ML: at 23:27

## 2021-06-07 RX ADMIN — SODIUM CHLORIDE 100 ML: 900 INJECTION, SOLUTION INTRAVENOUS at 23:27

## 2021-06-07 RX ADMIN — Medication 10 ML: at 05:11

## 2021-06-07 RX ADMIN — Medication 10 ML: at 15:08

## 2021-06-07 RX ADMIN — Medication 1 AMPULE: at 22:28

## 2021-06-07 RX ADMIN — ENOXAPARIN SODIUM 40 MG: 40 INJECTION SUBCUTANEOUS at 22:17

## 2021-06-07 RX ADMIN — IOPAMIDOL 80 ML: 755 INJECTION, SOLUTION INTRAVENOUS at 23:27

## 2021-06-07 RX ADMIN — HYDROMORPHONE HYDROCHLORIDE 2 MG: 2 TABLET ORAL at 05:02

## 2021-06-07 RX ADMIN — PROMETHAZINE HYDROCHLORIDE 25 MG: 25 INJECTION INTRAMUSCULAR; INTRAVENOUS at 13:19

## 2021-06-07 RX ADMIN — KETOROLAC TROMETHAMINE 15 MG: 15 INJECTION, SOLUTION INTRAMUSCULAR; INTRAVENOUS at 20:16

## 2021-06-07 RX ADMIN — Medication 10 ML: at 20:24

## 2021-06-07 RX ADMIN — VANCOMYCIN HYDROCHLORIDE 750 MG: 750 INJECTION, POWDER, LYOPHILIZED, FOR SOLUTION INTRAVENOUS at 05:02

## 2021-06-07 RX ADMIN — PROMETHAZINE HYDROCHLORIDE 25 MG: 25 INJECTION INTRAMUSCULAR; INTRAVENOUS at 05:02

## 2021-06-07 RX ADMIN — Medication 1 AMPULE: at 13:19

## 2021-06-07 RX ADMIN — Medication 5 ML: at 13:20

## 2021-06-07 RX ADMIN — SODIUM CHLORIDE 500 MG: 9 INJECTION, SOLUTION INTRAMUSCULAR; INTRAVENOUS; SUBCUTANEOUS at 15:07

## 2021-06-07 RX ADMIN — ONDANSETRON 4 MG: 4 TABLET, ORALLY DISINTEGRATING ORAL at 10:20

## 2021-06-07 RX ADMIN — HYDROMORPHONE HYDROCHLORIDE 2 MG: 2 TABLET ORAL at 22:17

## 2021-06-07 RX ADMIN — HYDROMORPHONE HYDROCHLORIDE 2 MG: 2 TABLET ORAL at 17:37

## 2021-06-07 RX ADMIN — ONDANSETRON 4 MG: 4 TABLET, ORALLY DISINTEGRATING ORAL at 22:17

## 2021-06-07 RX ADMIN — SERTRALINE 100 MG: 100 TABLET, FILM COATED ORAL at 09:30

## 2021-06-07 NOTE — PROGRESS NOTES
[facility] Hospitalist Progress Note     Name:  Owen Lerner  Age:20 y.o. Sex:female   :  2000    MRN:  [id number]     Admit Date:  2021    Reason for Admission:  Closed bimalleolar fracture of left ankle, initial encounter [S82.842A]  Wound infection complicating hardware ([facility] [address] [A46. [address] Course/Interval history:     Pt is a 61-year-old female with past medical history significant for left bimalleolar ankle fracture status post open reduction and internal fixation about 6 weeks ago was admitted to orthopedic service with wound dehiscence, infection of the hardware. She underwent surgery yesterday and hardware was removed. After surgery she became hypotensive and hospitalist was consulted. She received 1 L IV fluid bolus and blood pressure improved. On night of  patient had fall in bathroom and subsequently started complaining of chest pain. Hospitalist was asked to come reevaluate patient for chest pain. Subjective (21):    Upon my evaluation, patient complains of substernal sharp chest pain radiating to her right shoulder. She does endorse associated symptoms of shortness of breath, nausea, and lightheadedness. She denies any diaphoresis, palpitations, near-syncope or syncope. Patient states that he fell because she felt lightheaded and dizzy when getting up to go to the bathroom. She denies any radiation to her left shoulder or jaw. Is somewhat vague with her symptoms but would describe it more as sharp shooting pain rather than crushing chest pressure. Patient does endorse previous history of León-Parkinson-White syndrome. She denies any personal history of VTE but does state that her father had history of blood clot. Review of Systems: 14 point review of systems is otherwise negative with the exception of the elements mentioned above.     Assessment & Plan     This is a 61-year-old female with:     Chest Pain   - s/p  Mechanical fall   - sounds more pleuritic than ischemic or GI   - EKG without STTW changes  - trop and D-dimer pending  - CXR pending   - currently satting 98% on RA and HR 80-90, consider CT chest to rule out PE with recent surgery, but low suspicion at this time pending D-Dimer  - Try dose IV toradol now and cont with prn pain regimen     Infected hardware of recent left bimalleolar ankle fracture status post surgery and removal post-op day1. Primary, Orthopedics. Cultures positive for MRSA. S/p vancomycin, switched to Daptomycin per ID rec's  Pain management per pain scale. Postop hypotension:  Resolved with IV fluids. Hb stable at 10.2 from 12.1 yesterday. Blood pressure stable at 102/74 - 126/73. Hypokalemia:  repleted with PO Potassium. Resolved    Diet:  DIET ADULT  DVT PPx: Lovenox  Code status: Full Code  Disposition/Expected LOS: Per primary orthopedics. Objective:     Patient Vitals for the past 24 hrs:   Temp Pulse Resp BP SpO2   06/07/21 1859  89  118/84 97 %   06/07/21 1638 98.3 °F (36.8 °C) 83 16 118/75 96 %   06/07/21 1211 98.3 °F (36.8 °C) 96 16 102/68 99 %   06/07/21 0807 98 °F (36.7 °C) 79 16 106/68 96 %   06/07/21 0416 98.2 °F (36.8 °C) 74 16 107/69 97 %   06/07/21 0023 98 °F (36.7 °C) 68 16 120/77 98 %   06/06/21 2134 97.7 °F (36.5 °C) 76 16 110/72 97 %     Oxygen Therapy  O2 Sat (%): 97 % (06/07/21 1859)  Pulse via Oximetry: 98 beats per minute (06/04/21 1109)  O2 Device: None (Room air) (06/04/21 1109)    Body mass index is 23.63 kg/m².     Physical Exam:   General:  No acute distress, speaking in full sentences, no use of accessory muscles   Chest:  Tender to palpation on left chest wall   Lungs:  Clear to auscultation bilaterally, no wheezing, no crackles  CV:  Regular rate and rhythm with normal S1 and S2, no murmurs rubs or gallops,  Abdomen:  Soft, nontender, nondistended, normoactive bowel sounds, no organomegaly,  Extremities:  No cyanosis clubbing or edema, left leg dressing in place, Neuro:  Nonfocal, A&O x3   Psych:  Normal affect     Data Review:  I have reviewed all labs, meds, and studies from the last 24 hours:    Labs:    Recent Results (from the past 24 hour(s))   CREATININE    Collection Time: 06/07/21  4:40 AM   Result Value Ref Range    Creatinine 0.55 (L) 0.6 - 1.0 MG/DL   CK    Collection Time: 06/07/21  4:40 AM   Result Value Ref Range    CK 32 21 - 215 U/L   EKG, 12 LEAD, INITIAL    Collection Time: 06/07/21  7:29 PM   Result Value Ref Range    Ventricular Rate 91 BPM    Atrial Rate 91 BPM    P-R Interval 156 ms    QRS Duration 84 ms    Q-T Interval 334 ms    QTC Calculation (Bezet) 410 ms    Calculated P Axis 50 degrees    Calculated R Axis 27 degrees    Calculated T Axis 43 degrees    Diagnosis       Normal sinus rhythm  Normal ECG  When compared with ECG of 24-APR-2021 16:57,  No significant change was found         All Micro Results     Procedure Component Value Units Date/Time    CULTURE, ANAEROBIC [[id number]] Collected: 06/04/21 0828    Order Status: Completed Specimen: Ankle Updated: 06/07/21 1152     Special Requests: NO SPECIAL REQUESTS        Culture result:       SUBCULTURE IS NECESSARY TO DETERMINE PRESENCE OR ABSENCE OF ANAEROBIC BACTERIA IN THIS CULTURE. FURTHER REPORT TO FOLLOW AFTER INCUBATION OF SUBCULTURE.           CULTURE, Rosezella Dare STAIN [[id number]] Collected: 06/04/21 0830    Order Status: Canceled Specimen: Wound from Surgical Specimen           EKG Results     Procedure 720 Value Units Date/Time    EKG, 12 LEAD, INITIAL [[id number]] Collected: 06/07/21 1929    Order Status: Completed Updated: 06/07/21 1936     Ventricular Rate 91 BPM      Atrial Rate 91 BPM      P-R Interval 156 ms      QRS Duration 84 ms      Q-T Interval 334 ms      QTC Calculation (Bezet) 410 ms      Calculated P Axis 50 degrees      Calculated R Axis 27 degrees      Calculated T Axis 43 degrees      Diagnosis --     Normal sinus rhythm  Normal ECG  When compared with ECG of 24-APR-2021 16:57,  No significant change was found            Other Studies:  No results found. Current Meds:   Current Facility-Administered Medications   Medication Dose Route Frequency    alcohol 62% (NOZIN) nasal  1 Ampule  1 Ampule Topical Q12H    DAPTOmycin (CUBICIN) 500 mg in 0.9% sodium chloride 10 mL IV Syringe  500 mg IntraVENous DAILY    sodium chloride (NS) flush 10 mL  10 mL InterCATHeter Q8H    sodium chloride (NS) flush 10 mL  10 mL InterCATHeter PRN    ketorolac (TORADOL) injection 15 mg  15 mg IntraVENous ONCE    albuterol (PROVENTIL HFA, VENTOLIN HFA, PROAIR HFA) inhaler 2 Puff  2 Puff Inhalation Q6H PRN    SUMAtriptan (IMITREX) tablet 100 mg  100 mg Oral PRN    sertraline (ZOLOFT) tablet 100 mg  100 mg Oral DAILY    sodium chloride (NS) flush 5-40 mL  5-40 mL IntraVENous Q8H    sodium chloride (NS) flush 5-40 mL  5-40 mL IntraVENous PRN    acetaminophen (TYLENOL) tablet 650 mg  650 mg Oral Q6H PRN    Or    acetaminophen (TYLENOL) suppository 650 mg  650 mg Rectal Q6H PRN    polyethylene glycol (MIRALAX) packet 17 g  17 g Oral DAILY PRN    ondansetron (ZOFRAN ODT) tablet 4 mg  4 mg Oral Q8H PRN    Or    ondansetron (ZOFRAN) injection 4 mg  4 mg IntraVENous Q6H PRN    enoxaparin (LOVENOX) injection 40 mg  40 mg SubCUTAneous Q24H    HYDROmorphone (DILAUDID) tablet 2 mg  2 mg Oral Q4H PRN    HYDROmorphone (DILAUDID) injection 0.5 mg  0.5 mg IntraVENous Q2H PRN    promethazine (PHENERGAN) with saline injection 25 mg  25 mg IntraVENous Q6H PRN       Problem List:  Hospital Problems as of 6/7/2021 Date Reviewed: 6/3/2021        Codes Class Noted - Resolved POA    Chest pain ICD-10-CM: R07.9  ICD-9-CM: 786.50  6/7/2021 - Present Unknown        Wound infection complicating hardware ([facility] ICD-10-CM: T84. 7XXA  ICD-9-CM: 996.67  6/4/2021 - Present Unknown        Hypotension ICD-10-CM: I95.9  ICD-9-CM: 458.9  6/4/2021 - Present Unknown                 Part of this note was written by using a voice dictation software and the note has been proof read but may still contain some grammatical/other typographical errors.     Signed By: Ana Sams MD   [address] [place] [address] Service    June 7, 2021
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
## 2021-06-07 NOTE — PROGRESS NOTES
2021         Post Op day: 3 Days Post-Op Procedure(s) (LRB):  LEFT ANKLE HARDWARE REMOVAL (Left)  LEFT ANKLE  IRRIGATION AND DEBRIDEMENT (Left)      Admit Date: 2021  Admit Diagnosis: Closed bimalleolar fracture of left ankle, initial encounter [S82.842A]  Wound infection complicating hardware ([address] [C37. 7XXA]       Principle Problem: <principal problem not specified>. Subjective: Doing well, No complaints, No SOB, No Chest Pain, No Nausea or Vomiting     Objective:   Vital Signs are Stable, No Acute Distress, Alert,  Dressing is Dry,  Neurovascular exam is normal.     Assessment / Plan :  Patient Active Problem List   Diagnosis Code    Wound infection complicating hardware ([address] T84. 7XXA    Hypotension I95.9      Patient Vitals for the past 8 hrs:   BP Temp Pulse Resp SpO2   21 0807 106/68 98 °F (36.7 °C) 79 16 96 %   21 0416 107/69 98.2 °F (36.8 °C) 74 16 97 %    Temp (24hrs), Av °F (36.7 °C), Min:97.7 °F (36.5 °C), Max:98.2 °F (36.8 °C)    Body mass index is 23.63 kg/m².     Lab Results   Component Value Date/Time    HGB 10.1 (L) 2021 06:04 AM          S/P Procedure(s) (LRB):  LEFT ANKLE HARDWARE REMOVAL (Left)  LEFT ANKLE  IRRIGATION AND DEBRIDEMENT (Left)    Cultures have grown MRSA: will consult ID for outpatient antibiotic plan  Medical Mgmt per hospitalist  Continue PT  Fall Precautions  DC disp: home when antibiotics arranged   F/U: 2 weeks postop for wound check and staple removal        Signed By: SHAWN Salinas  2021,  8:40 AM

## 2021-06-07 NOTE — PROGRESS NOTES
Post Fall Documentation      [address] witnessed/unwitnessed fall occurred on 06/07/2021 (Date) at 65 (Time). The answers to the following questions summarize the fall: In the patient's own words,:  · What were you attempting to do when you fell? Primary RN passing medications   · Do you know why you fell? Attempting to go to bathroom  · Do you have any pain/discomfort or any other complaints? Pt reported fall hours after fall to primary RN  · Which part of your body made contact with the floor or other object? Legs, maybe arm per patient    Nurse:  [facility] Was this an assisted fall? no   Was fall witnessed? No   If witnessed, what part of the body made contact with the floor or other object? Per pt legs, maybe arm   Patients mental status after the fall/when found: Alert and oriented   Any apparent injury:  No apparent injury   Immediate interventions for injury/suspected injury? Other xray of left leg   Patient assisted back to bed? Other pt had help from family back to bed and didnt report fall until hours after    Name of provider notified and time, any comments? Guduru        Name of family member notified and time: Brother in room at time of fall      Immediate VS and physical assessment documented in flow sheets. Neuro assessment every hour x 4 (for potential head injury or unwitnessed fall) documented in flow sheets.       Jaylyn Pruitt

## 2021-06-07 NOTE — PROGRESS NOTES
Dr. Froylan Davis called for patient condition change. STAT EKG and STAT consult to Hospitalist. X-ray on left ankle.

## 2021-06-07 NOTE — PROGRESS NOTES
PICC Placement Note    PRE-PROCEDURE VERIFICATION  Correct Procedure: yes. Time out completed with assistant Amita Dias rn and all persons present in agreement with time out. Correct Site:  yes  Temperature: Temp: 98.3 °F (36.8 °C), Temperature Source: Temp Source: Oral  Recent Labs     06/07/21  0440 06/06/21  0604   BUN  --  5*   CREA 0.55* 0.45*   PLT  --  172   WBC  --  5.4     Allergies: Latex and Pork derived (porcine)  Education materials for PICC Care given to patient or family. PROCEDURE DETAIL  A single lumen PICC line was started for antibiotic therapy. The following documentation is in addition to the PICC properties in the lines/airways flowsheet :  Lot #: [id number]  xylocaine used: yes  Mid-Arm Circumference: 28.5 (cm)  Internal Catheter Length: 37 (cm)  Internal Catheter Total Length: 37 (cm)  Vein Selection for PICC:right basilic  Central Line Bundle followed yes  Complication Related to Insertion: none  Both the insertion guidewire and ECG guidewire were removed intact all ports have positive blood return and were flush well with normal saline. The location of the tip of the PICC is verified using ECG technology. The tip is in the SVC per ECG reading. See image below.                  Line is okay to use: yes

## 2021-06-07 NOTE — PROGRESS NOTES
ACUTE PHYSICAL THERAPY GOALS:  (Developed with and agreed upon by patient and/or caregiver.)     1. Patient will perform bed mobility with INDEPENDENCE within 7 days. MET 6/6/21  2. Patient will transfer bed to chair with MODIFIED INDEPEDENCE within 7 days. MET 6/5/21  3. Patient will demonstrate FAIR+ DYNAMIC STANDING balance within 7 day(s). MET 6/5/21  4. Patient will ambulate 75ft+ using least restrictive assistive device and MINIMAL ASSISTANCE within 7 days. MET 6/6/21  5. Patient will negotiate 3 steps with 0 hand rails with SBA within 7 treatment days.       PHYSICAL THERAPY: Daily Note and PM Treatment Day # 3    Za Colón is a 21 y.o. female   PRIMARY DIAGNOSIS: <principal problem not specified>  Closed bimalleolar fracture of left ankle, initial encounter [S82.562F]  Wound infection complicating hardware ([address] Invidia.Madonna. 7XXA]  Procedure(s) (LRB):  LEFT ANKLE HARDWARE REMOVAL (Left)  LEFT ANKLE  IRRIGATION AND DEBRIDEMENT (Left)  3 Days Post-Op    ASSESSMENT:     REHAB RECOMMENDATIONS: CURRENT LEVEL OF FUNCTION:  (Most Recently Demonstrated)   Recommendation to date pending progress:  Setting:   No further skilled therapy   Equipment:    3 in 1 Bedside Commode Bed Mobility:   Independent  Sit to Stand:   Modified Independent  Transfers:   Modified Independent  Gait/Mobility:   Standby Assistance     ASSESSMENT:  Ms. Jaxson Garner is supine in bed and agreeable to therapy this afternoon, but states she does not want to try the stairs due to her arms being fatigued. Family present in room. She performs bed mobility I and requires crutches for mobility secondary to NWB on L LE due to s/p day 2 hardware removal and I&D secondary to presumed infection. Pt SBA for gait x800ft in ferreira. Pt demonstrated good standing dynamic balance as well to don gown as a robe.  PT to continue with one more treatment session during patient's acute care stay to continue with stair training to ensure safety and confidence with going home. No anticipated skilled therapy services after d/c.      SUBJECTIVE:   Ms. Milena Calix states, \"my arms are tired. \"    SOCIAL HISTORY/ LIVING ENVIRONMENT: See eval     OBJECTIVE:     PAIN: VITAL SIGNS: LINES/DRAINS:   Pre Treatment:    Post Treatment: 6/10   none  O2 Device: None (Room air)     MOBILITY: I Mod I S SBA CGA Min Mod Max Total  NT x2 Comments:   Bed Mobility    Rolling [x] [] [] [] [] [] [] [] [] [] []    Supine to Sit [x] [] [] [] [] [] [] [] [] [] []    Scooting [x] [] [] [] [] [] [] [] [] [] []    Sit to Supine [x] [] [] [] [] [] [] [] [] [] []    Transfers    Sit to Stand [] [x] [] [] [] [] [] [] [] [] []    Bed to Chair [] [] [] [] [] [] [] [] [] [x] []    Stand to Sit [] [x] [] [] [] [] [] [] [] [] []    I=Independent, Mod I=Modified Independent, S=Supervision, SBA=Standby Assistance, CGA=Contact Guard Assistance,   Min=Minimal Assistance, Mod=Moderate Assistance, Max=Maximal Assistance, Total=Total Assistance, NT=Not Tested    BALANCE: Good Fair+ Fair Fair- Poor NT Comments   Sitting Static [x] [] [] [] [] []    Sitting Dynamic [x] [] [] [] [] []              Standing Static [x] [x] [] [] [] []    Standing Dynamic [x] [x] [] [] [] []      GAIT: I Mod I S SBA CGA Min Mod Max Total  NT x2 Comments:   Level of Assistance [] [] [] [x] [] [] [] [] [] [] []    Distance 800ft    DME Crutches and Gait Belt    Gait Quality Step-through, slow but good    Weightbearing  Status NWB, L LE     I=Independent, Mod I=Modified Independent, S=Supervision, SBA=Standby Assistance, CGA=Contact Guard Assistance,   Min=Minimal Assistance, Mod=Moderate Assistance, Max=Maximal Assistance, Total=Total Assistance, NT=Not Tested    PLAN:   FREQUENCY/DURATION: PT Plan of Care: 3 times/week for duration of hospital stay or until stated goals are met, whichever comes first.  TREATMENT:     TREATMENT:   ($$ Gait Trainin-22 mins    )  Gait Training (15 Minutes): Gait training for 800 feet utilizing Crutches and Gait Belt. Patient required Verbal cueing to improve Activity Pacing.      TREATMENT GRID:  N/A    AFTER TREATMENT POSITION/PRECAUTIONS:  Bed, Needs within reach, RN notified and Visitors at bedside    INTERDISCIPLINARY COLLABORATION:  RN/PCT and PT/PTA    TOTAL TREATMENT DURATION:  PT Patient Time In/Time Out  Time In: 1435  Time Out: [address]

## 2021-06-07 NOTE — PROGRESS NOTES
Physical Therapy Note    Attempted to see patient this morning. Pt in bed with family present. Pt states she is feeling nauseas and requested to do therapy after she receivers medicine. RN notified about pt's symptoms. PT to try again at later time/date as schedule allows.    Thank you,   Brenda Gibson PT, DPT

## 2021-06-07 NOTE — PROGRESS NOTES
ID consulted by primary Orthopedics for MRSA infection of left leg, with hardware removed. BP stable off of IV fluids. Hospitalist will sign off.

## 2021-06-07 NOTE — PROGRESS NOTES
Problem: Falls - Risk of  Goal: *Absence of Falls  Description: Document Liseth Bonner Fall Risk and appropriate interventions in the flowsheet. Outcome: Progressing Towards Goal  Note: Fall Risk Interventions:  Mobility Interventions: Bed/chair exit alarm, OT consult for ADLs, Patient to call before getting OOB, PT Consult for mobility concerns         Medication Interventions: Bed/chair exit alarm, Patient to call before getting OOB, Teach patient to arise slowly    Elimination Interventions: Bed/chair exit alarm, Call light in reach, Patient to call for help with toileting needs, Toileting schedule/hourly rounds              Problem: Patient Education: Go to Patient Education Activity  Goal: Patient/Family Education  Outcome: Progressing Towards Goal     Problem: Patient Education: Go to Patient Education Activity  Goal: Patient/Family Education  Outcome: Progressing Towards Goal     Problem: Pain  Goal: *Control of Pain  Outcome: Progressing Towards Goal     Problem: Patient Education: Go to Patient Education Activity  Goal: Patient/Family Education  Outcome: Progressing Towards Goal     Problem: General Infection Care Plan (Adult and Pediatric)  Goal: Improvement in signs and symptoms of infection  Outcome: Progressing Towards Goal  Goal: *Optimize nutritional status  Outcome: Progressing Towards Goal     Problem: Patient Education: Go to Patient Education Activity  Goal: Patient/Family Education  Outcome: Progressing Towards Goal     Problem: Risk for Spread of Infection  Goal: Prevent transmission of infectious organism to others  Description: Prevent the transmission of infectious organisms to other patients, staff members, and visitors.   Outcome: Progressing Towards Goal     Problem: Patient Education:  Go to Education Activity  Goal: Patient/Family Education  Outcome: Progressing Towards Goal

## 2021-06-07 NOTE — PROGRESS NOTES
Initial visit attempted by  to convey care and concern and to explore spiritual needs. Patient was receiving care from staff. Chaplains remain available for follow-up care.      Angela Nevarez 68  Board Certified

## 2021-06-07 NOTE — PROGRESS NOTES
Patient called nurse to room stated she is having chest pain that is sharp and shooting and feeling like she can't catch her breath that started about 1850. 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 Vitals WNL and charted. Patient also said she fell around 5:00pm when she went to the bathroom. Said she fell on her injured leg and caught herself with her arms and the grab bars also.

## 2021-06-07 NOTE — PROGRESS NOTES
CM reviewed chart for ongoing discharge plans. CM consult received to assist with home health and home IV antibiotics. Patient originally admitted for ORIF of L ankle 4/19/2021. Patient was brought back to ED on 4/24/2021 for unresponsiveness at home believe to be secondary to prescription medication with no concern for overdose. Patient then developed UTI and was unable to take po antibiotics secondary to nausea and vomiting. Patient returned to ED on 6/2/2021 with abscess at incision site. Patient went to surgery with ortho for hardware removal and I&D on 6/4/20201. Hospitalist were consulted for HTN on 6/4/2021 which resolved and they have signed off as of 6/7/2021. CM saw patient previously and provided patient with 3 in 1 BSC. ID was consulted on 6/7/2021 for MRSA confirmation growth and need for home IV antibiotics. Requests as ordered:    - PICC dressing to be changed every 7 days or as needed (if damp, soiled, loose, or bloody). Please change tegaderm, end caps, biopatch, and stat lock with each dressing change.  Document in [facility] section of [facility]. -OPAT orders:   Daptomycin IV 8 mg/kg q 24 hours. EOT: 7/16/21   Routine PICC Care. Q Monday labs: Creatinine, CBC, CK, ESR, CRP, LFTs   Please fax lab results to [phone]. Therapy was unable to work with patient today in order to confirm any therapy needs at discharge. CM will add to home health orders if therapy recommends needs at discharge. Order placed for skilled nursing at home per above needs and [employer] message sent to ortho PA for clarification on any wound care needs which is slarified to dry dressing change 3x/week. Referral sent to [facility] and [facility]. [facility] liaison confirms receipt and she will confirm pricing. Single lumen PICC was placed today. Care Management Interventions  PCP Verified by CM:  Yes (Dr. Daniela Moore)  Transition of Care Consult (CM Consult): [address]: Yes  Discharge Durable Medical Equipment: Yes  Physical Therapy Consult: Yes  Occupational Therapy Consult: Yes  Speech Therapy Consult: No  Current Support Network: Own Home  Confirm Follow Up Transport: Family  The Plan for Transition of Care is Related to the Following Treatment Goals : Patient will received [facility] nursing services to assist with [facility] IV antibiotics and lab draws  Discharge Location  Discharge Placement: Home with home health

## 2021-06-07 NOTE — PROGRESS NOTES
Spoke with Dr. Mary Franco regarding consult and patients condition. New orders received. Dr. Mary Franco to come see patient.

## 2021-06-08 VITALS
HEART RATE: 90 BPM | WEIGHT: 129.2 LBS | DIASTOLIC BLOOD PRESSURE: 76 MMHG | RESPIRATION RATE: 16 BRPM | SYSTOLIC BLOOD PRESSURE: 106 MMHG | TEMPERATURE: 97.6 F | BODY MASS INDEX: 23.77 KG/M2 | HEIGHT: 62 IN | OXYGEN SATURATION: 97 %

## 2021-06-08 LAB
ANION GAP SERPL CALC-SCNC: ABNORMAL MMOL/L (ref 7–16)
BASOPHILS # BLD: 0 K/UL (ref 0–0.2)
BASOPHILS NFR BLD: 0 % (ref 0–2)
BUN SERPL-MCNC: 4 MG/DL (ref 6–23)
CALCIUM SERPL-MCNC: 8.6 MG/DL (ref 8.3–10.4)
CHLORIDE SERPL-SCNC: 107 MMOL/L (ref 98–107)
CK SERPL-CCNC: 203 U/L (ref 21–215)
CO2 SERPL-SCNC: 33 MMOL/L (ref 21–32)
CREAT SERPL-MCNC: 0.53 MG/DL (ref 0.6–1)
DIFFERENTIAL METHOD BLD: ABNORMAL
EOSINOPHIL # BLD: 0.3 K/UL (ref 0–0.8)
EOSINOPHIL NFR BLD: 6 % (ref 0.5–7.8)
ERYTHROCYTE [DISTWIDTH] IN BLOOD BY AUTOMATED COUNT: 13.4 % (ref 11.9–14.6)
GLUCOSE SERPL-MCNC: 95 MG/DL (ref 65–100)
HCT VFR BLD AUTO: 35.4 % (ref 35.8–46.3)
HGB BLD-MCNC: 11.7 G/DL (ref 11.7–15.4)
IMM GRANULOCYTES # BLD AUTO: 0 K/UL (ref 0–0.5)
IMM GRANULOCYTES NFR BLD AUTO: 0 % (ref 0–5)
LYMPHOCYTES # BLD: 2.2 K/UL (ref 0.5–4.6)
LYMPHOCYTES NFR BLD: 38 % (ref 13–44)
MCH RBC QN AUTO: 29.8 PG (ref 26.1–32.9)
MCHC RBC AUTO-ENTMCNC: 33.1 G/DL (ref 31.4–35)
MCV RBC AUTO: 90.3 FL (ref 79.6–97.8)
MONOCYTES # BLD: 0.5 K/UL (ref 0.1–1.3)
MONOCYTES NFR BLD: 8 % (ref 4–12)
NEUTS SEG # BLD: 2.8 K/UL (ref 1.7–8.2)
NEUTS SEG NFR BLD: 47 % (ref 43–78)
NRBC # BLD: 0 K/UL (ref 0–0.2)
PLATELET # BLD AUTO: 218 K/UL (ref 150–450)
PMV BLD AUTO: 10.3 FL (ref 9.4–12.3)
POTASSIUM SERPL-SCNC: 3.6 MMOL/L (ref 3.5–5.1)
POTASSIUM SERPL-SCNC: 5.4 MMOL/L (ref 3.5–5.1)
RBC # BLD AUTO: 3.92 M/UL (ref 4.05–5.2)
SODIUM SERPL-SCNC: 139 MMOL/L (ref 136–145)
WBC # BLD AUTO: 5.8 K/UL (ref 4.3–11.1)

## 2021-06-08 PROCEDURE — 85025 COMPLETE CBC W/AUTO DIFF WBC: CPT

## 2021-06-08 PROCEDURE — 82550 ASSAY OF CK (CPK): CPT

## 2021-06-08 PROCEDURE — 74011250637 HC RX REV CODE- 250/637: Performed by: ORTHOPAEDIC SURGERY

## 2021-06-08 PROCEDURE — 74011000250 HC RX REV CODE- 250: Performed by: HOSPITALIST

## 2021-06-08 PROCEDURE — 80048 BASIC METABOLIC PNL TOTAL CA: CPT

## 2021-06-08 PROCEDURE — 74011250636 HC RX REV CODE- 250/636: Performed by: HOSPITALIST

## 2021-06-08 PROCEDURE — 74011250636 HC RX REV CODE- 250/636: Performed by: PHYSICIAN ASSISTANT

## 2021-06-08 PROCEDURE — 84132 ASSAY OF SERUM POTASSIUM: CPT

## 2021-06-08 PROCEDURE — 74011250637 HC RX REV CODE- 250/637: Performed by: HOSPITALIST

## 2021-06-08 PROCEDURE — 74011000250 HC RX REV CODE- 250: Performed by: PHYSICIAN ASSISTANT

## 2021-06-08 RX ORDER — HYDROMORPHONE HYDROCHLORIDE 2 MG/1
2 TABLET ORAL
Qty: 18 TABLET | Refills: 0 | Status: SHIPPED | OUTPATIENT
Start: 2021-06-08 | End: 2021-06-11

## 2021-06-08 RX ADMIN — HYDROMORPHONE HYDROCHLORIDE 2 MG: 2 TABLET ORAL at 02:10

## 2021-06-08 RX ADMIN — PROMETHAZINE HYDROCHLORIDE 25 MG: 25 INJECTION INTRAMUSCULAR; INTRAVENOUS at 02:10

## 2021-06-08 RX ADMIN — ONDANSETRON 4 MG: 4 TABLET, ORALLY DISINTEGRATING ORAL at 08:07

## 2021-06-08 RX ADMIN — Medication 10 ML: at 13:15

## 2021-06-08 RX ADMIN — SERTRALINE 100 MG: 100 TABLET, FILM COATED ORAL at 08:07

## 2021-06-08 RX ADMIN — SODIUM CHLORIDE 500 MG: 9 INJECTION, SOLUTION INTRAMUSCULAR; INTRAVENOUS; SUBCUTANEOUS at 16:10

## 2021-06-08 RX ADMIN — Medication 1 AMPULE: at 08:07

## 2021-06-08 RX ADMIN — PROMETHAZINE HYDROCHLORIDE 25 MG: 25 INJECTION INTRAMUSCULAR; INTRAVENOUS at 11:22

## 2021-06-08 RX ADMIN — HYDROMORPHONE HYDROCHLORIDE 2 MG: 2 TABLET ORAL at 08:07

## 2021-06-08 NOTE — PROGRESS NOTES
Medication orders will remain the same and attending notified. Call placed to the outpatient infusion center to set up infusion times. Message left. CM in to speak with patient and mother at bedside. Mother is concerned regarding ability to transport patient to and from outpatient infusion center daily for 6 weeks. ID confirms there is no other medication choice appropriate. Education provided on benefit coverage and that the insurance she pays for does not include home IV antibiotics coverage. Coverage details found under \"media\" in patient's chart and provided to patient and mother for review. CM will await return call from . [facility] liaison notified that patient will not need skilled nursing for IV care/labs at home. Dry dressing is not considered a skilled need and will not suffice for HH to see patient.

## 2021-06-08 NOTE — PROGRESS NOTES
Notified Dr. Tim Longoria of patient's CT results that showed no evidence of a PE. No new orders received.

## 2021-06-08 NOTE — PROGRESS NOTES
Patient given discharge instructions with time to ask questions. Patient discharged with belonging in hand.

## 2021-06-08 NOTE — DISCHARGE SUMMARY
[facility] Discharge Summary       Patient ID:  Imelda Rodriguez  [id number]  72 y.o.  2000  Admit date: 6/4/2021  Discharge date: 6/8/2021  Attending: Nestor Rubio MD  PCP:  Kg Carmen MD  Treatment Team: Attending Provider: Nestor Rubio MD; Care Manager: Jamila Sousa; Consulting Provider: Burak Mcknight MD; Care Manager: Ladonna Strickland RN; Consulting Provider: Hortencia Manriquez MD; Charge Nurse: Edwige Diaz    Principal Diagnosis: Infected left ankle hardware     Hospital Problems as of 6/8/2021 Date Reviewed: 6/3/2021        Codes Class Noted - Resolved POA    Chest pain ICD-10-CM: R07.9  ICD-9-CM: 786.50  6/7/2021 - Present Unknown        Wound infection complicating hardware ([facility] [address] ICD-10-CM: T84. 7XXA  ICD-9-CM: 996.67  6/4/2021 - Present Unknown        Hypotension ICD-10-CM: I95.9  ICD-9-CM: 458.9  6/4/2021 - Present Unknown                  Hospital Course:  Please refer to the admission H&P for details of presentation. In summary, the patient developed a postoperative hardware infection of the left ankle. She underwent explant . Intraoperative cultures grew MRSA. Infectious Disease was consulted, obtained picc line and ordered daptomycin x 6 week treatment plan. Patient DC home one antibiotics were arranged. Significant Diagnostic Studies:    Labs: Results:       Chemistry Recent Labs     06/07/21  0440 06/06/21  0604   GLU  --  78   NA  --  144   K  --  3.7   CL  --  111*   CO2  --  28   BUN  --  5*   CREA 0.55* 0.45*   CA  --  7.5*   AGAP  --  5*      CBC w/Diff Recent Labs     06/06/21  0604   WBC 5.4   RBC 3.43*   HGB 10.1*   HCT 31.6*      GRANS 35*   LYMPH 53*   EOS 3      Cardiac Enzymes Recent Labs     06/07/21  0440   CPK 32      Coagulation No results for input(s): PTP, INR, APTT, INREXT in the last 72 hours. BNP No results for input(s): BNPP in the last 72 hours.    Liver Enzymes No results for input(s): TP, ALB, TBIL, AP in the last 72 hours. No lab exists for component: SGOT, GPT, DBIL   Thyroid Studies No results found for: T4, T3U, TSH, TSHEXT         Imaging:  XR CHEST PA LAT    Result Date: 6/7/2021  1. Cast material overlies the left ankle. 2. No consolidation. XR ANKLE LT AP/LAT    Result Date: 6/7/2021  1. Cast material overlies the left ankle. 2. No consolidation. XR ANKLE LT MIN 3 V    Result Date: 6/4/2021  Status post hardware removal.    CT CHEST PULMONARY EMBOLISM    Result Date: 6/8/2021  1. No evidence of pulmonary embolism. 2. No acute intrathoracic process. Microbiology/Cultures: All Micro Results     Procedure Component Value Units Date/Time    CULTURE, ANAEROBIC [[id number]] Collected: 06/04/21 0828    Order Status: Completed Specimen: Ankle Updated: 06/07/21 1152     Special Requests: NO SPECIAL REQUESTS        Culture result:       SUBCULTURE IS NECESSARY TO DETERMINE PRESENCE OR ABSENCE OF ANAEROBIC BACTERIA IN THIS CULTURE. FURTHER REPORT TO FOLLOW AFTER INCUBATION OF SUBCULTURE. CULTURE, Charmaine Murphy [[id number]] Collected: 06/04/21 0830    Order Status: Canceled Specimen: Wound from Surgical Specimen           Discharge Exam:  Visit Vitals  /71 (BP 1 Location: Left upper arm, BP Patient Position: At rest)   Pulse 96   Temp 98.6 °F (37 °C)   Resp 16   Ht 5' 2\" (1.575 m)   Wt 129 lb 3.2 oz (58.6 kg)   LMP 06/03/2021   SpO2 98%   BMI 23.63 kg/m²     General appearance: alert, cooperative, no distress, appears stated age  Lungs: clear to auscultation bilaterally  Heart: regular rate and rhythm  Abdomen: soft, non-tender. Bowel sounds normal. No masses,  no organomegaly  Skin: dressing dry.  NV intact  Extremities: no cyanosis or edema  Neurologic: Grossly normal    Disposition: home  Discharge Condition: stable  Patient Instructions:   Current Discharge Medication List      START taking these medications    Details   HYDROmorphone (DILAUDID) 2 mg tablet Take 1 Tablet by mouth every four (4) hours as needed for Pain for up to 3 days. Max Daily Amount: 12 mg.  Qty: 18 Tablet, Refills: 0  Start date: 6/8/2021, End date: 6/11/2021    Associated Diagnoses: Hardware complicating wound infection, initial encounter ([facility] [address]      DAPTOmycin (CUBICIN) 50 mg/mL IV Syringe 10 mL by IntraVENous route daily for 42 days. Qty: 420 mL, Refills: 0  Start date: 6/8/2021, End date: 7/20/2021         CONTINUE these medications which have NOT CHANGED    Details   sertraline (ZOLOFT) 100 mg tablet 100 mg daily as needed. rizatriptan (Maxalt) 10 mg tablet Take 10 mg by mouth once as needed for Migraine. May repeat in 2 hours if needed      albuterol (PROVENTIL HFA, VENTOLIN HFA, PROAIR HFA) 90 mcg/actuation inhaler TAKE 2 PUFFS, 2 4 TIMES PER DAY AS NEEDED. ALBUTEROL 90 mcg/Actuation inhaler take 2 Puffs by inhalation every six (6) hours as needed for Wheezing.              Activity: Ambulate in house  Diet: Regular Diet  Wound Care: Keep wound clean and dry    Follow-up: 2 weeks postop or sooner if needed     Electronically Signed:  SHAWN Ingram  6/8/2021  10:21 AM

## 2021-06-08 NOTE — PROGRESS NOTES
Follow up call placed to [facility] with scheduling at OP infusion center. She has stepped out momentarily. Message left.

## 2021-06-08 NOTE — PROGRESS NOTES
2021         Post Op day: 4 Days Post-Op Procedure(s) (LRB):  LEFT ANKLE HARDWARE REMOVAL (Left)  LEFT ANKLE  IRRIGATION AND DEBRIDEMENT (Left)      Admit Date: 2021  Admit Diagnosis: Closed bimalleolar fracture of left ankle, initial encounter [S82.842A]  Wound infection complicating hardware ([address] [X94. 7XXA]       Principle Problem: <principal problem not specified>. Subjective: Doing well, No complaints, No SOB, No Chest Pain, No Nausea or Vomiting     Objective:   Vital Signs are Stable, No Acute Distress, Alert,  Dressing is Dry,  Neurovascular exam is normal.     Assessment / Plan :  Patient Active Problem List   Diagnosis Code    Wound infection complicating hardware ([address] T84. 7XXA    Hypotension I95.9    Chest pain R07.9      Patient Vitals for the past 8 hrs:   BP Temp Pulse Resp SpO2   21 0800 101/71 98.6 °F (37 °C) 96 16 98 %   21 0348 132/85 97.4 °F (36.3 °C) 72 16 100 %    Temp (24hrs), Av.2 °F (36.8 °C), Min:97.4 °F (36.3 °C), Max:98.6 °F (37 °C)    Body mass index is 23.63 kg/m².     Lab Results   Component Value Date/Time    HGB 10.1 (L) 2021 06:04 AM          S/P Procedure(s) (LRB):  LEFT ANKLE HARDWARE REMOVAL (Left)  LEFT ANKLE  IRRIGATION AND DEBRIDEMENT (Left)    PICC line placed: antibiotics per ID  Medical Mgmt per hospitalist  Fall Precautions  DC disp: Home  F/U: 2 weeks postop for wound check and staple removal        Signed By: SHAWN Miles  2021,  9:52 AM

## 2021-06-08 NOTE — PROGRESS NOTES
Problem: Falls - Risk of  Goal: *Absence of Falls  Description: Document Nidia Junior Fall Risk and appropriate interventions in the flowsheet.   6/8/2021 1636 by Tanisha Aw  Outcome: Resolved/Met  6/8/2021 0927 by Car ROSS  Outcome: Progressing Towards Goal  Note: Fall Risk Interventions:  Mobility Interventions: Bed/chair exit alarm         Medication Interventions: Bed/chair exit alarm    Elimination Interventions: Call light in reach    History of Falls Interventions: Bed/chair exit alarm         Problem: Patient Education: Go to Patient Education Activity  Goal: Patient/Family Education  6/8/2021 1636 by Clent Aw  Outcome: Resolved/Met  6/8/2021 0927 by Clent Aw  Outcome: Progressing Towards Goal     Problem: Patient Education: Go to Patient Education Activity  Goal: Patient/Family Education  6/8/2021 1636 by Clent Aw  Outcome: Resolved/Met  6/8/2021 0927 by Clent Aw  Outcome: Progressing Towards Goal     Problem: Pain  Goal: *Control of Pain  6/8/2021 1636 by Clent Aw  Outcome: Resolved/Met  6/8/2021 0927 by Clent Aw  Outcome: Progressing Towards Goal     Problem: Patient Education: Go to Patient Education Activity  Goal: Patient/Family Education  6/8/2021 1636 by Clent Aw  Outcome: Resolved/Met  6/8/2021 0927 by Clent Aw  Outcome: Progressing Towards Goal     Problem: General Infection Care Plan (Adult and Pediatric)  Goal: Improvement in signs and symptoms of infection  6/8/2021 1636 by Clent Aw  Outcome: Resolved/Met  6/8/2021 0927 by Clent Aw  Outcome: Progressing Towards Goal  Goal: *Optimize nutritional status  6/8/2021 1636 by Clent Aw  Outcome: Resolved/Met  6/8/2021 0927 by Clent Aw  Outcome: Progressing Towards Goal     Problem: Patient Education: Go to Patient Education Activity  Goal: Patient/Family Education  6/8/2021 1636 by Clent Aw  Outcome: Resolved/Met  6/8/2021 0927 by Clent Aw  Outcome: Progressing Towards Goal     Problem: Risk for Spread of Infection  Goal: Prevent transmission of infectious organism to others  Description: Prevent the transmission of infectious organisms to other patients, staff members, and visitors.   6/8/2021 1636 by Lynda Juanito  Outcome: Resolved/Met  6/8/2021 0927 by Lynda Delay  Outcome: Progressing Towards Goal     Problem: Patient Education:  Go to Education Activity  Goal: Patient/Family Education  6/8/2021 1636 by Lynda Juanito  Outcome: Resolved/Met  6/8/2021 0927 by Lynda Juanito  Outcome: Progressing Towards Goal

## 2021-06-08 NOTE — PROGRESS NOTES
Per chest x-ray picc line in right atrium so dressing removed, site cleanedand line retracted 2cm new dressing, end caps, stat lock, and bio patch applied using sterile procedure. Line flushes well with good blood return. No s/s of infection.

## 2021-06-08 NOTE — PROGRESS NOTES
Problem: Falls - Risk of  Goal: *Absence of Falls  Description: Document Nicholas Nuñez Fall Risk and appropriate interventions in the flowsheet. Outcome: Progressing Towards Goal  Note: Fall Risk Interventions:  Mobility Interventions: Bed/chair exit alarm         Medication Interventions: Bed/chair exit alarm    Elimination Interventions: Call light in reach    History of Falls Interventions: Bed/chair exit alarm         Problem: Patient Education: Go to Patient Education Activity  Goal: Patient/Family Education  Outcome: Progressing Towards Goal     Problem: Patient Education: Go to Patient Education Activity  Goal: Patient/Family Education  Outcome: Progressing Towards Goal     Problem: Pain  Goal: *Control of Pain  Outcome: Progressing Towards Goal     Problem: Patient Education: Go to Patient Education Activity  Goal: Patient/Family Education  Outcome: Progressing Towards Goal     Problem: General Infection Care Plan (Adult and Pediatric)  Goal: Improvement in signs and symptoms of infection  Outcome: Progressing Towards Goal  Goal: *Optimize nutritional status  Outcome: Progressing Towards Goal     Problem: Patient Education: Go to Patient Education Activity  Goal: Patient/Family Education  Outcome: Progressing Towards Goal     Problem: Risk for Spread of Infection  Goal: Prevent transmission of infectious organism to others  Description: Prevent the transmission of infectious organisms to other patients, staff members, and visitors.   Outcome: Progressing Towards Goal     Problem: Patient Education:  Go to Education Activity  Goal: Patient/Family Education  Outcome: Progressing Towards Goal

## 2021-06-08 NOTE — PROGRESS NOTES
Left message with vascular access team to check patient's PICC to see if it could be causing her the pain and other issues.

## 2021-06-08 NOTE — PROGRESS NOTES
Follow up call placed to outpatient infusion center. Maricruz Ellington has left for the day. Current  placed patient on schedule for first infusion tomorrow, 6/9/2021, at 4:00 PM.  This was added to patient discharge paperwork and primary RN notified.

## 2021-06-08 NOTE — DISCHARGE INSTRUCTIONS
[facility]      Patient Discharge Instructions    [facility] / [id number] : 2000    Admitted 2021 Discharged: 2021     IF YOU HAVE ANY PROBLEMS ONCE YOU ARE AT HOME CALL THE FOLLOWING NUMBERS:   Main office number: [phone] ask for Christina Ayala (medical assistant with Dr. Raman Mitchell)  Office Address: [facility] [address] [facility], [address] [facility]        Weight bearing status: As Tolerated     Activity  · As tolerated   · Keep dressing dry and clean       Diet  · Regular      Medications    · The medications you are to continue on are listed on the medication reconciliation sheet. · Narcotic pain medications as well as supplemental iron can cause constipation. If this occurs try stopping the narcotic pain medication and/or the iron. · It is important that you take the medication exactly as they are prescribed. · Keep your medication in the bottles provided by the pharmacist and keep a list of the medication names, dosages, and times to be taken in your wallet. · Do not take other medications without consulting your doctor. Other Important Information    Do NOT smoke as this will greatly increase your risk of infection! Do not drive and operate hazardous machinery until being cleared to do so by your doctor     Swelling and warmth is normal for 6 months after surgery. If you experience swelling in your leg elevate you leg while laying down with your toes above your heart. If you have sudden onset severe swelling with leg pain call our office. The stitches deep inside take approximately 6 months to dissolve. There will be sharp shooting, stinging and burning pain. This is normal and will resolve between 3-6 months after surgery. Difficulty sleeping is normal following surgery. You may try melatonin, an over-the-counter sleep aid or benadryl to help with sleep. Most patients will resume sleeping through the night 5-8 weeks after surgery.       Home Health will contact you within 48 hrs of discharge that you have chosen. If you have not received a call within this time frame please contact that provider you chose. You should be given this information before you leave the hospital.     You are at a risk for falls. Use the rolling walker when walking. Patients should not drive if they are still taking narcotic pain mediation during the daytime hours. Most patients wean themselves off of pain medication within 2-5 weeks after surgery. Please give a list of your current medications to your Primary Care Provider and update this list whenever your medications are discontinued, doses are changed, or new medications (including over-the-counter products) are added. Please carry medication information at all times in case of emergency situations. When to Call the office    - If you have a temperature greater then 101  - Excessive bleeding that does not stop after holding pressure over the area  - Excessive redness, swelling or bruising, and/ or green or yellow, smelly discharge from incision  - Uncontrolled vomiting   - Loose control of your bladder or bowel function  - Need a pain medication refill   - Need to change your follow up appointment     Information obtained by :  I understand that if any problems occur once I am at home I am to contact my physician. I understand and acknowledge receipt of the instructions indicated above.                                                                                                                                            Physician's or R.N.'s Signature                                                                  Date/Time                                                                                                                                              Patient or Representative Signature                                                          Date/Time

## 2021-06-08 NOTE — PROGRESS NOTES
[facility] liaison reports that patient has no home infusion coverage thru her insurance and self pay cost for [facility] only would be $569.00 per day. IMARCIA HEAD, Karlene Quintana notified.

## 2021-06-08 NOTE — PROGRESS NOTES
[facility]ist Progress Note     Name:  Pamela Pate  Age:20 y.o. Sex:female   :  2000    MRN:  [id number]     Admit Date:  2021    Reason for Admission:  Closed bimalleolar fracture of left ankle, initial encounter [S82.842A]  Wound infection complicating hardware ([facility] [address] [B88. [address] Course/Interval history: This is a 51-year-old female with past medical history significant for left bimalleolar ankle fracture status post open reduction and internal fixation about 6 weeks ago was admitted to orthopedic service with wound dehiscence, infection of the hardware. She underwent surgery yesterday and hardware was removed. After surgery she became hypotensive and hospitalist was consulted. She received 1 L IV fluid bolus and blood pressure improved. Patient's blood pressure was stable off of IV fluids for 48 to 72 hours and hospitalist signed off. ID was consulted and patient will be on IV daptomycin for 6 weeks. PICC line placed. Unfortunately on , patient reportedly fell in the bathroom unwitnessed. She started complaining of chest pain after the fall. D-dimer was mildly elevated. CTA  chest was done and negative for PE. Subjective (21): Patient complained of dizziness this morning. No chest pain. No fever no chills. No nausea no vomiting no abdominal pain. Review of Systems: 14 point review of systems is otherwise negative with the exception of the elements mentioned above. 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 Primary, Orthopedics. Cultures positive for MRSA. Pain management per pain scale. ID consult per Primary Orthopedics.  PICC line in place. Daptomycin for 6 weeks. Postop hypotension: resolved  Hb stable at 10.1. Blood pressure stable off of IV fluids.     patient complained of dizziness. Checked orthostatic vital signs and negative. Chest pain  Musculoskeletal from fall. EKG, troponin negative. Mildly elevated D-dimer likely reactive from postop. CTA chest done and negative for PE. Hypokalemia:   Potassium this a.m. is 5.4. Hemolyzed specimen. Recheck potassium is 3.6. Diet:  DIET ADULT  DVT PPx: Lovenox  Code status: Full Code  Disposition/Expected LOS: Home today Per primary orthopedics. Objective:     Patient Vitals for the past 24 hrs:   Temp Pulse Resp BP SpO2   06/08/21 1221 97.6 °F (36.4 °C) 90 16 103/68 97 %   06/08/21 0800 98.6 °F (37 °C) 96 16 101/71 98 %   06/08/21 0348 97.4 °F (36.3 °C) 72 16 132/85 100 %   06/07/21 2344 98 °F (36.7 °C) 81 16 123/78 100 %   06/2000 98.4 °F (36.9 °C) 86 16 126/81 98 %   06/07/21 1859  89  118/84 97 %   06/07/21 1638 98.3 °F (36.8 °C) 83 16 118/75 96 %     Oxygen Therapy  O2 Sat (%): 97 % (06/08/21 1221)  Pulse via Oximetry: 98 beats per minute (06/04/21 1109)  O2 Device: None (Room air) (06/04/21 1109)    Body mass index is 23.63 kg/m².     Physical Exam:   General:  No acute distress, speaking in full sentences, no use of accessory muscles   Lungs:  Clear to auscultation bilaterally, no wheezing, no crackles  CV:  Regular rate and rhythm with normal S1 and S2, no murmurs rubs or gallops,  Abdomen:  Soft, nontender, nondistended, normoactive bowel sounds, no organomegaly,  Extremities:  No cyanosis clubbing or edema, left leg dressing in place,   Neuro:  Nonfocal, A&O x3   Psych:  Normal affect     Data Review:  I have reviewed all labs, meds, and studies from the last 24 hours:    Labs:    Recent Results (from the past 24 hour(s))   EKG, 12 LEAD, INITIAL    Collection Time: 06/07/21  7:29 PM   Result Value Ref Range    Ventricular Rate 91 BPM    Atrial Rate 91 BPM    P-R Interval 156 ms    QRS Duration 84 ms    Q-T Interval 334 ms    QTC Calculation (Bezet) 410 ms    Calculated P Axis 50 degrees    Calculated R Axis 27 degrees    Calculated T Axis 43 degrees    Diagnosis       Normal sinus rhythm  Normal ECG  When compared with ECG of 24-APR-2021 16:57,  No significant change was found  Confirmed by [facility]  MD ()RADHA ([id number]) on 6/7/2021 9:12:12 PM     D DIMER    Collection Time: 06/07/21  8:21 PM   Result Value Ref Range    D DIMER 0.99 (H) <0.56 ug/ml(FEU)   TROPONIN-HIGH SENSITIVITY    Collection Time: 06/07/21  8:21 PM   Result Value Ref Range    Troponin-High Sensitivity 39.2 (H) 0 - 14 pg/mL   HCG QL SERUM    Collection Time: 06/07/21 10:22 PM   Result Value Ref Range    HCG, Ql. Negative NEG     CBC WITH AUTOMATED DIFF    Collection Time: 06/08/21 10:43 AM   Result Value Ref Range    WBC 5.8 4.3 - 11.1 K/uL    RBC 3.92 (L) 4.05 - 5.2 M/uL    HGB 11.7 11.7 - 15.4 g/dL    HCT 35.4 (L) 35.8 - 46.3 %    MCV 90.3 79.6 - 97.8 FL    MCH 29.8 26.1 - 32.9 PG    MCHC 33.1 31.4 - 35.0 g/dL    RDW 13.4 11.9 - 14.6 %    PLATELET 449 198 - 954 K/uL    MPV 10.3 9.4 - 12.3 FL    ABSOLUTE NRBC 0.00 0.0 - 0.2 K/uL    DF AUTOMATED      NEUTROPHILS 47 43 - 78 %    LYMPHOCYTES 38 13 - 44 %    MONOCYTES 8 4.0 - 12.0 %    EOSINOPHILS 6 0.5 - 7.8 %    BASOPHILS 0 0.0 - 2.0 %    IMMATURE GRANULOCYTES 0 0.0 - 5.0 %    ABS. NEUTROPHILS 2.8 1.7 - 8.2 K/UL    ABS. LYMPHOCYTES 2.2 0.5 - 4.6 K/UL    ABS. MONOCYTES 0.5 0.1 - 1.3 K/UL    ABS. EOSINOPHILS 0.3 0.0 - 0.8 K/UL    ABS. BASOPHILS 0.0 0.0 - 0.2 K/UL    ABS. IMM.  GRANS. 0.0 0.0 - 0.5 K/UL   METABOLIC PANEL, BASIC    Collection Time: 06/08/21 10:43 AM   Result Value Ref Range    Sodium 139 136 - 145 mmol/L    Potassium 5.4 (H) 3.5 - 5.1 mmol/L    Chloride 107 98 - 107 mmol/L    CO2 33 (H) 21 - 32 mmol/L    Anion gap Cannot be calculated 7 - 16 mmol/L    Glucose 95 65 - 100 mg/dL    BUN 4 (L) 6 - 23 MG/DL    Creatinine 0.53 (L) 0.6 - 1.0 MG/DL    GFR est AA >60 >60 ml/min/1.73m2    GFR est non-AA >60 >60 ml/min/1.73m2    Calcium 8.6 8.3 - 10.4 MG/DL   CK    Collection Time: 06/08/21 10:43 AM   Result Value Ref Range     21 - 215 U/L       All Micro Results     Procedure Component Value Units Date/Time    CULTURE, ANAEROBIC [[id number]] Collected: 06/04/21 0828    Order Status: Completed Specimen: Ankle Updated: 06/07/21 1152     Special Requests: NO SPECIAL REQUESTS        Culture result:       SUBCULTURE IS NECESSARY TO DETERMINE PRESENCE OR ABSENCE OF ANAEROBIC BACTERIA IN THIS CULTURE. FURTHER REPORT TO FOLLOW AFTER INCUBATION OF SUBCULTURE. CULTURE, Verline Greulich STAIN [[id number]] Collected: 06/04/21 0830    Order Status: Canceled Specimen: Wound from Surgical Specimen           EKG Results     Procedure 720 Value Units Date/Time    EKG, 12 LEAD, INITIAL [[id number]] Collected: 06/07/21 1929    Order Status: Completed Updated: 06/07/21 2112     Ventricular Rate 91 BPM      Atrial Rate 91 BPM      P-R Interval 156 ms      QRS Duration 84 ms      Q-T Interval 334 ms      QTC Calculation (Bezet) 410 ms      Calculated P Axis 50 degrees      Calculated R Axis 27 degrees      Calculated T Axis 43 degrees      Diagnosis --     Normal sinus rhythm  Normal ECG  When compared with ECG of 24-APR-2021 16:57,  No significant change was found  Confirmed by Mike Eaton MD (), RADHA RUSSELL ([id number]) on 6/7/2021 9:12:12 PM            Other Studies:  XR CHEST PA LAT    Result Date: 6/7/2021  HISTORY:  s/p patient reported fall  ; chest pain and shortness of breath LEFT ANKLE 2 VIEWS: Cast material overlies the ankle. The ankle mortise is well aligned. There is a fracture deformity through the distal fibula and there are screw tracks from hardware that has been removed. AP LATERAL CHEST X-RAY: COMPARISON is made to previous study March 17, 2007. A right-sided PICC line is present with catheter tip in the right atrium. There is no consolidation or pleural effusions. Cholecystectomy clips are present. 1. Cast material overlies the left ankle. 2. No consolidation.     XR ANKLE LT AP/LAT    Result Date: 6/7/2021  HISTORY:  s/p patient reported fall  ; chest pain and shortness of breath LEFT ANKLE 2 VIEWS: Cast material overlies the ankle. The ankle mortise is well aligned. There is a fracture deformity through the distal fibula and there are screw tracks from hardware that has been removed. AP LATERAL CHEST X-RAY: COMPARISON is made to previous study March 17, 2007. A right-sided PICC line is present with catheter tip in the right atrium. There is no consolidation or pleural effusions. Cholecystectomy clips are present. 1. Cast material overlies the left ankle. 2. No consolidation. CT CHEST PULMONARY EMBOLISM    Result Date: 6/8/2021  EXAM: CT angiogram chest. HISTORY: pleuritic chest pain, post-op. TECHNIQUE: CT angiographic images of the chest were obtained following intravenous administration of contrast. Imaging was performed utilizing PE protocol. Examination was performed in the axial plane and coronal reconstructions were performed. CT scan performed using appropriate/available dose optimization/reduction/ALARA techniques. COMPARISON: None. FINDINGS: There is adequate opacification of the pulmonary arterial tree. No significant filling defects are identified to suggest pulmonary embolism. Main pulmonary artery is normal in caliber. The heart is not enlarged and there is no pericardial effusion. The great vessels appear normal. The visualized thyroid is unremarkable. There are no pathologically enlarged mediastinal hilar or axillary lymph nodes. Trachea and mainstem bronchi are patent. There is no consolidation, pleural effusion, or pneumothorax. No parenchymal nodules are seen. There is a nodule noted along the lateral aspect of the major fissure on the right. This may represent a fissural lymph node. The visualized upper abdomen is unremarkable. Osseous structures are within normal limits. 1. No evidence of pulmonary embolism. 2. No acute intrathoracic process.        Current Meds: Current Facility-Administered Medications   Medication Dose Route Frequency    alcohol 62% (NOZIN) nasal  1 Ampule  1 Ampule Topical Q12H    DAPTOmycin (CUBICIN) 500 mg in 0.9% sodium chloride 10 mL IV Syringe  500 mg IntraVENous DAILY    sodium chloride (NS) flush 10 mL  10 mL InterCATHeter PRN    albuterol (PROVENTIL HFA, VENTOLIN HFA, PROAIR HFA) inhaler 2 Puff  2 Puff Inhalation Q6H PRN    SUMAtriptan (IMITREX) tablet 100 mg  100 mg Oral PRN    sertraline (ZOLOFT) tablet 100 mg  100 mg Oral DAILY    sodium chloride (NS) flush 5-40 mL  5-40 mL IntraVENous Q8H    sodium chloride (NS) flush 5-40 mL  5-40 mL IntraVENous PRN    acetaminophen (TYLENOL) tablet 650 mg  650 mg Oral Q6H PRN    Or    acetaminophen (TYLENOL) suppository 650 mg  650 mg Rectal Q6H PRN    polyethylene glycol (MIRALAX) packet 17 g  17 g Oral DAILY PRN    ondansetron (ZOFRAN ODT) tablet 4 mg  4 mg Oral Q8H PRN    Or    ondansetron (ZOFRAN) injection 4 mg  4 mg IntraVENous Q6H PRN    enoxaparin (LOVENOX) injection 40 mg  40 mg SubCUTAneous Q24H    HYDROmorphone (DILAUDID) tablet 2 mg  2 mg Oral Q4H PRN    HYDROmorphone (DILAUDID) injection 0.5 mg  0.5 mg IntraVENous Q2H PRN    promethazine (PHENERGAN) with saline injection 25 mg  25 mg IntraVENous Q6H PRN       Problem List:  Hospital Problems as of 6/8/2021 Date Reviewed: 6/3/2021        Codes Class Noted - Resolved POA    Chest pain ICD-10-CM: R07.9  ICD-9-CM: 786.50  6/7/2021 - Present Unknown        Wound infection complicating hardware ([facility] [address] ICD-10-CM: T84. 7XXA  ICD-9-CM: 996.67  6/4/2021 - Present Unknown        Hypotension ICD-10-CM: I95.9  ICD-9-CM: 458.9  6/4/2021 - Present Unknown                 Part of this note was written by using a voice dictation software and the note has been proof read but may still contain some grammatical/other typographical errors.     Signed By: Thomas Martinez MD   [facility] Hospitalist Service    June 8, 2021

## 2021-06-09 ENCOUNTER — HOSPITAL ENCOUNTER (OUTPATIENT)
Dept: INFUSION THERAPY | Age: 21
Discharge: HOME | End: 2021-06-09
Payer: COMMERCIAL

## 2021-06-09 VITALS
SYSTOLIC BLOOD PRESSURE: 115 MMHG | TEMPERATURE: 98.2 F | DIASTOLIC BLOOD PRESSURE: 78 MMHG | RESPIRATION RATE: 18 BRPM | HEART RATE: 105 BPM

## 2021-06-09 PROCEDURE — 74011000250 HC RX REV CODE- 250: Performed by: NURSE PRACTITIONER

## 2021-06-09 PROCEDURE — 74011250636 HC RX REV CODE- 250/636: Performed by: NURSE PRACTITIONER

## 2021-06-09 PROCEDURE — 96374 THER/PROPH/DIAG INJ IV PUSH: CPT

## 2021-06-09 RX ORDER — SODIUM CHLORIDE 0.9 % (FLUSH) 0.9 %
20 SYRINGE (ML) INJECTION AS NEEDED
Status: CANCELLED | OUTPATIENT
Start: 2021-06-10

## 2021-06-09 RX ORDER — SODIUM CHLORIDE 0.9 % (FLUSH) 0.9 %
20 SYRINGE (ML) INJECTION AS NEEDED
Status: DISCONTINUED | OUTPATIENT
Start: 2021-06-09 | End: 2021-06-11 | Stop reason: HOSPADM

## 2021-06-09 RX ADMIN — Medication 20 ML: at 17:00

## 2021-06-09 RX ADMIN — Medication 20 ML: at 16:55

## 2021-06-09 RX ADMIN — SODIUM CHLORIDE 500 MG: 9 INJECTION, SOLUTION INTRAMUSCULAR; INTRAVENOUS; SUBCUTANEOUS at 16:57

## 2021-06-09 NOTE — PROGRESS NOTES
Arrived to the [facility]. Daptomycin completed and tolerated well. Any issues or concerns during appointment: denies  Patient given education on antibiotic  Instructed patient to notify provider for any issues or worrisome symptoms. They verbalized understanding.    Patient aware of next infusion appointment scheduled for 6/10/21 at 5pm.  Discharged ambulatory - patient on crutches

## 2021-06-10 ENCOUNTER — HOSPITAL ENCOUNTER (OUTPATIENT)
Dept: INFUSION THERAPY | Age: 21
Discharge: HOME | End: 2021-06-10
Payer: COMMERCIAL

## 2021-06-10 VITALS
SYSTOLIC BLOOD PRESSURE: 105 MMHG | RESPIRATION RATE: 18 BRPM | HEART RATE: 109 BPM | OXYGEN SATURATION: 98 % | DIASTOLIC BLOOD PRESSURE: 73 MMHG | TEMPERATURE: 98.1 F

## 2021-06-10 PROCEDURE — 74011250636 HC RX REV CODE- 250/636: Performed by: NURSE PRACTITIONER

## 2021-06-10 PROCEDURE — 74011000250 HC RX REV CODE- 250: Performed by: NURSE PRACTITIONER

## 2021-06-10 PROCEDURE — 96374 THER/PROPH/DIAG INJ IV PUSH: CPT

## 2021-06-10 RX ORDER — SODIUM CHLORIDE 0.9 % (FLUSH) 0.9 %
10 SYRINGE (ML) INJECTION AS NEEDED
Status: DISCONTINUED | OUTPATIENT
Start: 2021-06-10 | End: 2021-06-12 | Stop reason: HOSPADM

## 2021-06-10 RX ADMIN — SODIUM CHLORIDE 500 MG: 9 INJECTION, SOLUTION INTRAMUSCULAR; INTRAVENOUS; SUBCUTANEOUS at 17:32

## 2021-06-10 RX ADMIN — Medication 10 ML: at 17:30

## 2021-06-10 RX ADMIN — Medication 10 ML: at 17:34

## 2021-06-10 NOTE — PROGRESS NOTES
Arrived to the [facility]. Daptomycin completed. Patient tolerated without problems. Any issues or concerns during appointment: no.  Patient aware of next infusion appointment on 6/11/21 (date) at [phone] (time). Discharged ambulatory on crutches with family.

## 2021-06-11 ENCOUNTER — HOSPITAL ENCOUNTER (OUTPATIENT)
Dept: INFUSION THERAPY | Age: 21
Discharge: HOME | End: 2021-06-11
Payer: COMMERCIAL

## 2021-06-11 VITALS
RESPIRATION RATE: 18 BRPM | TEMPERATURE: 98.3 F | HEART RATE: 103 BPM | OXYGEN SATURATION: 98 % | DIASTOLIC BLOOD PRESSURE: 51 MMHG | SYSTOLIC BLOOD PRESSURE: 119 MMHG

## 2021-06-11 LAB
BACTERIA SPEC CULT: NORMAL
SERVICE CMNT-IMP: NORMAL

## 2021-06-11 PROCEDURE — 74011000250 HC RX REV CODE- 250: Performed by: NURSE PRACTITIONER

## 2021-06-11 PROCEDURE — 74011250636 HC RX REV CODE- 250/636: Performed by: NURSE PRACTITIONER

## 2021-06-11 PROCEDURE — 96374 THER/PROPH/DIAG INJ IV PUSH: CPT

## 2021-06-11 RX ORDER — SODIUM CHLORIDE 0.9 % (FLUSH) 0.9 %
10 SYRINGE (ML) INJECTION AS NEEDED
Status: DISCONTINUED | OUTPATIENT
Start: 2021-06-11 | End: 2021-06-12 | Stop reason: HOSPADM

## 2021-06-11 RX ADMIN — SODIUM CHLORIDE 500 MG: 9 INJECTION, SOLUTION INTRAMUSCULAR; INTRAVENOUS; SUBCUTANEOUS at 17:51

## 2021-06-11 RX ADMIN — Medication 10 ML: at 17:51

## 2021-06-11 NOTE — PROGRESS NOTES
Arrived to the [facility]. Daptomycin completed. Patient tolerated well. Any issues or concerns during appointment: none. Patient aware of next infusion appointment on 6-12-21 (date) at 36 (time). Discharged via ambulatory with crutches.

## 2021-06-12 ENCOUNTER — HOSPITAL ENCOUNTER (OUTPATIENT)
Dept: INFUSION THERAPY | Age: 21
Discharge: HOME | End: 2021-06-12
Payer: COMMERCIAL

## 2021-06-12 VITALS
OXYGEN SATURATION: 97 % | RESPIRATION RATE: 18 BRPM | DIASTOLIC BLOOD PRESSURE: 54 MMHG | HEART RATE: 110 BPM | SYSTOLIC BLOOD PRESSURE: 108 MMHG | TEMPERATURE: 98.4 F

## 2021-06-12 PROCEDURE — 74011000250 HC RX REV CODE- 250: Performed by: NURSE PRACTITIONER

## 2021-06-12 PROCEDURE — 96374 THER/PROPH/DIAG INJ IV PUSH: CPT

## 2021-06-12 PROCEDURE — 74011250636 HC RX REV CODE- 250/636: Performed by: NURSE PRACTITIONER

## 2021-06-12 RX ORDER — SODIUM CHLORIDE 0.9 % (FLUSH) 0.9 %
10 SYRINGE (ML) INJECTION AS NEEDED
Status: ACTIVE | OUTPATIENT
Start: 2021-06-12 | End: 2021-06-12

## 2021-06-12 RX ORDER — SODIUM CHLORIDE 0.9 % (FLUSH) 0.9 %
10 SYRINGE (ML) INJECTION EVERY 8 HOURS
Status: CANCELLED | OUTPATIENT
Start: 2021-06-12

## 2021-06-12 RX ADMIN — Medication 10 ML: at 16:45

## 2021-06-12 RX ADMIN — SODIUM CHLORIDE 500 MG: 9 INJECTION, SOLUTION INTRAMUSCULAR; INTRAVENOUS; SUBCUTANEOUS at 16:46

## 2021-06-12 RX ADMIN — Medication 10 ML: at 16:50

## 2021-06-12 NOTE — PROGRESS NOTES
Pt arrived via wheelchair today at [phone], to receive IV daptomycin. Pt tolerated without difficulty. Patient discharged via wheelchair  accompanied by mother. Instructed to notify physician of any problems, questions or concerns. Allowed opportunity for patient/family to ask questions. Verbalized understanding. Next appointment is June 13 at 1330 with Gustavo Campoverde.

## 2021-06-13 ENCOUNTER — HOSPITAL ENCOUNTER (OUTPATIENT)
Dept: INFUSION THERAPY | Age: 21
Discharge: HOME | End: 2021-06-13
Payer: COMMERCIAL

## 2021-06-13 VITALS
HEART RATE: 100 BPM | OXYGEN SATURATION: 98 % | RESPIRATION RATE: 18 BRPM | SYSTOLIC BLOOD PRESSURE: 102 MMHG | DIASTOLIC BLOOD PRESSURE: 52 MMHG | TEMPERATURE: 98.2 F

## 2021-06-13 PROCEDURE — 74011000250 HC RX REV CODE- 250: Performed by: NURSE PRACTITIONER

## 2021-06-13 PROCEDURE — 74011250636 HC RX REV CODE- 250/636: Performed by: NURSE PRACTITIONER

## 2021-06-13 PROCEDURE — 96374 THER/PROPH/DIAG INJ IV PUSH: CPT

## 2021-06-13 RX ORDER — SODIUM CHLORIDE 0.9 % (FLUSH) 0.9 %
10 SYRINGE (ML) INJECTION EVERY 8 HOURS
Status: DISCONTINUED | OUTPATIENT
Start: 2021-06-13 | End: 2021-06-15 | Stop reason: HOSPADM

## 2021-06-13 RX ADMIN — SODIUM CHLORIDE 500 MG: 9 INJECTION, SOLUTION INTRAMUSCULAR; INTRAVENOUS; SUBCUTANEOUS at 13:38

## 2021-06-13 RX ADMIN — Medication 10 ML: at 13:37

## 2021-06-13 NOTE — PROGRESS NOTES
Arrived to the [facility]. Daptomycin completed. Patient tolerated well. Any issues or concerns during appointment: PICC line dressing changed early at pt's mothers request.  Patient aware of next infusion appointment on 6/14/21 at 1500. Discharged in [facility].

## 2021-06-14 ENCOUNTER — HOSPITAL ENCOUNTER (OUTPATIENT)
Dept: INFUSION THERAPY | Age: 21
Discharge: HOME | End: 2021-06-14
Payer: COMMERCIAL

## 2021-06-14 VITALS
SYSTOLIC BLOOD PRESSURE: 115 MMHG | HEART RATE: 120 BPM | RESPIRATION RATE: 16 BRPM | OXYGEN SATURATION: 98 % | TEMPERATURE: 97.9 F | DIASTOLIC BLOOD PRESSURE: 76 MMHG

## 2021-06-14 LAB
ALBUMIN SERPL-MCNC: 3.5 G/DL (ref 3.5–5)
ALBUMIN/GLOB SERPL: 0.9 {RATIO} (ref 1.2–3.5)
ALP SERPL-CCNC: 141 U/L (ref 50–136)
ALT SERPL-CCNC: 83 U/L (ref 12–65)
AST SERPL-CCNC: 28 U/L (ref 15–37)
BASOPHILS # BLD: 0.1 K/UL (ref 0–0.2)
BASOPHILS NFR BLD: 1 % (ref 0–2)
BILIRUB DIRECT SERPL-MCNC: <0.1 MG/DL
BILIRUB SERPL-MCNC: 0.3 MG/DL (ref 0.2–1.1)
CK SERPL-CCNC: 34 U/L (ref 21–215)
CREAT SERPL-MCNC: 0.7 MG/DL (ref 0.6–1)
CRP SERPL-MCNC: <0.3 MG/DL (ref 0–0.9)
DIFFERENTIAL METHOD BLD: NORMAL
EOSINOPHIL # BLD: 0.4 K/UL (ref 0–0.8)
EOSINOPHIL NFR BLD: 5 % (ref 0.5–7.8)
ERYTHROCYTE [DISTWIDTH] IN BLOOD BY AUTOMATED COUNT: 13.8 % (ref 11.9–14.6)
ERYTHROCYTE [SEDIMENTATION RATE] IN BLOOD: 11 MM/HR (ref 0–20)
GLOBULIN SER CALC-MCNC: 3.9 G/DL (ref 2.3–3.5)
HCT VFR BLD AUTO: 40.5 % (ref 35.8–46.3)
HGB BLD-MCNC: 13.2 G/DL (ref 11.7–15.4)
IMM GRANULOCYTES # BLD AUTO: 0 K/UL (ref 0–0.5)
IMM GRANULOCYTES NFR BLD AUTO: 0 % (ref 0–5)
LYMPHOCYTES # BLD: 2.1 K/UL (ref 0.5–4.6)
LYMPHOCYTES NFR BLD: 31 % (ref 13–44)
MCH RBC QN AUTO: 29.7 PG (ref 26.1–32.9)
MCHC RBC AUTO-ENTMCNC: 32.6 G/DL (ref 31.4–35)
MCV RBC AUTO: 91.2 FL (ref 79.6–97.8)
MONOCYTES # BLD: 0.4 K/UL (ref 0.1–1.3)
MONOCYTES NFR BLD: 6 % (ref 4–12)
NEUTS SEG # BLD: 4.1 K/UL (ref 1.7–8.2)
NEUTS SEG NFR BLD: 58 % (ref 43–78)
NRBC # BLD: 0 K/UL (ref 0–0.2)
PLATELET # BLD AUTO: 241 K/UL (ref 150–450)
PMV BLD AUTO: 10.9 FL (ref 9.4–12.3)
PROT SERPL-MCNC: 7.4 G/DL (ref 6.3–8.2)
RBC # BLD AUTO: 4.44 M/UL (ref 4.05–5.2)
WBC # BLD AUTO: 7 K/UL (ref 4.3–11.1)

## 2021-06-14 PROCEDURE — 74011000250 HC RX REV CODE- 250: Performed by: NURSE PRACTITIONER

## 2021-06-14 PROCEDURE — 96374 THER/PROPH/DIAG INJ IV PUSH: CPT

## 2021-06-14 PROCEDURE — 85025 COMPLETE CBC W/AUTO DIFF WBC: CPT

## 2021-06-14 PROCEDURE — 74011250636 HC RX REV CODE- 250/636: Performed by: NURSE PRACTITIONER

## 2021-06-14 PROCEDURE — 82550 ASSAY OF CK (CPK): CPT

## 2021-06-14 PROCEDURE — 80076 HEPATIC FUNCTION PANEL: CPT

## 2021-06-14 PROCEDURE — 85652 RBC SED RATE AUTOMATED: CPT

## 2021-06-14 PROCEDURE — 82565 ASSAY OF CREATININE: CPT

## 2021-06-14 PROCEDURE — 86140 C-REACTIVE PROTEIN: CPT

## 2021-06-14 RX ORDER — SODIUM CHLORIDE 0.9 % (FLUSH) 0.9 %
10 SYRINGE (ML) INJECTION EVERY 8 HOURS
Status: DISCONTINUED | OUTPATIENT
Start: 2021-06-14 | End: 2021-06-16 | Stop reason: HOSPADM

## 2021-06-14 RX ADMIN — Medication 10 ML: at 15:48

## 2021-06-14 RX ADMIN — SODIUM CHLORIDE 500 MG: 9 INJECTION, SOLUTION INTRAMUSCULAR; INTRAVENOUS; SUBCUTANEOUS at 15:44

## 2021-06-14 NOTE — PROGRESS NOTES
Arrived to the [facility]. Daptomycin completed. Provided education on Daoptomycin    Patient instructed to report any side affects to ordering provider Labs drawn today  Patient tolerated Daptomycin. Any issues or concerns during appointment: none. Patient aware of next infusion appointment on 6/15/21 (date) at 4 PM (time). Discharged ambulatory with crutches.

## 2021-06-15 ENCOUNTER — HOSPITAL ENCOUNTER (OUTPATIENT)
Dept: INFUSION THERAPY | Age: 21
Discharge: HOME | End: 2021-06-15
Payer: COMMERCIAL

## 2021-06-15 VITALS
OXYGEN SATURATION: 99 % | HEART RATE: 93 BPM | SYSTOLIC BLOOD PRESSURE: 99 MMHG | DIASTOLIC BLOOD PRESSURE: 51 MMHG | RESPIRATION RATE: 16 BRPM | TEMPERATURE: 97.5 F

## 2021-06-15 PROCEDURE — 96374 THER/PROPH/DIAG INJ IV PUSH: CPT

## 2021-06-15 PROCEDURE — 74011000250 HC RX REV CODE- 250: Performed by: NURSE PRACTITIONER

## 2021-06-15 PROCEDURE — 74011250636 HC RX REV CODE- 250/636: Performed by: NURSE PRACTITIONER

## 2021-06-15 RX ORDER — SODIUM CHLORIDE 0.9 % (FLUSH) 0.9 %
10 SYRINGE (ML) INJECTION AS NEEDED
Status: DISCONTINUED | OUTPATIENT
Start: 2021-06-15 | End: 2021-06-17 | Stop reason: HOSPADM

## 2021-06-15 RX ADMIN — Medication 10 ML: at 17:30

## 2021-06-15 RX ADMIN — Medication 10 ML: at 17:34

## 2021-06-15 RX ADMIN — SODIUM CHLORIDE 500 MG: 9 INJECTION, SOLUTION INTRAMUSCULAR; INTRAVENOUS; SUBCUTANEOUS at 17:32

## 2021-06-15 NOTE — PROGRESS NOTES
Arrived to the [facility]. Daptomycin completed. Provided education on fall prevention. Patient instructed to report any side affects to ordering provider. Patient tolerated well. Any issues or concerns during appointment: none. Patient aware of next infusion appointment on 6/16 at 5:00pm.  Discharged via crutches to homes.

## 2021-06-16 ENCOUNTER — HOSPITAL ENCOUNTER (OUTPATIENT)
Dept: INFUSION THERAPY | Age: 21
End: 2021-06-16

## 2021-06-16 ENCOUNTER — HOSPITAL ENCOUNTER (OUTPATIENT)
Dept: INFUSION THERAPY | Age: 21
Discharge: HOME | End: 2021-06-16
Payer: COMMERCIAL

## 2021-06-16 VITALS
HEART RATE: 106 BPM | SYSTOLIC BLOOD PRESSURE: 122 MMHG | DIASTOLIC BLOOD PRESSURE: 80 MMHG | TEMPERATURE: 98 F | RESPIRATION RATE: 20 BRPM | OXYGEN SATURATION: 95 %

## 2021-06-16 PROCEDURE — 74011000250 HC RX REV CODE- 250: Performed by: NURSE PRACTITIONER

## 2021-06-16 PROCEDURE — 74011250636 HC RX REV CODE- 250/636: Performed by: NURSE PRACTITIONER

## 2021-06-16 PROCEDURE — 96374 THER/PROPH/DIAG INJ IV PUSH: CPT

## 2021-06-16 RX ORDER — SODIUM CHLORIDE 0.9 % (FLUSH) 0.9 %
10 SYRINGE (ML) INJECTION EVERY 8 HOURS
Status: DISCONTINUED | OUTPATIENT
Start: 2021-06-16 | End: 2021-06-18 | Stop reason: HOSPADM

## 2021-06-16 RX ADMIN — Medication 10 ML: at 17:32

## 2021-06-16 RX ADMIN — SODIUM CHLORIDE 500 MG: 9 INJECTION, SOLUTION INTRAMUSCULAR; INTRAVENOUS; SUBCUTANEOUS at 17:26

## 2021-06-16 NOTE — PROGRESS NOTES
[facility] Daptomycin completed. Provided education on Daptomycin    Patient instructed to report any side affects to ordering provider. Patient tolerated Daptomycin. Any issues or concerns during appointment: none. Patient aware of next infusion appointment on 6/17/21 (date) at 4 PM (time). Discharged ambulatory with crutches.

## 2021-06-17 ENCOUNTER — HOSPITAL ENCOUNTER (OUTPATIENT)
Dept: INFUSION THERAPY | Age: 21
Discharge: HOME | End: 2021-06-17
Payer: COMMERCIAL

## 2021-06-17 VITALS
RESPIRATION RATE: 18 BRPM | HEART RATE: 104 BPM | TEMPERATURE: 97.8 F | DIASTOLIC BLOOD PRESSURE: 74 MMHG | SYSTOLIC BLOOD PRESSURE: 136 MMHG

## 2021-06-17 PROCEDURE — 74011000250 HC RX REV CODE- 250: Performed by: NURSE PRACTITIONER

## 2021-06-17 PROCEDURE — 96374 THER/PROPH/DIAG INJ IV PUSH: CPT

## 2021-06-17 PROCEDURE — 74011250636 HC RX REV CODE- 250/636: Performed by: NURSE PRACTITIONER

## 2021-06-17 RX ORDER — SODIUM CHLORIDE 0.9 % (FLUSH) 0.9 %
10-20 SYRINGE (ML) INJECTION AS NEEDED
Status: DISCONTINUED | OUTPATIENT
Start: 2021-06-17 | End: 2021-06-19 | Stop reason: HOSPADM

## 2021-06-17 RX ADMIN — Medication 10 ML: at 17:01

## 2021-06-17 RX ADMIN — SODIUM CHLORIDE 500 MG: 9 INJECTION, SOLUTION INTRAMUSCULAR; INTRAVENOUS; SUBCUTANEOUS at 17:02

## 2021-06-17 RX ADMIN — Medication 20 ML: at 17:05

## 2021-06-18 ENCOUNTER — HOSPITAL ENCOUNTER (OUTPATIENT)
Dept: INFUSION THERAPY | Age: 21
Discharge: HOME | End: 2021-06-18
Payer: COMMERCIAL

## 2021-06-18 VITALS
TEMPERATURE: 98.1 F | SYSTOLIC BLOOD PRESSURE: 110 MMHG | HEART RATE: 108 BPM | RESPIRATION RATE: 18 BRPM | DIASTOLIC BLOOD PRESSURE: 64 MMHG

## 2021-06-18 PROCEDURE — 74011000250 HC RX REV CODE- 250: Performed by: NURSE PRACTITIONER

## 2021-06-18 PROCEDURE — 96374 THER/PROPH/DIAG INJ IV PUSH: CPT

## 2021-06-18 PROCEDURE — 74011250636 HC RX REV CODE- 250/636: Performed by: NURSE PRACTITIONER

## 2021-06-18 RX ORDER — SODIUM CHLORIDE 0.9 % (FLUSH) 0.9 %
10 SYRINGE (ML) INJECTION AS NEEDED
Status: DISCONTINUED | OUTPATIENT
Start: 2021-06-18 | End: 2021-06-20 | Stop reason: HOSPADM

## 2021-06-18 RX ADMIN — Medication 10 ML: at 16:15

## 2021-06-18 RX ADMIN — Medication 10 ML: at 16:25

## 2021-06-18 RX ADMIN — SODIUM CHLORIDE 500 MG: 9 INJECTION, SOLUTION INTRAMUSCULAR; INTRAVENOUS; SUBCUTANEOUS at 16:22

## 2021-06-18 NOTE — PROGRESS NOTES
Arrived to the [facility]. Daptomycin injection completed. Provided education on injection. Patient instructed to report any side affects to ordering provider. Patient tolerated well. Any issues or concerns during appointment: none. Patient aware of next infusion appointment on 06/19/2021 at 1100. Discharged ambulatory.

## 2021-06-19 ENCOUNTER — HOSPITAL ENCOUNTER (OUTPATIENT)
Dept: INFUSION THERAPY | Age: 21
Discharge: HOME | End: 2021-06-19
Payer: COMMERCIAL

## 2021-06-19 VITALS
TEMPERATURE: 98.2 F | SYSTOLIC BLOOD PRESSURE: 108 MMHG | HEART RATE: 87 BPM | DIASTOLIC BLOOD PRESSURE: 65 MMHG | RESPIRATION RATE: 16 BRPM | OXYGEN SATURATION: 99 %

## 2021-06-19 PROCEDURE — 74011000250 HC RX REV CODE- 250: Performed by: NURSE PRACTITIONER

## 2021-06-19 PROCEDURE — 74011250636 HC RX REV CODE- 250/636: Performed by: NURSE PRACTITIONER

## 2021-06-19 PROCEDURE — 96374 THER/PROPH/DIAG INJ IV PUSH: CPT

## 2021-06-19 RX ORDER — SODIUM CHLORIDE 0.9 % (FLUSH) 0.9 %
10 SYRINGE (ML) INJECTION EVERY 8 HOURS
Status: DISCONTINUED | OUTPATIENT
Start: 2021-06-19 | End: 2021-06-21 | Stop reason: HOSPADM

## 2021-06-19 RX ADMIN — SODIUM CHLORIDE 500 MG: 9 INJECTION, SOLUTION INTRAMUSCULAR; INTRAVENOUS; SUBCUTANEOUS at 11:56

## 2021-06-19 RX ADMIN — Medication 10 ML: at 12:04

## 2021-06-19 NOTE — PROGRESS NOTES
Arrived to the [facility]. Daptomycin completed. Patient tolerated well. Any issues or concerns during appointment: none. Patient aware of next infusion appointment on 6-20-21 (date) at 36 (time). Discharged via w/c.

## 2021-06-20 ENCOUNTER — HOSPITAL ENCOUNTER (OUTPATIENT)
Dept: INFUSION THERAPY | Age: 21
Discharge: HOME | End: 2021-06-20
Payer: COMMERCIAL

## 2021-06-20 VITALS
RESPIRATION RATE: 18 BRPM | DIASTOLIC BLOOD PRESSURE: 61 MMHG | SYSTOLIC BLOOD PRESSURE: 105 MMHG | TEMPERATURE: 98.4 F | HEART RATE: 90 BPM

## 2021-06-20 PROCEDURE — 74011000250 HC RX REV CODE- 250: Performed by: NURSE PRACTITIONER

## 2021-06-20 PROCEDURE — 96374 THER/PROPH/DIAG INJ IV PUSH: CPT

## 2021-06-20 PROCEDURE — 74011250636 HC RX REV CODE- 250/636: Performed by: NURSE PRACTITIONER

## 2021-06-20 RX ORDER — SODIUM CHLORIDE 0.9 % (FLUSH) 0.9 %
10-20 SYRINGE (ML) INJECTION AS NEEDED
Status: DISCONTINUED | OUTPATIENT
Start: 2021-06-20 | End: 2021-06-22 | Stop reason: HOSPADM

## 2021-06-20 RX ADMIN — SODIUM CHLORIDE 500 MG: 9 INJECTION, SOLUTION INTRAMUSCULAR; INTRAVENOUS; SUBCUTANEOUS at 11:57

## 2021-06-20 RX ADMIN — Medication 20 ML: at 12:00

## 2021-06-20 NOTE — PROGRESS NOTES
Arrived to the [facility]. Daptomycin completed and tolerated well. PICC line dressing also changed today. Any issues or concerns during appointment: none  Patient given education on antibiotic therapy  Instructed patient to notify provider for any issues or worrisome symptoms. They verbalized understanding. Patient aware of next infusion appointment scheduled for 6/21/21 at 230pm  Discharged via w/c with mother.

## 2021-06-21 ENCOUNTER — HOSPITAL ENCOUNTER (OUTPATIENT)
Dept: INFUSION THERAPY | Age: 21
Discharge: HOME | End: 2021-06-21
Payer: COMMERCIAL

## 2021-06-21 LAB
ALBUMIN SERPL-MCNC: 3.4 G/DL (ref 3.5–5)
ALBUMIN/GLOB SERPL: 0.9 {RATIO} (ref 1.2–3.5)
ALP SERPL-CCNC: 111 U/L (ref 50–136)
ALT SERPL-CCNC: 36 U/L (ref 12–65)
AST SERPL-CCNC: 21 U/L (ref 15–37)
BASOPHILS # BLD: 0.1 K/UL (ref 0–0.2)
BASOPHILS NFR BLD: 1 % (ref 0–2)
BILIRUB DIRECT SERPL-MCNC: <0.1 MG/DL
BILIRUB SERPL-MCNC: 0.3 MG/DL (ref 0.2–1.1)
CK SERPL-CCNC: 41 U/L (ref 21–215)
CREAT SERPL-MCNC: 0.6 MG/DL (ref 0.6–1)
CRP SERPL-MCNC: <0.3 MG/DL (ref 0–0.9)
DIFFERENTIAL METHOD BLD: NORMAL
EOSINOPHIL # BLD: 0.5 K/UL (ref 0–0.8)
EOSINOPHIL NFR BLD: 7 % (ref 0.5–7.8)
ERYTHROCYTE [DISTWIDTH] IN BLOOD BY AUTOMATED COUNT: 13.8 % (ref 11.9–14.6)
ERYTHROCYTE [SEDIMENTATION RATE] IN BLOOD: 11 MM/HR (ref 0–20)
GLOBULIN SER CALC-MCNC: 3.9 G/DL (ref 2.3–3.5)
HCT VFR BLD AUTO: 38.6 % (ref 35.8–46.3)
HGB BLD-MCNC: 12.9 G/DL (ref 11.7–15.4)
IMM GRANULOCYTES # BLD AUTO: 0 K/UL (ref 0–0.5)
IMM GRANULOCYTES NFR BLD AUTO: 0 % (ref 0–5)
LYMPHOCYTES # BLD: 2.3 K/UL (ref 0.5–4.6)
LYMPHOCYTES NFR BLD: 32 % (ref 13–44)
MCH RBC QN AUTO: 30.1 PG (ref 26.1–32.9)
MCHC RBC AUTO-ENTMCNC: 33.4 G/DL (ref 31.4–35)
MCV RBC AUTO: 90.2 FL (ref 79.6–97.8)
MONOCYTES # BLD: 0.6 K/UL (ref 0.1–1.3)
MONOCYTES NFR BLD: 9 % (ref 4–12)
NEUTS SEG # BLD: 3.7 K/UL (ref 1.7–8.2)
NEUTS SEG NFR BLD: 51 % (ref 43–78)
NRBC # BLD: 0 K/UL (ref 0–0.2)
PLATELET # BLD AUTO: 247 K/UL (ref 150–450)
PMV BLD AUTO: 10.9 FL (ref 9.4–12.3)
PROT SERPL-MCNC: 7.3 G/DL (ref 6.3–8.2)
RBC # BLD AUTO: 4.28 M/UL (ref 4.05–5.2)
WBC # BLD AUTO: 7.1 K/UL (ref 4.3–11.1)

## 2021-06-21 PROCEDURE — 82550 ASSAY OF CK (CPK): CPT

## 2021-06-21 PROCEDURE — 74011000250 HC RX REV CODE- 250: Performed by: NURSE PRACTITIONER

## 2021-06-21 PROCEDURE — 82565 ASSAY OF CREATININE: CPT

## 2021-06-21 PROCEDURE — 80076 HEPATIC FUNCTION PANEL: CPT

## 2021-06-21 PROCEDURE — 74011250636 HC RX REV CODE- 250/636: Performed by: NURSE PRACTITIONER

## 2021-06-21 PROCEDURE — 85652 RBC SED RATE AUTOMATED: CPT

## 2021-06-21 PROCEDURE — 96374 THER/PROPH/DIAG INJ IV PUSH: CPT

## 2021-06-21 PROCEDURE — 86140 C-REACTIVE PROTEIN: CPT

## 2021-06-21 PROCEDURE — 85025 COMPLETE CBC W/AUTO DIFF WBC: CPT

## 2021-06-21 RX ORDER — SODIUM CHLORIDE 0.9 % (FLUSH) 0.9 %
10-20 SYRINGE (ML) INJECTION AS NEEDED
Status: DISCONTINUED | OUTPATIENT
Start: 2021-06-21 | End: 2021-06-23 | Stop reason: HOSPADM

## 2021-06-21 RX ADMIN — Medication 10 ML: at 15:04

## 2021-06-21 RX ADMIN — Medication 10 ML: at 15:13

## 2021-06-21 RX ADMIN — SODIUM CHLORIDE 500 MG: 9 INJECTION, SOLUTION INTRAMUSCULAR; INTRAVENOUS; SUBCUTANEOUS at 15:11

## 2021-06-21 NOTE — PROGRESS NOTES
Arrived to the [facility]. Labs and Dapto completed. Provided education on Dapto  Patient instructed to report any side affects to ordering provider. Patient tolerated well   Any issues or concerns during appointment: no.  Patient aware of next infusion appointment on 6/22 (date) at 1600 (time). Discharged ambulatory, no distress noted.

## 2021-06-22 ENCOUNTER — HOSPITAL ENCOUNTER (OUTPATIENT)
Dept: INFUSION THERAPY | Age: 21
Discharge: HOME | End: 2021-06-22
Payer: COMMERCIAL

## 2021-06-22 VITALS
TEMPERATURE: 98.1 F | SYSTOLIC BLOOD PRESSURE: 103 MMHG | RESPIRATION RATE: 18 BRPM | HEART RATE: 110 BPM | DIASTOLIC BLOOD PRESSURE: 64 MMHG | OXYGEN SATURATION: 97 %

## 2021-06-22 PROCEDURE — 74011250636 HC RX REV CODE- 250/636: Performed by: NURSE PRACTITIONER

## 2021-06-22 PROCEDURE — 74011000250 HC RX REV CODE- 250: Performed by: NURSE PRACTITIONER

## 2021-06-22 PROCEDURE — 96374 THER/PROPH/DIAG INJ IV PUSH: CPT

## 2021-06-22 RX ORDER — SODIUM CHLORIDE 0.9 % (FLUSH) 0.9 %
10 SYRINGE (ML) INJECTION AS NEEDED
Status: DISCONTINUED | OUTPATIENT
Start: 2021-06-22 | End: 2021-06-24 | Stop reason: HOSPADM

## 2021-06-22 RX ADMIN — Medication 10 ML: at 17:45

## 2021-06-22 RX ADMIN — SODIUM CHLORIDE 500 MG: 9 INJECTION, SOLUTION INTRAMUSCULAR; INTRAVENOUS; SUBCUTANEOUS at 17:42

## 2021-06-22 RX ADMIN — Medication 10 ML: at 17:36

## 2021-06-22 NOTE — PROGRESS NOTES
Arrived to the [facility]. Daptomycin  completed. Provided education on same    Patient instructed to report any side affects to ordering provider. Patient tolerated well. Any issues or concerns during appointment: none. Patient aware of next infusion appointment on 06/23/2021 (date) at 1500 (time). Discharged ambulatory on crutches with friend.

## 2021-06-23 ENCOUNTER — HOSPITAL ENCOUNTER (OUTPATIENT)
Dept: INFUSION THERAPY | Age: 21
Discharge: HOME | End: 2021-06-23
Payer: COMMERCIAL

## 2021-06-23 VITALS
TEMPERATURE: 98 F | DIASTOLIC BLOOD PRESSURE: 66 MMHG | HEART RATE: 98 BPM | RESPIRATION RATE: 18 BRPM | SYSTOLIC BLOOD PRESSURE: 112 MMHG

## 2021-06-23 PROCEDURE — 74011000250 HC RX REV CODE- 250: Performed by: NURSE PRACTITIONER

## 2021-06-23 PROCEDURE — 74011250636 HC RX REV CODE- 250/636: Performed by: NURSE PRACTITIONER

## 2021-06-23 PROCEDURE — 96374 THER/PROPH/DIAG INJ IV PUSH: CPT

## 2021-06-23 RX ORDER — SODIUM CHLORIDE 0.9 % (FLUSH) 0.9 %
10-20 SYRINGE (ML) INJECTION AS NEEDED
Status: DISCONTINUED | OUTPATIENT
Start: 2021-06-23 | End: 2021-06-25 | Stop reason: HOSPADM

## 2021-06-23 RX ADMIN — Medication 20 ML: at 15:27

## 2021-06-23 RX ADMIN — SODIUM CHLORIDE 500 MG: 9 INJECTION, SOLUTION INTRAMUSCULAR; INTRAVENOUS; SUBCUTANEOUS at 15:24

## 2021-06-23 RX ADMIN — Medication 10 ML: at 15:23

## 2021-06-23 NOTE — PROGRESS NOTES
Arrived to the [facility]. Daptomycin completed and tolerated well. Any issues or concerns during appointment: none  Patient given education on antibiotic therapy  Instructed patient to notify provider for any issues or worrisome symptoms. They verbalized understanding.    Patient aware of next infusion appointment scheduled for 6/24/21 at 3pm  Discharged ambulatory on crutches

## 2021-06-24 ENCOUNTER — HOSPITAL ENCOUNTER (OUTPATIENT)
Dept: INFUSION THERAPY | Age: 21
Discharge: HOME | End: 2021-06-24
Payer: COMMERCIAL

## 2021-06-24 VITALS
OXYGEN SATURATION: 98 % | TEMPERATURE: 97.6 F | HEART RATE: 107 BPM | RESPIRATION RATE: 16 BRPM | SYSTOLIC BLOOD PRESSURE: 129 MMHG | DIASTOLIC BLOOD PRESSURE: 76 MMHG

## 2021-06-24 PROCEDURE — 74011000250 HC RX REV CODE- 250: Performed by: NURSE PRACTITIONER

## 2021-06-24 PROCEDURE — 74011250636 HC RX REV CODE- 250/636: Performed by: NURSE PRACTITIONER

## 2021-06-24 PROCEDURE — 96374 THER/PROPH/DIAG INJ IV PUSH: CPT

## 2021-06-24 RX ORDER — SODIUM CHLORIDE 0.9 % (FLUSH) 0.9 %
10 SYRINGE (ML) INJECTION EVERY 8 HOURS
Status: DISCONTINUED | OUTPATIENT
Start: 2021-06-24 | End: 2021-06-26 | Stop reason: HOSPADM

## 2021-06-24 RX ADMIN — SODIUM CHLORIDE 500 MG: 9 INJECTION, SOLUTION INTRAMUSCULAR; INTRAVENOUS; SUBCUTANEOUS at 16:07

## 2021-06-24 RX ADMIN — Medication 10 ML: at 16:13

## 2021-06-25 ENCOUNTER — HOSPITAL ENCOUNTER (OUTPATIENT)
Dept: INFUSION THERAPY | Age: 21
Discharge: HOME | End: 2021-06-25
Payer: COMMERCIAL

## 2021-06-25 VITALS
HEART RATE: 104 BPM | TEMPERATURE: 97.9 F | RESPIRATION RATE: 18 BRPM | DIASTOLIC BLOOD PRESSURE: 65 MMHG | SYSTOLIC BLOOD PRESSURE: 108 MMHG | OXYGEN SATURATION: 98 %

## 2021-06-25 PROCEDURE — 74011000250 HC RX REV CODE- 250: Performed by: NURSE PRACTITIONER

## 2021-06-25 PROCEDURE — 96374 THER/PROPH/DIAG INJ IV PUSH: CPT

## 2021-06-25 PROCEDURE — 74011250636 HC RX REV CODE- 250/636: Performed by: NURSE PRACTITIONER

## 2021-06-25 RX ORDER — SODIUM CHLORIDE 0.9 % (FLUSH) 0.9 %
5-10 SYRINGE (ML) INJECTION EVERY 8 HOURS
Status: DISCONTINUED | OUTPATIENT
Start: 2021-06-25 | End: 2021-06-27 | Stop reason: HOSPADM

## 2021-06-25 RX ADMIN — Medication 10 ML: at 17:49

## 2021-06-25 RX ADMIN — SODIUM CHLORIDE 500 MG: 9 INJECTION, SOLUTION INTRAMUSCULAR; INTRAVENOUS; SUBCUTANEOUS at 17:49

## 2021-06-25 NOTE — PROGRESS NOTES
Arrived to the infusion center. Daptomycin given without adverse reaction. No new issues or concerns voiced. Aware of her next appointment on 6/25 at . Discharged in satisfactory condition.

## 2021-06-25 NOTE — PROGRESS NOTES
Arrived to the [facility]. Daptomycin completed. Patient tolerated well. Any issues or concerns during appointment: none. Patient aware of next infusion appointment on 6/26/2021 at 11:30am.  Discharged ambulatory with crutches.

## 2021-06-26 ENCOUNTER — HOSPITAL ENCOUNTER (OUTPATIENT)
Dept: INFUSION THERAPY | Age: 21
Discharge: HOME | End: 2021-06-26
Payer: COMMERCIAL

## 2021-06-26 VITALS
OXYGEN SATURATION: 98 % | DIASTOLIC BLOOD PRESSURE: 58 MMHG | RESPIRATION RATE: 18 BRPM | TEMPERATURE: 98.4 F | HEART RATE: 95 BPM | SYSTOLIC BLOOD PRESSURE: 92 MMHG

## 2021-06-26 PROCEDURE — 74011000250 HC RX REV CODE- 250: Performed by: NURSE PRACTITIONER

## 2021-06-26 PROCEDURE — 96374 THER/PROPH/DIAG INJ IV PUSH: CPT

## 2021-06-26 PROCEDURE — 74011250636 HC RX REV CODE- 250/636: Performed by: NURSE PRACTITIONER

## 2021-06-26 RX ORDER — SODIUM CHLORIDE 0.9 % (FLUSH) 0.9 %
10-40 SYRINGE (ML) INJECTION AS NEEDED
Status: DISCONTINUED | OUTPATIENT
Start: 2021-06-26 | End: 2021-06-28 | Stop reason: HOSPADM

## 2021-06-26 RX ADMIN — Medication 10 ML: at 11:51

## 2021-06-26 RX ADMIN — SODIUM CHLORIDE 500 MG: 9 INJECTION, SOLUTION INTRAMUSCULAR; INTRAVENOUS; SUBCUTANEOUS at 11:52

## 2021-06-26 RX ADMIN — Medication 10 ML: at 11:55

## 2021-06-26 NOTE — PROGRESS NOTES
[facility] Assessment completed. Provided education on Daptomycin. Patient instructed to report any side affects to ordering provider. Patient tolerated Daptomycin well. Any issues or concerns during appointment: none. Patient aware of next infusion appointment on 6/27/21 (date) at [phone] (time) with IV infusion center. Discharged ambulatory, with mother. Patient instructed to call her doctor's office immediately for any problems or concerns. She verbalizes understanding.

## 2021-06-27 ENCOUNTER — HOSPITAL ENCOUNTER (OUTPATIENT)
Dept: INFUSION THERAPY | Age: 21
Discharge: HOME | End: 2021-06-27

## 2021-06-27 VITALS
OXYGEN SATURATION: 100 % | HEART RATE: 84 BPM | RESPIRATION RATE: 18 BRPM | SYSTOLIC BLOOD PRESSURE: 103 MMHG | TEMPERATURE: 98.4 F | DIASTOLIC BLOOD PRESSURE: 63 MMHG

## 2021-06-27 PROCEDURE — 96374 THER/PROPH/DIAG INJ IV PUSH: CPT

## 2021-06-27 PROCEDURE — 74011250636 HC RX REV CODE- 250/636: Performed by: NURSE PRACTITIONER

## 2021-06-27 PROCEDURE — 74011000250 HC RX REV CODE- 250: Performed by: NURSE PRACTITIONER

## 2021-06-27 RX ORDER — SODIUM CHLORIDE 0.9 % (FLUSH) 0.9 %
10-20 SYRINGE (ML) INJECTION EVERY 8 HOURS
Status: DISCONTINUED | OUTPATIENT
Start: 2021-06-27 | End: 2021-06-29 | Stop reason: HOSPADM

## 2021-06-27 RX ADMIN — Medication 20 ML: at 11:44

## 2021-06-27 RX ADMIN — SODIUM CHLORIDE 500 MG: 9 INJECTION, SOLUTION INTRAMUSCULAR; INTRAVENOUS; SUBCUTANEOUS at 11:39

## 2021-06-27 NOTE — PROGRESS NOTES
Pt arrived via w/c. Dapto given and PICC dressing and caps changed. Pt tolerated well. Pt aware of tomorrow's appt at 1500. Discharged via w/c. No distress noted.

## 2021-06-28 ENCOUNTER — HOSPITAL ENCOUNTER (OUTPATIENT)
Dept: INFUSION THERAPY | Age: 21
Discharge: HOME | End: 2021-06-28

## 2021-06-28 VITALS
OXYGEN SATURATION: 99 % | RESPIRATION RATE: 18 BRPM | SYSTOLIC BLOOD PRESSURE: 111 MMHG | TEMPERATURE: 98.1 F | DIASTOLIC BLOOD PRESSURE: 68 MMHG | HEART RATE: 102 BPM

## 2021-06-28 LAB
ALBUMIN SERPL-MCNC: 3.5 G/DL (ref 3.5–5)
ALBUMIN/GLOB SERPL: 0.9 {RATIO} (ref 1.2–3.5)
ALP SERPL-CCNC: 100 U/L (ref 50–136)
ALT SERPL-CCNC: 33 U/L (ref 12–65)
AST SERPL-CCNC: 16 U/L (ref 15–37)
BASOPHILS # BLD: 0 K/UL (ref 0–0.2)
BASOPHILS NFR BLD: 1 % (ref 0–2)
BILIRUB DIRECT SERPL-MCNC: 0.1 MG/DL
BILIRUB SERPL-MCNC: 0.3 MG/DL (ref 0.2–1.1)
CK SERPL-CCNC: 41 U/L (ref 21–215)
CREAT SERPL-MCNC: 0.8 MG/DL (ref 0.6–1)
CRP SERPL-MCNC: <0.3 MG/DL (ref 0–0.9)
DIFFERENTIAL METHOD BLD: NORMAL
EOSINOPHIL # BLD: 0.5 K/UL (ref 0–0.8)
EOSINOPHIL NFR BLD: 6 % (ref 0.5–7.8)
ERYTHROCYTE [DISTWIDTH] IN BLOOD BY AUTOMATED COUNT: 13.7 % (ref 11.9–14.6)
ERYTHROCYTE [SEDIMENTATION RATE] IN BLOOD: 11 MM/HR (ref 0–20)
GLOBULIN SER CALC-MCNC: 3.8 G/DL (ref 2.3–3.5)
HCT VFR BLD AUTO: 38.4 % (ref 35.8–46.3)
HGB BLD-MCNC: 12.4 G/DL (ref 11.7–15.4)
IMM GRANULOCYTES # BLD AUTO: 0 K/UL (ref 0–0.5)
IMM GRANULOCYTES NFR BLD AUTO: 0 % (ref 0–5)
LYMPHOCYTES # BLD: 2.1 K/UL (ref 0.5–4.6)
LYMPHOCYTES NFR BLD: 29 % (ref 13–44)
MCH RBC QN AUTO: 29.2 PG (ref 26.1–32.9)
MCHC RBC AUTO-ENTMCNC: 32.3 G/DL (ref 31.4–35)
MCV RBC AUTO: 90.6 FL (ref 79.6–97.8)
MONOCYTES # BLD: 0.6 K/UL (ref 0.1–1.3)
MONOCYTES NFR BLD: 8 % (ref 4–12)
NEUTS SEG # BLD: 4 K/UL (ref 1.7–8.2)
NEUTS SEG NFR BLD: 56 % (ref 43–78)
NRBC # BLD: 0 K/UL (ref 0–0.2)
PLATELET # BLD AUTO: 215 K/UL (ref 150–450)
PMV BLD AUTO: 10.7 FL (ref 9.4–12.3)
PROT SERPL-MCNC: 7.3 G/DL (ref 6.3–8.2)
RBC # BLD AUTO: 4.24 M/UL (ref 4.05–5.2)
WBC # BLD AUTO: 7.2 K/UL (ref 4.3–11.1)

## 2021-06-28 PROCEDURE — 80076 HEPATIC FUNCTION PANEL: CPT

## 2021-06-28 PROCEDURE — 74011250636 HC RX REV CODE- 250/636: Performed by: NURSE PRACTITIONER

## 2021-06-28 PROCEDURE — 74011000250 HC RX REV CODE- 250: Performed by: NURSE PRACTITIONER

## 2021-06-28 PROCEDURE — 96374 THER/PROPH/DIAG INJ IV PUSH: CPT

## 2021-06-28 PROCEDURE — 82550 ASSAY OF CK (CPK): CPT

## 2021-06-28 PROCEDURE — 86140 C-REACTIVE PROTEIN: CPT

## 2021-06-28 PROCEDURE — 85652 RBC SED RATE AUTOMATED: CPT

## 2021-06-28 PROCEDURE — 85025 COMPLETE CBC W/AUTO DIFF WBC: CPT

## 2021-06-28 PROCEDURE — 82565 ASSAY OF CREATININE: CPT

## 2021-06-28 RX ORDER — SODIUM CHLORIDE 0.9 % (FLUSH) 0.9 %
10 SYRINGE (ML) INJECTION EVERY 8 HOURS
Status: DISCONTINUED | OUTPATIENT
Start: 2021-06-28 | End: 2021-06-30 | Stop reason: HOSPADM

## 2021-06-28 RX ADMIN — SODIUM CHLORIDE 500 MG: 9 INJECTION, SOLUTION INTRAMUSCULAR; INTRAVENOUS; SUBCUTANEOUS at 15:28

## 2021-06-28 RX ADMIN — Medication 10 ML: at 15:31

## 2021-06-28 NOTE — PROGRESS NOTES
Arrived to the Infusion Center.  Daptomycin completed and tolerated well. Labs drawn today. Any issues or concerns during appointment: none  Patient given education on antibiotic therapy  Instructed patient to notify provider for any issues or worrisome symptoms. They verbalized understanding.    Patient aware of next infusion appointment scheduled for 6/29/21 at 330pm  Discharged via w/c with family

## 2021-06-29 ENCOUNTER — HOSPITAL ENCOUNTER (OUTPATIENT)
Dept: INFUSION THERAPY | Age: 21
Discharge: HOME | End: 2021-06-29

## 2021-06-29 VITALS
HEART RATE: 96 BPM | DIASTOLIC BLOOD PRESSURE: 72 MMHG | SYSTOLIC BLOOD PRESSURE: 116 MMHG | RESPIRATION RATE: 18 BRPM | OXYGEN SATURATION: 98 % | TEMPERATURE: 98.3 F

## 2021-06-29 PROCEDURE — 74011250636 HC RX REV CODE- 250/636: Performed by: NURSE PRACTITIONER

## 2021-06-29 PROCEDURE — 74011000250 HC RX REV CODE- 250: Performed by: NURSE PRACTITIONER

## 2021-06-29 PROCEDURE — 96374 THER/PROPH/DIAG INJ IV PUSH: CPT

## 2021-06-29 RX ORDER — SODIUM CHLORIDE 0.9 % (FLUSH) 0.9 %
10 SYRINGE (ML) INJECTION AS NEEDED
Status: DISCONTINUED | OUTPATIENT
Start: 2021-06-29 | End: 2021-07-01 | Stop reason: HOSPADM

## 2021-06-29 RX ADMIN — Medication 10 ML: at 16:17

## 2021-06-29 RX ADMIN — Medication 10 ML: at 16:13

## 2021-06-29 RX ADMIN — SODIUM CHLORIDE 500 MG: 9 INJECTION, SOLUTION INTRAMUSCULAR; INTRAVENOUS; SUBCUTANEOUS at 16:14

## 2021-06-29 NOTE — PROGRESS NOTES
Arrived to the [facility]. Daptomycin completed. Patient tolerated well. Any issues or concerns during appointment: none. Discharged ambulatory with crutches.     Jed Sheppard RN

## 2021-06-30 ENCOUNTER — HOSPITAL ENCOUNTER (OUTPATIENT)
Dept: INFUSION THERAPY | Age: 21
Discharge: HOME | End: 2021-06-30

## 2021-06-30 VITALS
DIASTOLIC BLOOD PRESSURE: 71 MMHG | RESPIRATION RATE: 18 BRPM | SYSTOLIC BLOOD PRESSURE: 117 MMHG | TEMPERATURE: 98.2 F | OXYGEN SATURATION: 98 % | HEART RATE: 94 BPM

## 2021-06-30 PROCEDURE — 74011000250 HC RX REV CODE- 250: Performed by: NURSE PRACTITIONER

## 2021-06-30 PROCEDURE — 96374 THER/PROPH/DIAG INJ IV PUSH: CPT

## 2021-06-30 PROCEDURE — 74011250636 HC RX REV CODE- 250/636: Performed by: NURSE PRACTITIONER

## 2021-06-30 RX ORDER — SODIUM CHLORIDE 0.9 % (FLUSH) 0.9 %
10 SYRINGE (ML) INJECTION AS NEEDED
Status: DISCONTINUED | OUTPATIENT
Start: 2021-06-30 | End: 2021-07-02 | Stop reason: HOSPADM

## 2021-06-30 RX ADMIN — SODIUM CHLORIDE 500 MG: 9 INJECTION, SOLUTION INTRAMUSCULAR; INTRAVENOUS; SUBCUTANEOUS at 14:18

## 2021-06-30 RX ADMIN — Medication 10 ML: at 14:24

## 2021-06-30 NOTE — PROGRESS NOTES
[facility]. Dapto completed. Provided education on Dapto    Patient instructed to report any side affects to ordering provider. Patient tolerated well. Any issues or concerns during appointment: PICC line dressing changed. Patient aware of next infusion appointment on 7/1/21 (date) at 1330 (time). Discharged ambulatory with crutches.

## 2021-07-01 ENCOUNTER — HOSPITAL ENCOUNTER (OUTPATIENT)
Dept: INFUSION THERAPY | Age: 21
Discharge: HOME | End: 2021-07-01

## 2021-07-01 VITALS
HEART RATE: 100 BPM | RESPIRATION RATE: 18 BRPM | SYSTOLIC BLOOD PRESSURE: 103 MMHG | DIASTOLIC BLOOD PRESSURE: 65 MMHG | TEMPERATURE: 98.3 F

## 2021-07-01 PROCEDURE — 74011250636 HC RX REV CODE- 250/636: Performed by: NURSE PRACTITIONER

## 2021-07-01 PROCEDURE — 96374 THER/PROPH/DIAG INJ IV PUSH: CPT

## 2021-07-01 PROCEDURE — 74011000250 HC RX REV CODE- 250: Performed by: NURSE PRACTITIONER

## 2021-07-01 RX ORDER — SODIUM CHLORIDE 0.9 % (FLUSH) 0.9 %
10 SYRINGE (ML) INJECTION AS NEEDED
Status: DISCONTINUED | OUTPATIENT
Start: 2021-07-01 | End: 2021-07-03 | Stop reason: HOSPADM

## 2021-07-01 RX ADMIN — SODIUM CHLORIDE 500 MG: 9 INJECTION, SOLUTION INTRAMUSCULAR; INTRAVENOUS; SUBCUTANEOUS at 14:07

## 2021-07-01 RX ADMIN — Medication 10 ML: at 14:05

## 2021-07-01 RX ADMIN — Medication 10 ML: at 14:09

## 2021-07-01 NOTE — PROGRESS NOTES
Arrived to the [facility]. Daptomycin completed. Provided education on same    Patient instructed to report any side affects to ordering provider. Patient tolerated well. Any issues or concerns during appointment: none. Patient aware of next infusion appointment on 07/02/2021 (date) at [phone] (time). Discharged in wheelchair with family.

## 2021-07-02 ENCOUNTER — HOSPITAL ENCOUNTER (OUTPATIENT)
Dept: INFUSION THERAPY | Age: 21
Discharge: HOME | End: 2021-07-02

## 2021-07-02 VITALS
RESPIRATION RATE: 18 BRPM | SYSTOLIC BLOOD PRESSURE: 97 MMHG | TEMPERATURE: 97.8 F | OXYGEN SATURATION: 98 % | HEART RATE: 91 BPM | DIASTOLIC BLOOD PRESSURE: 51 MMHG

## 2021-07-02 PROCEDURE — 96374 THER/PROPH/DIAG INJ IV PUSH: CPT

## 2021-07-02 PROCEDURE — 74011000250 HC RX REV CODE- 250: Performed by: NURSE PRACTITIONER

## 2021-07-02 PROCEDURE — 74011250636 HC RX REV CODE- 250/636: Performed by: NURSE PRACTITIONER

## 2021-07-02 RX ORDER — SODIUM CHLORIDE 0.9 % (FLUSH) 0.9 %
10 SYRINGE (ML) INJECTION AS NEEDED
Status: DISCONTINUED | OUTPATIENT
Start: 2021-07-02 | End: 2021-07-04 | Stop reason: HOSPADM

## 2021-07-02 RX ADMIN — Medication 10 ML: at 14:35

## 2021-07-02 RX ADMIN — SODIUM CHLORIDE 500 MG: 9 INJECTION, SOLUTION INTRAMUSCULAR; INTRAVENOUS; SUBCUTANEOUS at 15:04

## 2021-07-02 RX ADMIN — Medication 10 ML: at 15:06

## 2021-07-02 NOTE — PROGRESS NOTES
Arrived to the [facility]. PICC dressing change and Daptomycin completed. Patient tolerated well. Any issues or concerns during appointment: no.  Patient aware of next infusion appointment on 7/3/2021 (date) at 1:30 pm (time). Discharged ambulatory with family member.

## 2021-07-03 ENCOUNTER — HOSPITAL ENCOUNTER (OUTPATIENT)
Dept: INFUSION THERAPY | Age: 21
Discharge: HOME | End: 2021-07-03

## 2021-07-03 VITALS
HEART RATE: 86 BPM | SYSTOLIC BLOOD PRESSURE: 95 MMHG | OXYGEN SATURATION: 97 % | DIASTOLIC BLOOD PRESSURE: 63 MMHG | TEMPERATURE: 98.5 F | RESPIRATION RATE: 16 BRPM

## 2021-07-03 PROCEDURE — 74011250636 HC RX REV CODE- 250/636: Performed by: NURSE PRACTITIONER

## 2021-07-03 PROCEDURE — 96374 THER/PROPH/DIAG INJ IV PUSH: CPT

## 2021-07-03 PROCEDURE — 74011000250 HC RX REV CODE- 250: Performed by: NURSE PRACTITIONER

## 2021-07-03 RX ORDER — SODIUM CHLORIDE 0.9 % (FLUSH) 0.9 %
10 SYRINGE (ML) INJECTION AS NEEDED
Status: DISCONTINUED | OUTPATIENT
Start: 2021-07-03 | End: 2021-07-05 | Stop reason: HOSPADM

## 2021-07-03 RX ADMIN — Medication 10 ML: at 13:40

## 2021-07-03 RX ADMIN — SODIUM CHLORIDE 500 MG: 9 INJECTION, SOLUTION INTRAMUSCULAR; INTRAVENOUS; SUBCUTANEOUS at 13:52

## 2021-07-03 RX ADMIN — Medication 10 ML: at 13:54

## 2021-07-03 NOTE — PROGRESS NOTES
Arrived to the [facility]. Daptomycin completed. Patient tolerated well. Any issues or concerns during appointment: no.  Patient aware of next infusion appointment on 7/4/2021 (date) at 1 pm (time). Discharged via wheelchair with family member.

## 2021-07-04 ENCOUNTER — HOSPITAL ENCOUNTER (OUTPATIENT)
Dept: INFUSION THERAPY | Age: 21
Discharge: HOME | End: 2021-07-04

## 2021-07-04 VITALS
HEART RATE: 98 BPM | OXYGEN SATURATION: 97 % | TEMPERATURE: 98.4 F | SYSTOLIC BLOOD PRESSURE: 104 MMHG | RESPIRATION RATE: 18 BRPM | DIASTOLIC BLOOD PRESSURE: 66 MMHG

## 2021-07-04 PROCEDURE — 74011250636 HC RX REV CODE- 250/636: Performed by: NURSE PRACTITIONER

## 2021-07-04 PROCEDURE — 96374 THER/PROPH/DIAG INJ IV PUSH: CPT

## 2021-07-04 PROCEDURE — 74011000250 HC RX REV CODE- 250: Performed by: NURSE PRACTITIONER

## 2021-07-04 RX ORDER — SODIUM CHLORIDE 0.9 % (FLUSH) 0.9 %
10 SYRINGE (ML) INJECTION AS NEEDED
Status: DISCONTINUED | OUTPATIENT
Start: 2021-07-04 | End: 2021-07-06 | Stop reason: HOSPADM

## 2021-07-04 RX ADMIN — Medication 10 ML: at 13:20

## 2021-07-04 RX ADMIN — Medication 10 ML: at 13:29

## 2021-07-04 RX ADMIN — SODIUM CHLORIDE 500 MG: 9 INJECTION, SOLUTION INTRAMUSCULAR; INTRAVENOUS; SUBCUTANEOUS at 13:27

## 2021-07-04 NOTE — PROGRESS NOTES
Arrived to the [facility]. Daptomycin completed. Provided education on s/sx of infection    Patient instructed to report any side affects to ordering provider. Patient tolerated without problems. Any issues or concerns during appointment: no.  Patient aware of next infusion appointment on 7/5/21 (date) at 1400 (time). Discharged via [facility] with family.

## 2021-07-05 ENCOUNTER — HOSPITAL ENCOUNTER (OUTPATIENT)
Dept: INFUSION THERAPY | Age: 21
Discharge: HOME | End: 2021-07-05

## 2021-07-05 VITALS
DIASTOLIC BLOOD PRESSURE: 69 MMHG | TEMPERATURE: 98.4 F | HEART RATE: 86 BPM | OXYGEN SATURATION: 98 % | SYSTOLIC BLOOD PRESSURE: 103 MMHG | RESPIRATION RATE: 18 BRPM

## 2021-07-05 LAB
ALBUMIN SERPL-MCNC: 3.6 G/DL (ref 3.5–5)
ALBUMIN/GLOB SERPL: 1 {RATIO} (ref 1.2–3.5)
ALP SERPL-CCNC: 97 U/L (ref 50–136)
ALT SERPL-CCNC: 31 U/L (ref 12–65)
AST SERPL-CCNC: 19 U/L (ref 15–37)
BASOPHILS # BLD: 0.1 K/UL (ref 0–0.2)
BASOPHILS NFR BLD: 1 % (ref 0–2)
BILIRUB DIRECT SERPL-MCNC: <0.1 MG/DL
BILIRUB SERPL-MCNC: 0.3 MG/DL (ref 0.2–1.1)
CK SERPL-CCNC: 39 U/L (ref 21–215)
CREAT SERPL-MCNC: 0.5 MG/DL (ref 0.6–1)
CRP SERPL-MCNC: <0.3 MG/DL (ref 0–0.9)
DIFFERENTIAL METHOD BLD: NORMAL
EOSINOPHIL # BLD: 0.4 K/UL (ref 0–0.8)
EOSINOPHIL NFR BLD: 5 % (ref 0.5–7.8)
ERYTHROCYTE [DISTWIDTH] IN BLOOD BY AUTOMATED COUNT: 13.2 % (ref 11.9–14.6)
ERYTHROCYTE [SEDIMENTATION RATE] IN BLOOD: 8 MM/HR (ref 0–20)
GLOBULIN SER CALC-MCNC: 3.6 G/DL (ref 2.3–3.5)
HCT VFR BLD AUTO: 40 % (ref 35.8–46.3)
HGB BLD-MCNC: 12.9 G/DL (ref 11.7–15.4)
IMM GRANULOCYTES # BLD AUTO: 0 K/UL (ref 0–0.5)
IMM GRANULOCYTES NFR BLD AUTO: 0 % (ref 0–5)
LYMPHOCYTES # BLD: 2.1 K/UL (ref 0.5–4.6)
LYMPHOCYTES NFR BLD: 28 % (ref 13–44)
MCH RBC QN AUTO: 29.2 PG (ref 26.1–32.9)
MCHC RBC AUTO-ENTMCNC: 32.3 G/DL (ref 31.4–35)
MCV RBC AUTO: 90.5 FL (ref 79.6–97.8)
MONOCYTES # BLD: 0.5 K/UL (ref 0.1–1.3)
MONOCYTES NFR BLD: 7 % (ref 4–12)
NEUTS SEG # BLD: 4.6 K/UL (ref 1.7–8.2)
NEUTS SEG NFR BLD: 60 % (ref 43–78)
NRBC # BLD: 0 K/UL (ref 0–0.2)
PLATELET # BLD AUTO: 219 K/UL (ref 150–450)
PMV BLD AUTO: 11 FL (ref 9.4–12.3)
PROT SERPL-MCNC: 7.2 G/DL (ref 6.3–8.2)
RBC # BLD AUTO: 4.42 M/UL (ref 4.05–5.2)
WBC # BLD AUTO: 7.6 K/UL (ref 4.3–11.1)

## 2021-07-05 PROCEDURE — 96374 THER/PROPH/DIAG INJ IV PUSH: CPT

## 2021-07-05 PROCEDURE — 82550 ASSAY OF CK (CPK): CPT

## 2021-07-05 PROCEDURE — 82565 ASSAY OF CREATININE: CPT

## 2021-07-05 PROCEDURE — 74011000250 HC RX REV CODE- 250: Performed by: NURSE PRACTITIONER

## 2021-07-05 PROCEDURE — 74011250636 HC RX REV CODE- 250/636: Performed by: NURSE PRACTITIONER

## 2021-07-05 PROCEDURE — 85652 RBC SED RATE AUTOMATED: CPT

## 2021-07-05 PROCEDURE — 86140 C-REACTIVE PROTEIN: CPT

## 2021-07-05 PROCEDURE — 85025 COMPLETE CBC W/AUTO DIFF WBC: CPT

## 2021-07-05 PROCEDURE — 80076 HEPATIC FUNCTION PANEL: CPT

## 2021-07-05 RX ORDER — SODIUM CHLORIDE 0.9 % (FLUSH) 0.9 %
10 SYRINGE (ML) INJECTION AS NEEDED
Status: DISCONTINUED | OUTPATIENT
Start: 2021-07-05 | End: 2021-07-07 | Stop reason: HOSPADM

## 2021-07-05 RX ADMIN — SODIUM CHLORIDE 500 MG: 9 INJECTION, SOLUTION INTRAMUSCULAR; INTRAVENOUS; SUBCUTANEOUS at 14:23

## 2021-07-05 RX ADMIN — Medication 10 ML: at 14:25

## 2021-07-05 RX ADMIN — Medication 10 ML: at 14:23

## 2021-07-05 NOTE — PROGRESS NOTES
Ar[facility] to the [facility]. Daptomycin completed. Provided education on s/sx of infection    Patient instructed to report any side affects to ordering provider. Patient tolerated without problems. Any issues or concerns during appointment: no.  Patient aware of next infusion appointment on 7/6/21 (date) at 1500 (time). Discharged ambulatory with .

## 2021-07-06 ENCOUNTER — HOSPITAL ENCOUNTER (OUTPATIENT)
Dept: INFUSION THERAPY | Age: 21
Discharge: HOME | End: 2021-07-06

## 2021-07-06 VITALS
RESPIRATION RATE: 17 BRPM | OXYGEN SATURATION: 98 % | TEMPERATURE: 98.3 F | SYSTOLIC BLOOD PRESSURE: 95 MMHG | HEART RATE: 95 BPM | DIASTOLIC BLOOD PRESSURE: 58 MMHG

## 2021-07-06 PROCEDURE — 96374 THER/PROPH/DIAG INJ IV PUSH: CPT

## 2021-07-06 PROCEDURE — 74011000250 HC RX REV CODE- 250: Performed by: NURSE PRACTITIONER

## 2021-07-06 PROCEDURE — 74011250636 HC RX REV CODE- 250/636: Performed by: NURSE PRACTITIONER

## 2021-07-06 RX ORDER — SODIUM CHLORIDE 0.9 % (FLUSH) 0.9 %
10 SYRINGE (ML) INJECTION AS NEEDED
Status: DISCONTINUED | OUTPATIENT
Start: 2021-07-06 | End: 2021-07-08 | Stop reason: HOSPADM

## 2021-07-06 RX ADMIN — Medication 10 ML: at 15:37

## 2021-07-06 RX ADMIN — SODIUM CHLORIDE 500 MG: 9 INJECTION, SOLUTION INTRAMUSCULAR; INTRAVENOUS; SUBCUTANEOUS at 15:38

## 2021-07-06 RX ADMIN — Medication 10 ML: at 15:41

## 2021-07-06 NOTE — PROGRESS NOTES
Arrived to the [facility]. Dapto infusion completed. Patient instructed to report any side affects to ordering provider. Patient tolerated well. Any issues or concerns during appointment: none. Patient aware of next infusion appointment on 07/07/2021 (date) at 1330 (time). Discharged ambulatory with crutch.

## 2021-07-07 ENCOUNTER — HOSPITAL ENCOUNTER (OUTPATIENT)
Dept: INFUSION THERAPY | Age: 21
Discharge: HOME | End: 2021-07-07

## 2021-07-07 VITALS
RESPIRATION RATE: 18 BRPM | DIASTOLIC BLOOD PRESSURE: 78 MMHG | TEMPERATURE: 97.9 F | SYSTOLIC BLOOD PRESSURE: 125 MMHG | HEART RATE: 98 BPM

## 2021-07-07 PROCEDURE — 74011250636 HC RX REV CODE- 250/636: Performed by: NURSE PRACTITIONER

## 2021-07-07 PROCEDURE — 74011000250 HC RX REV CODE- 250: Performed by: NURSE PRACTITIONER

## 2021-07-07 PROCEDURE — 96374 THER/PROPH/DIAG INJ IV PUSH: CPT

## 2021-07-07 RX ORDER — SODIUM CHLORIDE 0.9 % (FLUSH) 0.9 %
10-20 SYRINGE (ML) INJECTION AS NEEDED
Status: DISCONTINUED | OUTPATIENT
Start: 2021-07-07 | End: 2021-07-09 | Stop reason: HOSPADM

## 2021-07-07 RX ADMIN — Medication 10 ML: at 14:05

## 2021-07-07 RX ADMIN — Medication 10 ML: at 14:09

## 2021-07-07 RX ADMIN — SODIUM CHLORIDE 500 MG: 9 INJECTION, SOLUTION INTRAMUSCULAR; INTRAVENOUS; SUBCUTANEOUS at 14:06

## 2021-07-07 NOTE — PROGRESS NOTES
Arrived to the Infusion Center.  Daptomycin completed and tolerated well. PICC line dressing changed d/t being loose. Any issues or concerns during appointment: none  Patient given education on antibiotic therapy  Instructed patient to notify provider for any issues or worrisome symptoms. They verbalized understanding. Patient aware of next infusion appointment scheduled for 7/8/21 at 2pm  Discharged ambulatory with self.

## 2021-07-08 ENCOUNTER — HOSPITAL ENCOUNTER (OUTPATIENT)
Dept: INFUSION THERAPY | Age: 21
Discharge: HOME | End: 2021-07-08

## 2021-07-08 VITALS
HEART RATE: 80 BPM | OXYGEN SATURATION: 100 % | TEMPERATURE: 98.3 F | DIASTOLIC BLOOD PRESSURE: 65 MMHG | RESPIRATION RATE: 18 BRPM | SYSTOLIC BLOOD PRESSURE: 98 MMHG

## 2021-07-08 PROCEDURE — 74011250636 HC RX REV CODE- 250/636: Performed by: NURSE PRACTITIONER

## 2021-07-08 PROCEDURE — 96374 THER/PROPH/DIAG INJ IV PUSH: CPT

## 2021-07-08 PROCEDURE — 74011000250 HC RX REV CODE- 250: Performed by: NURSE PRACTITIONER

## 2021-07-08 RX ORDER — SODIUM CHLORIDE 0.9 % (FLUSH) 0.9 %
10 SYRINGE (ML) INJECTION AS NEEDED
Status: DISCONTINUED | OUTPATIENT
Start: 2021-07-08 | End: 2021-07-10 | Stop reason: HOSPADM

## 2021-07-08 RX ADMIN — SODIUM CHLORIDE 500 MG: 9 INJECTION, SOLUTION INTRAMUSCULAR; INTRAVENOUS; SUBCUTANEOUS at 14:19

## 2021-07-08 RX ADMIN — Medication 10 ML: at 14:21

## 2021-07-08 RX ADMIN — Medication 10 ML: at 14:15

## 2021-07-08 NOTE — PROGRESS NOTES
Arrived to the [facility]. Daptomycin completed. Patient tolerated well. Any issues or concerns during appointment: no.  Patient aware of next infusion appointment on 7/9/2021 (date) at 2 pm (time). Discharged ambulatory with self.

## 2021-07-09 ENCOUNTER — HOSPITAL ENCOUNTER (OUTPATIENT)
Dept: INFUSION THERAPY | Age: 21
Discharge: HOME | End: 2021-07-09

## 2021-07-09 VITALS
HEART RATE: 124 BPM | SYSTOLIC BLOOD PRESSURE: 108 MMHG | OXYGEN SATURATION: 97 % | TEMPERATURE: 98.4 F | DIASTOLIC BLOOD PRESSURE: 59 MMHG | RESPIRATION RATE: 16 BRPM

## 2021-07-09 PROCEDURE — 96374 THER/PROPH/DIAG INJ IV PUSH: CPT

## 2021-07-09 PROCEDURE — 74011250636 HC RX REV CODE- 250/636: Performed by: NURSE PRACTITIONER

## 2021-07-09 PROCEDURE — 74011000250 HC RX REV CODE- 250: Performed by: NURSE PRACTITIONER

## 2021-07-09 RX ORDER — SODIUM CHLORIDE 0.9 % (FLUSH) 0.9 %
10 SYRINGE (ML) INJECTION AS NEEDED
Status: DISCONTINUED | OUTPATIENT
Start: 2021-07-09 | End: 2021-07-11 | Stop reason: HOSPADM

## 2021-07-09 RX ADMIN — SODIUM CHLORIDE 500 MG: 9 INJECTION, SOLUTION INTRAMUSCULAR; INTRAVENOUS; SUBCUTANEOUS at 14:40

## 2021-07-09 RX ADMIN — Medication 10 ML: at 14:42

## 2021-07-09 RX ADMIN — Medication 10 ML: at 14:38

## 2021-07-09 NOTE — PROGRESS NOTES
Arrived to the [facility]. Daptomycin completed. Patient tolerated well. Any issues or concerns during appointment: none. Discharged via wheelchair.     Bud Levi RN

## 2021-07-10 ENCOUNTER — HOSPITAL ENCOUNTER (OUTPATIENT)
Dept: INFUSION THERAPY | Age: 21
Discharge: HOME | End: 2021-07-10

## 2021-07-10 VITALS
OXYGEN SATURATION: 97 % | RESPIRATION RATE: 18 BRPM | SYSTOLIC BLOOD PRESSURE: 103 MMHG | TEMPERATURE: 98.3 F | DIASTOLIC BLOOD PRESSURE: 63 MMHG | HEART RATE: 107 BPM

## 2021-07-10 PROCEDURE — 74011000250 HC RX REV CODE- 250: Performed by: NURSE PRACTITIONER

## 2021-07-10 PROCEDURE — 74011250636 HC RX REV CODE- 250/636: Performed by: NURSE PRACTITIONER

## 2021-07-10 PROCEDURE — 96374 THER/PROPH/DIAG INJ IV PUSH: CPT

## 2021-07-10 RX ORDER — SODIUM CHLORIDE 0.9 % (FLUSH) 0.9 %
10 SYRINGE (ML) INJECTION EVERY 8 HOURS
Status: DISCONTINUED | OUTPATIENT
Start: 2021-07-10 | End: 2021-07-12 | Stop reason: HOSPADM

## 2021-07-10 RX ADMIN — Medication 10 ML: at 14:20

## 2021-07-10 RX ADMIN — SODIUM CHLORIDE 500 MG: 9 INJECTION, SOLUTION INTRAMUSCULAR; INTRAVENOUS; SUBCUTANEOUS at 14:15

## 2021-07-10 NOTE — PROGRESS NOTES
Arrived to the [facility]. Daptomycin completed. Provided education on Daptomycin    Patient instructed to report any side affects to ordering provider. Patient tolerated infusion. Any issues or concerns during appointment: none. Patient aware of next infusion appointment on 7/11/21 (date) at 1 PM (time). Discharged with crutches and family .

## 2021-07-11 ENCOUNTER — HOSPITAL ENCOUNTER (OUTPATIENT)
Dept: INFUSION THERAPY | Age: 21
Discharge: HOME | End: 2021-07-11

## 2021-07-11 VITALS
SYSTOLIC BLOOD PRESSURE: 100 MMHG | OXYGEN SATURATION: 97 % | HEART RATE: 111 BPM | TEMPERATURE: 97.9 F | RESPIRATION RATE: 18 BRPM | DIASTOLIC BLOOD PRESSURE: 58 MMHG

## 2021-07-11 PROCEDURE — 96374 THER/PROPH/DIAG INJ IV PUSH: CPT

## 2021-07-11 PROCEDURE — 74011250636 HC RX REV CODE- 250/636: Performed by: NURSE PRACTITIONER

## 2021-07-11 PROCEDURE — 74011000250 HC RX REV CODE- 250: Performed by: NURSE PRACTITIONER

## 2021-07-11 RX ORDER — SODIUM CHLORIDE 0.9 % (FLUSH) 0.9 %
10 SYRINGE (ML) INJECTION AS NEEDED
Status: DISCONTINUED | OUTPATIENT
Start: 2021-07-11 | End: 2021-07-13 | Stop reason: HOSPADM

## 2021-07-11 RX ADMIN — Medication 10 ML: at 13:30

## 2021-07-11 RX ADMIN — Medication 10 ML: at 13:00

## 2021-07-11 RX ADMIN — SODIUM CHLORIDE 500 MG: 9 INJECTION, SOLUTION INTRAMUSCULAR; INTRAVENOUS; SUBCUTANEOUS at 13:27

## 2021-07-11 NOTE — PROGRESS NOTES
Arrived to the [facility]. Assessment completed. Daptomycin injection administered. Patient tolerated well. Any issues or concerns during appointment: none. Patient aware of next infusion appointment on 07/12/2021 at 1400. Discharged via wheelchair.

## 2021-07-12 ENCOUNTER — HOSPITAL ENCOUNTER (OUTPATIENT)
Dept: INFUSION THERAPY | Age: 21
Discharge: HOME | End: 2021-07-12

## 2021-07-12 VITALS
OXYGEN SATURATION: 97 % | HEART RATE: 112 BPM | DIASTOLIC BLOOD PRESSURE: 65 MMHG | SYSTOLIC BLOOD PRESSURE: 113 MMHG | TEMPERATURE: 98 F | RESPIRATION RATE: 18 BRPM

## 2021-07-12 LAB
ALBUMIN SERPL-MCNC: 3.8 G/DL (ref 3.5–5)
ALBUMIN/GLOB SERPL: 1.1 {RATIO} (ref 1.2–3.5)
ALP SERPL-CCNC: 100 U/L (ref 50–136)
ALT SERPL-CCNC: 36 U/L (ref 12–65)
AST SERPL-CCNC: 20 U/L (ref 15–37)
BASOPHILS # BLD: 0 K/UL (ref 0–0.2)
BASOPHILS NFR BLD: 1 % (ref 0–2)
BILIRUB DIRECT SERPL-MCNC: <0.1 MG/DL
BILIRUB SERPL-MCNC: 0.3 MG/DL (ref 0.2–1.1)
CK SERPL-CCNC: 60 U/L (ref 21–215)
CRP SERPL-MCNC: <0.3 MG/DL (ref 0–0.9)
DIFFERENTIAL METHOD BLD: NORMAL
EOSINOPHIL # BLD: 0.3 K/UL (ref 0–0.8)
EOSINOPHIL NFR BLD: 5 % (ref 0.5–7.8)
ERYTHROCYTE [DISTWIDTH] IN BLOOD BY AUTOMATED COUNT: 13.2 % (ref 11.9–14.6)
ERYTHROCYTE [SEDIMENTATION RATE] IN BLOOD: 12 MM/HR (ref 0–20)
GLOBULIN SER CALC-MCNC: 3.6 G/DL (ref 2.3–3.5)
HCT VFR BLD AUTO: 40.1 % (ref 35.8–46.3)
HGB BLD-MCNC: 13.1 G/DL (ref 11.7–15.4)
IMM GRANULOCYTES # BLD AUTO: 0 K/UL (ref 0–0.5)
IMM GRANULOCYTES NFR BLD AUTO: 0 % (ref 0–5)
LYMPHOCYTES # BLD: 2 K/UL (ref 0.5–4.6)
LYMPHOCYTES NFR BLD: 31 % (ref 13–44)
MCH RBC QN AUTO: 29.8 PG (ref 26.1–32.9)
MCHC RBC AUTO-ENTMCNC: 32.7 G/DL (ref 31.4–35)
MCV RBC AUTO: 91.1 FL (ref 79.6–97.8)
MONOCYTES # BLD: 0.4 K/UL (ref 0.1–1.3)
MONOCYTES NFR BLD: 6 % (ref 4–12)
NEUTS SEG # BLD: 3.8 K/UL (ref 1.7–8.2)
NEUTS SEG NFR BLD: 57 % (ref 43–78)
NRBC # BLD: 0 K/UL (ref 0–0.2)
PLATELET # BLD AUTO: 239 K/UL (ref 150–450)
PMV BLD AUTO: 10.6 FL (ref 9.4–12.3)
PROT SERPL-MCNC: 7.4 G/DL (ref 6.3–8.2)
RBC # BLD AUTO: 4.4 M/UL (ref 4.05–5.2)
WBC # BLD AUTO: 6.6 K/UL (ref 4.3–11.1)

## 2021-07-12 PROCEDURE — 86140 C-REACTIVE PROTEIN: CPT

## 2021-07-12 PROCEDURE — 80076 HEPATIC FUNCTION PANEL: CPT

## 2021-07-12 PROCEDURE — 82550 ASSAY OF CK (CPK): CPT

## 2021-07-12 PROCEDURE — 96374 THER/PROPH/DIAG INJ IV PUSH: CPT

## 2021-07-12 PROCEDURE — 74011250636 HC RX REV CODE- 250/636: Performed by: NURSE PRACTITIONER

## 2021-07-12 PROCEDURE — 85025 COMPLETE CBC W/AUTO DIFF WBC: CPT

## 2021-07-12 PROCEDURE — 85652 RBC SED RATE AUTOMATED: CPT

## 2021-07-12 PROCEDURE — 74011000250 HC RX REV CODE- 250: Performed by: NURSE PRACTITIONER

## 2021-07-12 RX ORDER — SODIUM CHLORIDE 0.9 % (FLUSH) 0.9 %
10 SYRINGE (ML) INJECTION AS NEEDED
Status: DISCONTINUED | OUTPATIENT
Start: 2021-07-12 | End: 2021-07-14 | Stop reason: HOSPADM

## 2021-07-12 RX ADMIN — Medication 10 ML: at 14:42

## 2021-07-12 RX ADMIN — Medication 10 ML: at 15:16

## 2021-07-12 RX ADMIN — SODIUM CHLORIDE 500 MG: 9 INJECTION, SOLUTION INTRAMUSCULAR; INTRAVENOUS; SUBCUTANEOUS at 15:13

## 2021-07-12 NOTE — PROGRESS NOTES
Patient ambulatory with boot and crutches to infusion area. Here for Daptomycin. PICC line to RUE flushed with positive blood return. Dressing, cap, and STAT lock changed due to poor dressing integrity. Patient states she has been scratching around the dressing. Sensitive dressing applied. Daptomycin administered. Patient aware of next appointment.

## 2021-07-13 ENCOUNTER — HOSPITAL ENCOUNTER (OUTPATIENT)
Dept: INFUSION THERAPY | Age: 21
Discharge: HOME | End: 2021-07-13

## 2021-07-13 VITALS
DIASTOLIC BLOOD PRESSURE: 72 MMHG | OXYGEN SATURATION: 97 % | SYSTOLIC BLOOD PRESSURE: 118 MMHG | TEMPERATURE: 98.4 F | RESPIRATION RATE: 18 BRPM | HEART RATE: 105 BPM

## 2021-07-13 PROCEDURE — 74011250636 HC RX REV CODE- 250/636: Performed by: NURSE PRACTITIONER

## 2021-07-13 PROCEDURE — 74011000250 HC RX REV CODE- 250: Performed by: NURSE PRACTITIONER

## 2021-07-13 PROCEDURE — 96374 THER/PROPH/DIAG INJ IV PUSH: CPT

## 2021-07-13 RX ADMIN — SODIUM CHLORIDE 500 MG: 9 INJECTION, SOLUTION INTRAMUSCULAR; INTRAVENOUS; SUBCUTANEOUS at 14:43

## 2021-07-13 NOTE — PROGRESS NOTES
Arrived to the [facility]. Daptomycin completed. Provided education on same. Patient instructed to report any side affects to ordering provider. Patient tolerated well. Any issues or concerns during appointment: none  Patient aware of next infusion appointment on 7/14  Discharged ambulatory with crutches with brother.

## 2021-07-14 ENCOUNTER — HOSPITAL ENCOUNTER (OUTPATIENT)
Dept: INFUSION THERAPY | Age: 21
Discharge: HOME | End: 2021-07-14

## 2021-07-14 VITALS
HEART RATE: 105 BPM | OXYGEN SATURATION: 98 % | DIASTOLIC BLOOD PRESSURE: 68 MMHG | TEMPERATURE: 98.1 F | SYSTOLIC BLOOD PRESSURE: 120 MMHG | RESPIRATION RATE: 17 BRPM

## 2021-07-14 LAB — CREAT SERPL-MCNC: 0.8 MG/DL (ref 0.6–1)

## 2021-07-14 PROCEDURE — 82565 ASSAY OF CREATININE: CPT

## 2021-07-14 PROCEDURE — 74011000250 HC RX REV CODE- 250: Performed by: NURSE PRACTITIONER

## 2021-07-14 PROCEDURE — 74011250636 HC RX REV CODE- 250/636: Performed by: NURSE PRACTITIONER

## 2021-07-14 PROCEDURE — 96374 THER/PROPH/DIAG INJ IV PUSH: CPT

## 2021-07-14 RX ORDER — SODIUM CHLORIDE 0.9 % (FLUSH) 0.9 %
10 SYRINGE (ML) INJECTION AS NEEDED
Status: DISCONTINUED | OUTPATIENT
Start: 2021-07-14 | End: 2021-07-16 | Stop reason: HOSPADM

## 2021-07-14 RX ADMIN — SODIUM CHLORIDE 500 MG: 9 INJECTION, SOLUTION INTRAMUSCULAR; INTRAVENOUS; SUBCUTANEOUS at 14:30

## 2021-07-14 RX ADMIN — Medication 10 ML: at 14:26

## 2021-07-14 RX ADMIN — Medication 10 ML: at 14:33

## 2021-07-14 NOTE — PROGRESS NOTES
Arrived to the [facility]. Daptomycin infusion completed. Patient tolerated well. Any issues or concerns during appointment: none. Patient aware of next infusion appointment on 07/15/2021 (date) at 1400 (time). Discharged ambulatory with crutches.

## 2021-07-15 ENCOUNTER — HOSPITAL ENCOUNTER (OUTPATIENT)
Dept: INFUSION THERAPY | Age: 21
Discharge: HOME | End: 2021-07-15

## 2021-07-15 VITALS
OXYGEN SATURATION: 98 % | SYSTOLIC BLOOD PRESSURE: 116 MMHG | HEART RATE: 118 BPM | TEMPERATURE: 97.9 F | RESPIRATION RATE: 18 BRPM | DIASTOLIC BLOOD PRESSURE: 63 MMHG

## 2021-07-15 PROCEDURE — 96374 THER/PROPH/DIAG INJ IV PUSH: CPT

## 2021-07-15 PROCEDURE — 74011250636 HC RX REV CODE- 250/636: Performed by: NURSE PRACTITIONER

## 2021-07-15 PROCEDURE — 74011000250 HC RX REV CODE- 250: Performed by: NURSE PRACTITIONER

## 2021-07-15 RX ORDER — SODIUM CHLORIDE 0.9 % (FLUSH) 0.9 %
10-20 SYRINGE (ML) INJECTION AS NEEDED
Status: DISCONTINUED | OUTPATIENT
Start: 2021-07-15 | End: 2021-07-17 | Stop reason: HOSPADM

## 2021-07-15 RX ADMIN — SODIUM CHLORIDE 500 MG: 9 INJECTION, SOLUTION INTRAMUSCULAR; INTRAVENOUS; SUBCUTANEOUS at 14:23

## 2021-07-15 RX ADMIN — Medication 20 ML: at 14:26

## 2021-07-15 NOTE — PROGRESS NOTES
Arrived to the Infusion Center.  Daptomycin completed and tolerated well. Any issues or concerns during appointment: none  Patient given education on antibiotic therapy  Instructed patient to notify provider for any issues or worrisome symptoms. They verbalized understanding. Patient aware of next infusion appointment scheduled for 7/16/21 at 3pm  Discharged ambulatory on crutches.

## 2021-07-16 ENCOUNTER — HOSPITAL ENCOUNTER (OUTPATIENT)
Dept: INFUSION THERAPY | Age: 21
End: 2021-07-16

## 2021-07-24 ENCOUNTER — HOSPITAL ENCOUNTER (EMERGENCY)
Age: 21
Discharge: HOME | End: 2021-07-24
Attending: EMERGENCY MEDICINE

## 2021-07-24 ENCOUNTER — APPOINTMENT (OUTPATIENT)
Dept: GENERAL RADIOLOGY | Age: 21
End: 2021-07-24
Attending: EMERGENCY MEDICINE

## 2021-07-24 VITALS
WEIGHT: 125 LBS | HEART RATE: 99 BPM | HEIGHT: 62 IN | TEMPERATURE: 98.6 F | DIASTOLIC BLOOD PRESSURE: 62 MMHG | RESPIRATION RATE: 19 BRPM | BODY MASS INDEX: 23 KG/M2 | OXYGEN SATURATION: 97 % | SYSTOLIC BLOOD PRESSURE: 97 MMHG

## 2021-07-24 DIAGNOSIS — M25.572 ACUTE LEFT ANKLE PAIN: Primary | ICD-10-CM

## 2021-07-24 PROCEDURE — 99282 EMERGENCY DEPT VISIT SF MDM: CPT

## 2021-07-24 PROCEDURE — 73630 X-RAY EXAM OF FOOT: CPT

## 2021-07-24 RX ORDER — PROMETHAZINE HYDROCHLORIDE 25 MG/1
25 TABLET ORAL
Qty: 15 TABLET | Refills: 0 | Status: SHIPPED | OUTPATIENT
Start: 2021-07-24 | End: 2021-10-28

## 2021-07-24 RX ORDER — HYDROMORPHONE HYDROCHLORIDE 2 MG/1
1 TABLET ORAL
Qty: 15 TABLET | Refills: 0 | Status: SHIPPED | OUTPATIENT
Start: 2021-07-24 | End: 2021-07-29

## 2021-07-24 NOTE — ED TRIAGE NOTES
Pt c/o left foot pain, swelling and discoloration, left foot bluish-purple that started today while in the store. Pt states she was in a motorized wheelchair. Pt states she had surgery on left foot 4/19 and on 6/4.

## 2021-07-25 NOTE — DISCHARGE INSTRUCTIONS
Follow-up with Dr. Sebastian Laird as scheduled. Call his office to be seen sooner if your pain persists or worsens.

## 2021-07-25 NOTE — ED NOTES
I have reviewed discharge instructions with the patient. The patient verbalized understanding. Patient left ED via Discharge Method: ambulatory to Home with mother. Opportunity for questions and clarification provided. Patient given 2 scripts. To continue your aftercare when you leave the hospital, you may receive an automated call from our care team to check in on how you are doing. This is a free service and part of our promise to provide the best care and service to meet your aftercare needs.  If you have questions, or wish to unsubscribe from this service please call [phone]. Thank you for Choosing our [facility] Emergency Department.
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
## 2021-10-16 ENCOUNTER — APPOINTMENT (OUTPATIENT)
Dept: GENERAL RADIOLOGY | Age: 21
End: 2021-10-16
Attending: EMERGENCY MEDICINE

## 2021-10-16 ENCOUNTER — HOSPITAL ENCOUNTER (EMERGENCY)
Age: 21
Discharge: HOME | End: 2021-10-16
Attending: EMERGENCY MEDICINE

## 2021-10-16 VITALS
DIASTOLIC BLOOD PRESSURE: 70 MMHG | HEART RATE: 119 BPM | TEMPERATURE: 98 F | BODY MASS INDEX: 23 KG/M2 | SYSTOLIC BLOOD PRESSURE: 111 MMHG | OXYGEN SATURATION: 93 % | WEIGHT: 125 LBS | RESPIRATION RATE: 18 BRPM | HEIGHT: 62 IN

## 2021-10-16 DIAGNOSIS — J45.31 MILD PERSISTENT ASTHMA WITH ACUTE EXACERBATION: Primary | ICD-10-CM

## 2021-10-16 LAB
ATRIAL RATE: 121 BPM
CALCULATED P AXIS, ECG09: 67 DEGREES
CALCULATED R AXIS, ECG10: 40 DEGREES
CALCULATED T AXIS, ECG11: 47 DEGREES
DIAGNOSIS, 93000: NORMAL
P-R INTERVAL, ECG05: 164 MS
Q-T INTERVAL, ECG07: 292 MS
QRS DURATION, ECG06: 78 MS
QTC CALCULATION (BEZET), ECG08: 414 MS
VENTRICULAR RATE, ECG03: 121 BPM

## 2021-10-16 PROCEDURE — 94664 DEMO&/EVAL PT USE INHALER: CPT

## 2021-10-16 PROCEDURE — 74011000250 HC RX REV CODE- 250: Performed by: EMERGENCY MEDICINE

## 2021-10-16 PROCEDURE — 94640 AIRWAY INHALATION TREATMENT: CPT

## 2021-10-16 PROCEDURE — 93005 ELECTROCARDIOGRAM TRACING: CPT | Performed by: EMERGENCY MEDICINE

## 2021-10-16 PROCEDURE — 99284 EMERGENCY DEPT VISIT MOD MDM: CPT

## 2021-10-16 PROCEDURE — 94760 N-INVAS EAR/PLS OXIMETRY 1: CPT

## 2021-10-16 PROCEDURE — 71046 X-RAY EXAM CHEST 2 VIEWS: CPT

## 2021-10-16 RX ORDER — PREDNISONE 5 MG/1
TABLET ORAL
Qty: 48 TABLET | Refills: 0 | Status: SHIPPED | OUTPATIENT
Start: 2021-10-16 | End: 2021-10-28

## 2021-10-16 RX ORDER — ALBUTEROL SULFATE 0.83 MG/ML
2.5 SOLUTION RESPIRATORY (INHALATION)
Status: COMPLETED | OUTPATIENT
Start: 2021-10-16 | End: 2021-10-16

## 2021-10-16 RX ORDER — ALBUTEROL SULFATE 90 UG/1
2 AEROSOL, METERED RESPIRATORY (INHALATION)
Qty: 1 EACH | Refills: 0 | Status: SHIPPED | OUTPATIENT
Start: 2021-10-16

## 2021-10-16 RX ORDER — CODEINE PHOSPHATE AND GUAIFENESIN 10; 100 MG/5ML; MG/5ML
5-10 SOLUTION ORAL
Qty: 150 ML | Refills: 0 | Status: SHIPPED | OUTPATIENT
Start: 2021-10-16 | End: 2021-10-21

## 2021-10-16 RX ADMIN — ALBUTEROL SULFATE 2.5 MG: 2.5 SOLUTION RESPIRATORY (INHALATION) at 11:07

## 2021-10-16 NOTE — ED NOTES
I have reviewed discharge instructions with the patient. The patient verbalized understanding. Patient left ED via Discharge Method: ambulatory to Home with self. Opportunity for questions and clarification provided. Patient given 3 scripts. To continue your aftercare when you leave the hospital, you may receive an automated call from our care team to check in on how you are doing. This is a free service and part of our promise to provide the best care and service to meet your aftercare needs.  If you have questions, or wish to unsubscribe from this service please call [phone]. Thank you for Choosing our [facility] Emergency Department.

## 2021-10-16 NOTE — ED PROVIDER NOTES
77-year-old  female with history of reactive airway disease presents emerged department complaining of increased shortness of breath, cough, sneezing and congestion over the last 2 to 3 weeks. Patient states minimal improvement with her inhaler. Cough is nonproductive, denies fever or chills. Never been admitted for asthma. The history is provided by the patient. Shortness of Breath  This is a new problem. The average episode lasts 3 weeks. The problem occurs continuously. The current episode started more than 1 week ago. The problem has been gradually worsening. Associated symptoms include cough, wheezing and chest pain. Pertinent negatives include no fever, no headaches, no coryza, no rhinorrhea, no sore throat, no swollen glands, no ear pain, no neck pain, no sputum production, no hemoptysis, no PND, no orthopnea, no syncope, no vomiting, no abdominal pain, no rash, no leg pain, no leg swelling and no claudication. It is unknown what precipitated the problem. She has tried beta-agonist inhalers for the symptoms. The treatment provided mild relief. She has had no prior hospitalizations. She has had prior ED visits. She has had no prior ICU admissions. Associated medical issues include asthma. Associated medical issues do not include COPD, pneumonia, chronic lung disease, PE, CAD or heart failure. Past Medical History:   Diagnosis Date    Asthma     Eczema     Esophagitis, eosinophilic     Gastrointestinal disorder     esophageal disorder    Other ill-defined conditions(799.89)     eczema    León-Parkinson-White syndrome        Past Surgical History:   Procedure Laterality Date    HX APPENDECTOMY  2016    HX CHOLECYSTECTOMY  2016    HX ORTHOPAEDIC Left 04/19/2021    ORIF of Bimalleolar ankle fracture         History reviewed. No pertinent family history.     Social History     Socioeconomic History    Marital status: SINGLE     Spouse name: Not on file    Number of children: Not on file    Years of education: Not on file    Highest education level: Not on file   Occupational History    Not on file   Tobacco Use    Smoking status: Former Smoker    Smokeless tobacco: Never Used   Vaping Use    Vaping Use: Never used   Substance and Sexual Activity    Alcohol use: No    Drug use: Yes     Types: Marijuana    Sexual activity: Never   Other Topics Concern    Not on file   Social History Narrative    Not on file     Social Determinants of Health     Financial Resource Strain:     Difficulty of Paying Living Expenses:    Food Insecurity:     Worried About Running Out of Food in the Last Year:     [address] in the Last Year:    Transportation Needs:     Lack of Transportation (Medical):  Lack of Transportation (Non-Medical):    Physical Activity:     Days of Exercise per Week:     Minutes of Exercise per Session:    Stress:     Feeling of Stress :    Social Connections:     Frequency of Communication with Friends and Family:     Frequency of Social Gatherings with Friends and Family:     Attends Confucianism Services:     Active Member of Clubs or Organizations:     Attends Club or Organization Meetings:     Marital Status:    Intimate Partner Violence:     Fear of Current or Ex-Partner:     Emotionally Abused:     Physically Abused:     Sexually Abused: ALLERGIES: Latex and Pork derived (porcine)    Review of Systems   Constitutional: Negative for chills and fever. HENT: Negative for ear pain, rhinorrhea and sore throat. Respiratory: Positive for cough, shortness of breath and wheezing. Negative for hemoptysis and sputum production. Cardiovascular: Positive for chest pain. Negative for palpitations, orthopnea, claudication, leg swelling, syncope and PND. Gastrointestinal: Negative for abdominal pain, nausea and vomiting. Musculoskeletal: Negative for neck pain. Skin: Negative for rash. Neurological: Negative for headaches.    All other systems reviewed and are negative. Vitals:    10/16/21 1008   BP: 111/70   Pulse: (!) 119   Resp: 18   Temp: 98 °F (36.7 °C)   SpO2: 96%   Weight: 56.7 kg (125 lb)   Height: 5' 2\" (1.575 m)            Physical Exam  Vitals and nursing note reviewed. Constitutional:       General: She is not in acute distress. Appearance: She is well-developed. HENT:      Head: Normocephalic and atraumatic. Right Ear: External ear normal.      Left Ear: External ear normal.   Eyes:      Extraocular Movements: Extraocular movements intact. Conjunctiva/sclera: Conjunctivae normal.      Pupils: Pupils are equal, round, and reactive to light. Cardiovascular:      Rate and Rhythm: Regular rhythm. Tachycardia present. Pulses: Normal pulses. Heart sounds: Normal heart sounds. No murmur heard. Pulmonary:      Effort: Pulmonary effort is normal.      Breath sounds: Wheezing (Mild scattered) present. No decreased breath sounds, rhonchi or rales. Chest:      Chest wall: Tenderness present. Abdominal:      General: Bowel sounds are normal.      Palpations: Abdomen is soft. Tenderness: There is no abdominal tenderness. Musculoskeletal:         General: Normal range of motion. Cervical back: Normal range of motion and neck supple. Skin:     General: Skin is warm and dry. Capillary Refill: Capillary refill takes less than 2 seconds. Neurological:      General: No focal deficit present. Mental Status: She is alert and oriented to person, place, and time. Psychiatric:         Mood and Affect: Mood is anxious (Mildly).          Behavior: Behavior normal.          MDM  Number of Diagnoses or Management Options  Mild persistent asthma with acute exacerbation: new and requires workup     Amount and/or Complexity of Data Reviewed  Tests in the radiology section of CPT®: ordered and reviewed  Tests in the medicine section of CPT®: ordered and reviewed (ECG interpretation for ECG dated 10/16/2021 at 10:11 AM: ECG reveals sinus tachycardia at a rate of 121 bpm, normal ME and QTc intervals and normal axis. Essentially normal ECG but for rate. Luzma Jenkins MD  )  Review and summarize past medical records: yes    Risk of Complications, Morbidity, and/or Mortality  Presenting problems: moderate  Diagnostic procedures: moderate  Management options: moderate    Patient Progress  Patient progress: improved         Procedures  The patient was observed in the ED. patient given a breathing treatment here in the emergency department with moderate improvement in her breathing. Will be discharged home on a prednisone Dosepak, refill of her albuterol inhaler, and a cough medicine. Results Reviewed:      Recent Results (from the past 24 hour(s))   EKG, 12 LEAD, INITIAL    Collection Time: 10/16/21 10:11 AM   Result Value Ref Range    Ventricular Rate 121 BPM    Atrial Rate 121 BPM    P-R Interval 164 ms    QRS Duration 78 ms    Q-T Interval 292 ms    QTC Calculation (Bezet) 414 ms    Calculated P Axis 67 degrees    Calculated R Axis 40 degrees    Calculated T Axis 47 degrees    Diagnosis       Sinus tachycardia  Otherwise normal ECG  When compared with ECG of 07-JUN-2021 19:29,  No significant change was found       XR CHEST PA LAT   Final Result   No consolidation. I discussed the results of all labs, procedures, radiographs, and treatments with the patient and available family. Treatment plan is agreed upon and the patient is ready for discharge. All voiced understanding of the discharge plan and medication instructions or changes as appropriate. Questions about treatment in the ED were answered. All were encouraged to return should symptoms worsen or new problems develop.

## 2021-10-16 NOTE — ED TRIAGE NOTES
Pt reports chest pain and \"issues of breathing\"  Pt states she has been wheezing and coughing. No relief with inhaler.   For past 2 -3 weeks

## 2021-10-28 ENCOUNTER — APPOINTMENT (OUTPATIENT)
Dept: CT IMAGING | Age: 21
End: 2021-10-28
Attending: EMERGENCY MEDICINE

## 2021-10-28 ENCOUNTER — HOSPITAL ENCOUNTER (EMERGENCY)
Age: 21
Discharge: HOME | End: 2021-10-28
Attending: EMERGENCY MEDICINE

## 2021-10-28 ENCOUNTER — APPOINTMENT (OUTPATIENT)
Dept: GENERAL RADIOLOGY | Age: 21
End: 2021-10-28
Attending: EMERGENCY MEDICINE

## 2021-10-28 VITALS
HEIGHT: 62 IN | HEART RATE: 79 BPM | OXYGEN SATURATION: 98 % | RESPIRATION RATE: 16 BRPM | BODY MASS INDEX: 23 KG/M2 | WEIGHT: 125 LBS | SYSTOLIC BLOOD PRESSURE: 95 MMHG | DIASTOLIC BLOOD PRESSURE: 64 MMHG | TEMPERATURE: 98.6 F

## 2021-10-28 DIAGNOSIS — S16.1XXA STRAIN OF NECK MUSCLE, INITIAL ENCOUNTER: Primary | ICD-10-CM

## 2021-10-28 DIAGNOSIS — S06.0X9A CONCUSSION WITH LOSS OF CONSCIOUSNESS, INITIAL ENCOUNTER: ICD-10-CM

## 2021-10-28 LAB — HCG UR QL: NEGATIVE

## 2021-10-28 PROCEDURE — 81025 URINE PREGNANCY TEST: CPT

## 2021-10-28 PROCEDURE — 99284 EMERGENCY DEPT VISIT MOD MDM: CPT

## 2021-10-28 PROCEDURE — 81003 URINALYSIS AUTO W/O SCOPE: CPT

## 2021-10-28 PROCEDURE — 72040 X-RAY EXAM NECK SPINE 2-3 VW: CPT

## 2021-10-28 PROCEDURE — 70450 CT HEAD/BRAIN W/O DYE: CPT

## 2021-10-28 RX ORDER — ORPHENADRINE CITRATE 100 MG/1
100 TABLET, EXTENDED RELEASE ORAL 2 TIMES DAILY
Qty: 20 TABLET | Refills: 0 | Status: SHIPPED | OUTPATIENT
Start: 2021-10-28 | End: 2022-02-01 | Stop reason: CLARIF

## 2021-10-28 RX ORDER — DICLOFENAC SODIUM 75 MG/1
75 TABLET, DELAYED RELEASE ORAL 2 TIMES DAILY
Qty: 20 TABLET | Refills: 0 | Status: SHIPPED | OUTPATIENT
Start: 2021-10-28 | End: 2022-02-01 | Stop reason: CLARIF
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
## 2021-10-29 NOTE — ED NOTES
I have reviewed discharge instructions with the patient. The patient verbalized understanding. Patient left ED via Discharge Method: ambulatory to Home with self. Opportunity for questions and clarification provided. Patient given 2 scripts. To continue your aftercare when you leave the hospital, you may receive an automated call from our care team to check in on how you are doing. This is a free service and part of our promise to provide the best care and service to meet your aftercare needs.  If you have questions, or wish to unsubscribe from this service please call [phone]. Thank you for Choosing our [facility] Emergency Department.
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
## 2022-01-05 ENCOUNTER — APPOINTMENT (RX ONLY)
Dept: URBAN - METROPOLITAN AREA CLINIC 349 | Facility: CLINIC | Age: 22
Setting detail: DERMATOLOGY
End: 2022-01-05

## 2022-01-05 DIAGNOSIS — L20.89 OTHER ATOPIC DERMATITIS: ICD-10-CM | Status: INADEQUATELY CONTROLLED

## 2022-01-05 DIAGNOSIS — Z71.89 OTHER SPECIFIED COUNSELING: ICD-10-CM

## 2022-01-05 DIAGNOSIS — D22 MELANOCYTIC NEVI: ICD-10-CM

## 2022-01-05 PROBLEM — L20.84 INTRINSIC (ALLERGIC) ECZEMA: Status: ACTIVE | Noted: 2022-01-05

## 2022-01-05 PROBLEM — D22.5 MELANOCYTIC NEVI OF TRUNK: Status: ACTIVE | Noted: 2022-01-05

## 2022-01-05 PROCEDURE — ? PRESCRIPTION

## 2022-01-05 PROCEDURE — ? COUNSELING

## 2022-01-05 PROCEDURE — 99204 OFFICE O/P NEW MOD 45 MIN: CPT

## 2022-01-05 RX ORDER — MINOCYCLINE HYDROCHLORIDE 100 MG/1
TABLET ORAL
Qty: 30 | Refills: 2 | Status: ERX | COMMUNITY
Start: 2022-01-05

## 2022-01-05 RX ORDER — TRIAMCINOLONE ACETONIDE 1 MG/G
OINTMENT TOPICAL
Qty: 453.6 | Refills: 3 | Status: ERX | COMMUNITY
Start: 2022-01-05

## 2022-01-05 RX ORDER — DUPILUMAB 300 MG/2ML
INJECTION, SOLUTION SUBCUTANEOUS
Qty: 3 | Refills: 0 | Status: ERX

## 2022-01-05 RX ORDER — DUPILUMAB 300 MG/2ML
INJECTION, SOLUTION SUBCUTANEOUS
Qty: 2 | Refills: 6 | Status: ERX | COMMUNITY
Start: 2022-01-05

## 2022-01-05 RX ADMIN — TRIAMCINOLONE ACETONIDE: 1 OINTMENT TOPICAL at 00:00

## 2022-01-05 RX ADMIN — DUPILUMAB: 300 INJECTION, SOLUTION SUBCUTANEOUS at 00:00

## 2022-01-05 RX ADMIN — MINOCYCLINE HYDROCHLORIDE: 100 TABLET ORAL at 00:00

## 2022-01-05 ASSESSMENT — LOCATION SIMPLE DESCRIPTION DERM
LOCATION SIMPLE: ABDOMEN
LOCATION SIMPLE: CHEST

## 2022-01-05 ASSESSMENT — LOCATION DETAILED DESCRIPTION DERM
LOCATION DETAILED: LEFT RIB CAGE
LOCATION DETAILED: RIGHT LATERAL SUPERIOR CHEST

## 2022-01-05 ASSESSMENT — BSA RASH: BSA RASH: 90

## 2022-01-05 ASSESSMENT — LOCATION ZONE DERM: LOCATION ZONE: TRUNK

## 2022-01-13 ENCOUNTER — HOSPITAL ENCOUNTER (OUTPATIENT)
Dept: LAB | Age: 22
Discharge: HOME | End: 2022-01-13
Attending: ORTHOPAEDIC SURGERY
Payer: COMMERCIAL

## 2022-01-13 DIAGNOSIS — S82.62XK CLOSED DISPLACED FRACTURE OF LEFT LATERAL MALLEOLUS WITH NONUNION: ICD-10-CM

## 2022-01-13 DIAGNOSIS — T84.7XXD HARDWARE COMPLICATING WOUND INFECTION, SUBSEQUENT ENCOUNTER: ICD-10-CM

## 2022-01-13 LAB
CRP SERPL-MCNC: 2.3 MG/DL (ref 0–0.9)
ERYTHROCYTE [SEDIMENTATION RATE] IN BLOOD: 2 MM/HR (ref 0–20)

## 2022-01-13 PROCEDURE — 85652 RBC SED RATE AUTOMATED: CPT

## 2022-01-13 PROCEDURE — 86140 C-REACTIVE PROTEIN: CPT

## 2022-01-13 PROCEDURE — 36415 COLL VENOUS BLD VENIPUNCTURE: CPT

## 2022-01-13 NOTE — H&P (VIEW-ONLY)
History and physical    Patient: Olimpia Castillo MRN: [id number]  SSN: [id number]    YOB: 2000  Age: 24 y.o. Sex: female      Subjective:      Olimpia Castillo is a 24 y.o. female who is now about 9 months out from an injury to her left ankle. She had plate and screw fixation of the left lateral malleolus fracture. Unfortunately this was complicated by postoperative surgical wound infection. This necessitated her having her plate removed. She went on to clear her infection by all indications but she is continue to have pain in her left lateral malleolus site. I have been very hesitant to go back and do something about this and try to get this every chance to heal on its own just because she has so much trouble before and really hesitant to treat this operatively again just knowing her history and knowing that she had trouble with infection the first time. But she continues to have some discomfort in her left ankle and I feel like she is really waited for quite some time and this is really not getting any better for her. .    Past Medical History:   Diagnosis Date    Asthma     Eczema     Esophagitis, eosinophilic     Gastrointestinal disorder     esophageal disorder    Other ill-defined conditions(799.89)     eczema    León-Parkinson-White syndrome      Past Surgical History:   Procedure Laterality Date    HX APPENDECTOMY  2016    HX CHOLECYSTECTOMY  2016    HX ORTHOPAEDIC Left 04/19/2021    ORIF of Bimalleolar ankle fracture      FAMHX -No history of inflammatory arthritis   Social History     Tobacco Use    Smoking status: Former Smoker    Smokeless tobacco: Never Used   Substance Use Topics    Alcohol use: No      Current Outpatient Medications   Medication Sig Dispense Refill    orphenadrine citrate (NORFLEX) 100 mg sr tablet Take 1 Tablet by mouth two (2) times a day. 20 Tablet 0    diclofenac EC (VOLTAREN) 75 mg EC tablet Take 1 Tablet by mouth two (2) times a day.  21 Tablet 0    albuterol (PROVENTIL HFA, VENTOLIN HFA, PROAIR HFA) 90 mcg/actuation inhaler Take 2 Puffs by inhalation every four (4) hours as needed for Wheezing or Shortness of Breath. Recommend using at least 4 times a day for the first week. 1 Each 0    rizatriptan (Maxalt) 10 mg tablet Take 10 mg by mouth once as needed for Migraine. May repeat in 2 hours if needed      sertraline (ZOLOFT) 100 mg tablet 100 mg daily as needed. (Patient not taking: Reported on 7/24/2021)          Allergies   Allergen Reactions    Latex Rash    Pork Derived (Porcine) Other (comments)     Because of GI disorder       Review of Systems:  A comprehensive review of systems was negative except for that written in the History of Present Illness. Objective: There were no vitals filed for this visit. Physical Exam:  Physical Exam:  General:  Alert, cooperative, no distress, appears stated age. Orientation she is alert and oriented person place time and situation   Eyes:  Conjunctivae/corneas clear. PERRL, EOMs intact. Fundi benign   Ears:  Normal TMs and external ear canals both ears. Nose: Nares normal. Septum midline. Mucosa normal. No drainage or sinus tenderness. Mouth/Throat: Lips, mucosa, and tongue normal. Teeth and gums normal.   Neck: Supple, symmetrical, trachea midline, no adenopathy, thyroid: no enlargment/tenderness/nodules, no carotid bruit and no JVD. Back:   Symmetric, no curvature. ROM normal. No CVA tenderness. Lungs:   Clear to auscultation bilaterally. Heart:  Regular rate and rhythm, S1, S2 normal, no murmur, click, rub or gallop. Abdomen:   Soft, non-tender. Bowel sounds normal. No masses,  No organomegaly.        No lymphadenopathy in all 4 extremities  Alignmenther overall alignment of her left ankle is satisfactory  Range of motionshe has some limited range of motion with only about 10 degrees of dorsiflexion and 20 degrees of plantarflexion of her left ankle  Vasculardistal pulses palpable in left lower extremity  Sensory/motordeep tendon reflexes normal left lower extremity. Motor and sensory function intact. Stabilityshe is rather uncomfortable so is little bit difficult to really assess stability at the fracture  Tenderness to palpation throughout the left lateral malleolus area  Skinsurgical incision has completely healed now  Gait-she does walk with a mild antalgic gait left lower extremity    Assessment:     Hospital Problems  Date Reviewed: 11/23/2021    None        Left fibular nonunion    Xrays and or studies:    Indicationsfollow-up left lateral malleolus fracture nonunion, findings3 views of the left ankle shows the left lateral malleolus shows no evidence of healing. This does appear to be an established nonunion with a fracture gap of about 4 mm. Her ankle mortise to me does show a little bit of lateral translation of her talus but this is very minimal and appears to be stable throughout her postoperative films impressionestablished nonunion left lateral malleolus  Plan:     I have spoke with her regarding different treatment options. I try to make sure she understands once again the same thing I told her before which is the fact that I think with the gold standard for this which would be plate fixation and bone grafting then she would be at risk for her infection recurring. That will be my most significant concern. I am also concerned about whether or not this would help her with her symptoms or not. I do think it is something that seems to continue to bother her so I have tried to leave it a little bit up to her as far as whether or not she thinks it is bothering her enough that it would have risk of recurrence of her infection and if that happened and she likely would be back here with a nonunion that we just leave alone if that were to happen a second time. She seems understand this.   Another option of thought of is just trying to treat this with an intramedullary device. 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  The obvious advantage of this is we would not do a formal bone grafting at the nonunion site. The disadvantage obviously is that this may not be something that really gets her fracture to heal and she may continue to have pain and need a bone graft subsequent to that. She is still concerned about the risk of infection however and I think she likes the idea of just trying the intramedullary device as this sort of seems like a little bit of a middle ground between doing nothing and treating this with formal plate fixation and bone grafting. I do not think that is unreasonable.   So the overall surgical plan would be to treat the lateral malleolus nonunion with intramedullary linda or screw fixation as an outpatient and were going to try to get this done in the next few weeks for her    Signed By: Austin Lang MD     January 13, 2022

## 2022-01-13 NOTE — H&P
History and physical    Patient: Juliana Saldaña MRN: [id number]  SSN: [id number]    YOB: 2000  Age: 24 y.o. Sex: female      Subjective:      Juliana Saldaña is a 24 y.o. female who is now about 9 months out from an injury to her left ankle. She had plate and screw fixation of the left lateral malleolus fracture. Unfortunately this was complicated by postoperative surgical wound infection. This necessitated her having her plate removed. She went on to clear her infection by all indications but she is continue to have pain in her left lateral malleolus site. I have been very hesitant to go back and do something about this and try to get this every chance to heal on its own just because she has so much trouble before and really hesitant to treat this operatively again just knowing her history and knowing that she had trouble with infection the first time. But she continues to have some discomfort in her left ankle and I feel like she is really waited for quite some time and this is really not getting any better for her. .    Past Medical History:   Diagnosis Date    Asthma     Eczema     Esophagitis, eosinophilic     Gastrointestinal disorder     esophageal disorder    Other ill-defined conditions(799.89)     eczema    León-Parkinson-White syndrome      Past Surgical History:   Procedure Laterality Date    HX APPENDECTOMY  2016    HX CHOLECYSTECTOMY  2016    HX ORTHOPAEDIC Left 04/19/2021    ORIF of Bimalleolar ankle fracture      FAMHX -No history of inflammatory arthritis   Social History     Tobacco Use    Smoking status: Former Smoker    Smokeless tobacco: Never Used   Substance Use Topics    Alcohol use: No      Current Outpatient Medications   Medication Sig Dispense Refill    orphenadrine citrate (NORFLEX) 100 mg sr tablet Take 1 Tablet by mouth two (2) times a day. 20 Tablet 0    diclofenac EC (VOLTAREN) 75 mg EC tablet Take 1 Tablet by mouth two (2) times a day.  [address] [employer] [facility] Tablet 0    albuterol (PROVENTIL HFA, VENTOLIN HFA, PROAIR HFA) 90 mcg/actuation inhaler Take 2 Puffs by inhalation every four (4) hours as needed for Wheezing or Shortness of Breath. Recommend using at least 4 times a day for the first week. 1 Each 0    rizatriptan (Maxalt) 10 mg tablet Take 10 mg by mouth once as needed for Migraine. May repeat in 2 hours if needed      sertraline (ZOLOFT) 100 mg tablet 100 mg daily as needed. (Patient not taking: Reported on 7/24/2021)          Allergies   Allergen Reactions    Latex Rash    Pork Derived (Porcine) Other (comments)     Because of GI disorder       Review of Systems:  A comprehensive review of systems was negative except for that written in the History of Present Illness. Objective: There were no vitals filed for this visit. Physical Exam:  Physical Exam:  General:  Alert, cooperative, no distress, appears stated age. Orientation she is alert and oriented person place time and situation   Eyes:  Conjunctivae/corneas clear. PERRL, EOMs intact. Fundi benign   Ears:  Normal TMs and external ear canals both ears. Nose: Nares normal. Septum midline. Mucosa normal. No drainage or sinus tenderness. Mouth/Throat: Lips, mucosa, and tongue normal. Teeth and gums normal.   Neck: Supple, symmetrical, trachea midline, no adenopathy, thyroid: no enlargment/tenderness/nodules, no carotid bruit and no JVD. Back:   Symmetric, no curvature. ROM normal. No CVA tenderness. Lungs:   Clear to auscultation bilaterally. Heart:  Regular rate and rhythm, S1, S2 normal, no murmur, click, rub or gallop. Abdomen:   Soft, non-tender. Bowel sounds normal. No masses,  No organomegaly.        No lymphadenopathy in all 4 extremities  Alignment-her overall alignment of her left ankle is satisfactory  Range of motion-she has some limited range of motion with only about 10 degrees of dorsiflexion and 20 degrees of plantarflexion of her left ankle  Vascular-distal pulses palpable in left lower extremity  Sensory/motor-deep tendon reflexes normal left lower extremity. Motor and sensory function intact. Stability-she is rather uncomfortable so is little bit difficult to really assess stability at the fracture  Tenderness to palpation throughout the left lateral malleolus area  Skin-surgical incision has completely healed now  Gait-she does walk with a mild antalgic gait left lower extremity    Assessment:     Hospital Problems  Date Reviewed: 11/23/2021    None        Left fibular nonunion    Xrays and or studies:    [id number] left lateral malleolus fracture nonunion, findings-3 views of the left ankle shows the left lateral malleolus shows no evidence of healing. This does appear to be an established nonunion with a fracture gap of about 4 mm. Her ankle mortise to me does show a little bit of lateral translation of her talus but this is very minimal and appears to be stable throughout her postoperative films impression-established nonunion left lateral malleolus  Plan:     I have spoke with her regarding different treatment options. I try to make sure she understands once again the same thing I told her before which is the fact that I think with the gold standard for this which would be plate fixation and bone grafting then she would be at risk for her infection recurring. That will be my most significant concern. I am also concerned about whether or not this would help her with her symptoms or not. I do think it is something that seems to continue to bother her so I have tried to leave it a little bit up to her as far as whether or not she thinks it is bothering her enough that it would have risk of recurrence of her infection and if that happened and she likely would be back here with a nonunion that we just leave alone if that were to happen a second time. She seems understand this.   Another option of thought of is just trying to treat this with an intramedullary device. 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  The obvious advantage of this is we would not do a formal bone grafting at the nonunion site. The disadvantage obviously is that this may not be something that really gets her fracture to heal and she may continue to have pain and need a bone graft subsequent to that. She is still concerned about the risk of infection however and I think she likes the idea of just trying the intramedullary device as this sort of seems like a little bit of a middle ground between doing nothing and treating this with formal plate fixation and bone grafting. I do not think that is unreasonable.   So the overall surgical plan would be to treat the lateral malleolus nonunion with intramedullary linda or screw fixation as an outpatient and were going to try to get this done in the next few weeks for her    Signed By: Leda Dumont MD     January 13, 2022

## 2022-01-15 ENCOUNTER — HOSPITAL ENCOUNTER (EMERGENCY)
Age: 22
Discharge: HOME | End: 2022-01-15
Attending: EMERGENCY MEDICINE
Payer: COMMERCIAL

## 2022-01-15 ENCOUNTER — APPOINTMENT (OUTPATIENT)
Dept: GENERAL RADIOLOGY | Age: 22
End: 2022-01-15
Attending: EMERGENCY MEDICINE
Payer: COMMERCIAL

## 2022-01-15 VITALS
WEIGHT: 125 LBS | DIASTOLIC BLOOD PRESSURE: 76 MMHG | OXYGEN SATURATION: 98 % | HEART RATE: 98 BPM | SYSTOLIC BLOOD PRESSURE: 112 MMHG | TEMPERATURE: 98.3 F | HEIGHT: 62 IN | RESPIRATION RATE: 18 BRPM | BODY MASS INDEX: 23 KG/M2

## 2022-01-15 DIAGNOSIS — J06.9 UPPER RESPIRATORY TRACT INFECTION, UNSPECIFIED TYPE: ICD-10-CM

## 2022-01-15 DIAGNOSIS — B09 VIRAL EXANTHEM: Primary | ICD-10-CM

## 2022-01-15 LAB
COVID-19 RAPID TEST, COVR: NOT DETECTED
SOURCE, COVRS: NORMAL

## 2022-01-15 PROCEDURE — 87635 SARS-COV-2 COVID-19 AMP PRB: CPT

## 2022-01-15 PROCEDURE — 71046 X-RAY EXAM CHEST 2 VIEWS: CPT

## 2022-01-15 PROCEDURE — 99283 EMERGENCY DEPT VISIT LOW MDM: CPT

## 2022-01-15 PROCEDURE — 74011636637 HC RX REV CODE- 636/637: Performed by: EMERGENCY MEDICINE

## 2022-01-15 RX ORDER — PREDNISONE 20 MG/1
20 TABLET ORAL 2 TIMES DAILY
COMMUNITY
Start: 2022-01-11 | End: 2022-01-16

## 2022-01-15 RX ORDER — PREDNISONE 20 MG/1
40 TABLET ORAL DAILY
Qty: 10 TABLET | Refills: 0 | Status: SHIPPED | OUTPATIENT
Start: 2022-01-15 | End: 2022-01-20

## 2022-01-15 RX ADMIN — PREDNISONE 60 MG: 10 TABLET ORAL at 21:56

## 2022-01-15 NOTE — ED TRIAGE NOTES
Patient advises history of asthma and started with shortness of breath a couple days ago with generalized body aches and chills. Patient advises using inhaler and nebulizer at home with relief for short time then return. Patient then advises rash present to face this morning when she woke. Patient advises history of MRSA and had blood work performed for presurgical stuff. CRP was elevated.

## 2022-01-16 NOTE — ED PROVIDER NOTES
Patient presents emerged part of her evaluation with upper respiratory infection symptoms for the past few days. She denies any fever or chills but does report some congestion and increased wheezing. She also has a history of asthma. She noted a rash that began today involving the face and then spread into the upper chest.  She states it is mildly painful but not pruritic. She denies prior occurrence. Review of systems otherwise negative. On recent blood testing she was noted to have elevated CRP. The history is provided by the patient and medical records. Rash   This is a new problem. The current episode started 6 to 12 hours ago. The problem has been gradually worsening. There has been no fever. The rash is present on the face and chest. The pain is at a severity of 7/10. The pain is moderate. The pain has been constant since onset. Associated symptoms include pain. Pertinent negatives include no blisters, no itching, no weeping and no hives. She has tried nothing for the symptoms. The treatment provided no relief. Past Medical History:   Diagnosis Date    Asthma     Eczema     Esophagitis, eosinophilic     Gastrointestinal disorder     esophageal disorder    Other ill-defined conditions(799.89)     eczema    León-Parkinson-White syndrome        Past Surgical History:   Procedure Laterality Date    HX APPENDECTOMY  2016    HX CHOLECYSTECTOMY  2016    HX ORTHOPAEDIC Left 04/19/2021    ORIF of Bimalleolar ankle fracture         No family history on file.     Social History     Socioeconomic History    Marital status: SINGLE     Spouse name: Not on file    Number of children: Not on file    Years of education: Not on file    Highest education level: Not on file   Occupational History    Not on file   Tobacco Use    Smoking status: Former Smoker    Smokeless tobacco: Never Used   Vaping Use    Vaping Use: Never used   Substance and Sexual Activity    Alcohol use: No    Drug use: Yes     Types: Marijuana    Sexual activity: Never   Other Topics Concern    Not on file   Social History Narrative    Not on file     Social Determinants of Health     Financial Resource Strain:     Difficulty of Paying Living Expenses: Not on file   Food Insecurity:     Worried About Running Out of Food in the Last Year: Not on file    Mei of Food in the Last Year: Not on file   Transportation Needs:     Lack of Transportation (Medical): Not on file    Lack of Transportation (Non-Medical): Not on file   Physical Activity:     Days of Exercise per Week: Not on file    Minutes of Exercise per Session: Not on file   Stress:     Feeling of Stress : Not on file   Social Connections:     Frequency of Communication with Friends and Family: Not on file    Frequency of Social Gatherings with Friends and Family: Not on file    Attends Christian Services: Not on file    Active Member of [address] [place] [address] [employer] or Organizations: Not on file    Attends Club or Organization Meetings: Not on file    Marital Status: Not on file   Intimate Partner Violence:     Fear of Current or Ex-Partner: Not on file    Emotionally Abused: Not on file    Physically Abused: Not on file    Sexually Abused: Not on file   Housing Stability:     Unable to Pay for Housing in the Last Year: Not on file    Number of [place] in the Last Year: Not on file    Unstable Housing in the Last Year: Not on file         ALLERGIES: Latex and Pork derived (porcine)    Review of Systems   Constitutional: Negative for chills and fever. HENT: Positive for congestion and facial swelling. Respiratory: Positive for wheezing. Negative for cough, chest tightness and shortness of breath. Skin: Positive for rash. Negative for itching. All other systems reviewed and are negative.       Vitals:    01/15/22 1740   BP: 108/74   Pulse: (!) 106   Resp: 16   Temp: 98.1 °F (36.7 °C)   SpO2: 98%   Weight: 56.7 kg (125 lb)   Height: 5' 2\" (1.575 m) Physical Exam  Vitals and nursing note reviewed. Constitutional:       General: She is not in acute distress. Appearance: Normal appearance. She is not ill-appearing, toxic-appearing or diaphoretic. HENT:      Head: Normocephalic and atraumatic. Nose: Congestion present. Mouth/Throat:      Mouth: Mucous membranes are moist.      Pharynx: Oropharynx is clear. Eyes:      Extraocular Movements: Extraocular movements intact. Conjunctiva/sclera: Conjunctivae normal.      Pupils: Pupils are equal, round, and reactive to light. Cardiovascular:      Rate and Rhythm: Normal rate and regular rhythm. Pulses: Normal pulses. Pulmonary:      Effort: Pulmonary effort is normal.      Breath sounds: Wheezing present. Comments: Very mild end expiratory wheeze noted  Musculoskeletal:      Cervical back: Normal range of motion and neck supple. No rigidity or tenderness. Lymphadenopathy:      Cervical: No cervical adenopathy. Skin:     General: Skin is warm and dry. Findings: Rash present. Comments: There is a mildly erythematous rash confluent noted over the bilateral cheeks and nose. Also more macular in nature on the upper chest.   Neurological:      General: No focal deficit present. Mental Status: She is alert and oriented to person, place, and time. Mental status is at baseline. Psychiatric:         Mood and Affect: Mood normal.         Behavior: Behavior normal.         Thought Content:  Thought content normal.          MDM  Number of Diagnoses or Management Options     Amount and/or Complexity of Data Reviewed  Clinical lab tests: ordered and reviewed  Tests in the radiology section of CPT®: ordered and reviewed    Risk of Complications, Morbidity, and/or Mortality  Presenting problems: low  Diagnostic procedures: low  Management options: low    Patient Progress  Patient progress: stable         Procedures

## 2022-01-16 NOTE — ED NOTES
I have reviewed discharge instructions with the patient. The patient verbalized understanding. Patient left ED via Discharge Method: ambulatory to Home with self. Opportunity for questions and clarification provided. Patient given 1 scripts. To continue your aftercare when you leave the hospital, you may receive an automated call from our care team to check in on how you are doing. This is a free service and part of our promise to provide the best care and service to meet your aftercare needs.  If you have questions, or wish to unsubscribe from this service please call [phone]. Thank you for Choosing our [facility] Emergency Department.

## 2022-02-01 ENCOUNTER — ANESTHESIA EVENT (OUTPATIENT)
Dept: SURGERY | Age: 22
End: 2022-02-01
Payer: COMMERCIAL

## 2022-02-02 ENCOUNTER — APPOINTMENT (OUTPATIENT)
Dept: GENERAL RADIOLOGY | Age: 22
End: 2022-02-02
Attending: ORTHOPAEDIC SURGERY
Payer: COMMERCIAL

## 2022-02-02 ENCOUNTER — ANESTHESIA (OUTPATIENT)
Dept: SURGERY | Age: 22
End: 2022-02-02
Payer: COMMERCIAL

## 2022-02-02 ENCOUNTER — HOSPITAL ENCOUNTER (OUTPATIENT)
Age: 22
Setting detail: OUTPATIENT SURGERY
Discharge: HOME | End: 2022-02-02
Attending: ORTHOPAEDIC SURGERY | Admitting: ORTHOPAEDIC SURGERY
Payer: COMMERCIAL

## 2022-02-02 VITALS
WEIGHT: 133 LBS | HEART RATE: 84 BPM | TEMPERATURE: 98.7 F | OXYGEN SATURATION: 99 % | HEIGHT: 62 IN | BODY MASS INDEX: 24.48 KG/M2 | SYSTOLIC BLOOD PRESSURE: 104 MMHG | DIASTOLIC BLOOD PRESSURE: 68 MMHG | RESPIRATION RATE: 16 BRPM

## 2022-02-02 DIAGNOSIS — S82.62XK CLOSED DISPLACED FRACTURE OF LATERAL MALLEOLUS OF LEFT FIBULA WITH NONUNION, SUBSEQUENT ENCOUNTER: Primary | ICD-10-CM

## 2022-02-02 LAB
ABO + RH BLD: NORMAL
BLOOD GROUP ANTIBODIES SERPL: NORMAL
HCG UR QL: NEGATIVE
HGB BLD-MCNC: 12.9 G/DL (ref 11.7–15.4)
SPECIMEN EXP DATE BLD: NORMAL

## 2022-02-02 PROCEDURE — C1769 GUIDE WIRE: HCPCS | Performed by: ORTHOPAEDIC SURGERY

## 2022-02-02 PROCEDURE — 77030008462 HC STPLR SKN PROX J&J -A: Performed by: ORTHOPAEDIC SURGERY

## 2022-02-02 PROCEDURE — 85018 HEMOGLOBIN: CPT

## 2022-02-02 PROCEDURE — 74011250637 HC RX REV CODE- 250/637: Performed by: ORTHOPAEDIC SURGERY

## 2022-02-02 PROCEDURE — 77030035614 HC BIT DRL IM FIB ROD ACMD -E: Performed by: ORTHOPAEDIC SURGERY

## 2022-02-02 PROCEDURE — 86900 BLOOD TYPING SEROLOGIC ABO: CPT

## 2022-02-02 PROCEDURE — 76010000160 HC OR TIME 0.5 TO 1 HR INTENSV-TIER 1: Performed by: ORTHOPAEDIC SURGERY

## 2022-02-02 PROCEDURE — 74011250637 HC RX REV CODE- 250/637: Performed by: ANESTHESIOLOGY

## 2022-02-02 PROCEDURE — 76942 ECHO GUIDE FOR BIOPSY: CPT | Performed by: ORTHOPAEDIC SURGERY

## 2022-02-02 PROCEDURE — 76060000033 HC ANESTHESIA 1 TO 1.5 HR: Performed by: ORTHOPAEDIC SURGERY

## 2022-02-02 PROCEDURE — 74011000250 HC RX REV CODE- 250: Performed by: NURSE ANESTHETIST, CERTIFIED REGISTERED

## 2022-02-02 PROCEDURE — 81025 URINE PREGNANCY TEST: CPT

## 2022-02-02 PROCEDURE — 2709999900 HC NON-CHARGEABLE SUPPLY: Performed by: ORTHOPAEDIC SURGERY

## 2022-02-02 PROCEDURE — 74011250636 HC RX REV CODE- 250/636: Performed by: ANESTHESIOLOGY

## 2022-02-02 PROCEDURE — 76210000021 HC REC RM PH II 0.5 TO 1 HR: Performed by: ORTHOPAEDIC SURGERY

## 2022-02-02 PROCEDURE — 77030017016 HC DSG ANTIMIC BARR2 S&N -B: Performed by: ORTHOPAEDIC SURGERY

## 2022-02-02 PROCEDURE — 76010010054 HC POST OP PAIN BLOCK: Performed by: ORTHOPAEDIC SURGERY

## 2022-02-02 PROCEDURE — 77030040922 HC BLNKT HYPOTHRM STRY -A: Performed by: ANESTHESIOLOGY

## 2022-02-02 PROCEDURE — 74011250636 HC RX REV CODE- 250/636: Performed by: ORTHOPAEDIC SURGERY

## 2022-02-02 PROCEDURE — C1713 ANCHOR/SCREW BN/BN,TIS/BN: HCPCS | Performed by: ORTHOPAEDIC SURGERY

## 2022-02-02 PROCEDURE — 77030010509 HC AIRWY LMA MSK TELE -A: Performed by: ANESTHESIOLOGY

## 2022-02-02 PROCEDURE — 76010010054 HC POST OP PAIN BLOCK

## 2022-02-02 PROCEDURE — 74011250636 HC RX REV CODE- 250/636: Performed by: NURSE ANESTHETIST, CERTIFIED REGISTERED

## 2022-02-02 PROCEDURE — 76210000006 HC OR PH I REC 0.5 TO 1 HR: Performed by: ORTHOPAEDIC SURGERY

## 2022-02-02 PROCEDURE — 73610 X-RAY EXAM OF ANKLE: CPT

## 2022-02-02 PROCEDURE — 73600 X-RAY EXAM OF ANKLE: CPT

## 2022-02-02 PROCEDURE — 77030034760 HC NDL BN MAR ASPIR JAMSH STRY -B: Performed by: ORTHOPAEDIC SURGERY

## 2022-02-02 RX ORDER — MIDAZOLAM HYDROCHLORIDE 1 MG/ML
2 INJECTION, SOLUTION INTRAMUSCULAR; INTRAVENOUS
Status: DISCONTINUED | OUTPATIENT
Start: 2022-02-02 | End: 2022-02-02 | Stop reason: HOSPADM

## 2022-02-02 RX ORDER — HALOPERIDOL 5 MG/ML
1 INJECTION INTRAMUSCULAR
Status: DISCONTINUED | OUTPATIENT
Start: 2022-02-02 | End: 2022-02-03 | Stop reason: HOSPADM

## 2022-02-02 RX ORDER — DIPHENHYDRAMINE HYDROCHLORIDE 50 MG/ML
12.5 INJECTION, SOLUTION INTRAMUSCULAR; INTRAVENOUS
Status: DISCONTINUED | OUTPATIENT
Start: 2022-02-02 | End: 2022-02-03 | Stop reason: HOSPADM

## 2022-02-02 RX ORDER — DEXAMETHASONE SODIUM PHOSPHATE 4 MG/ML
INJECTION, SOLUTION INTRA-ARTICULAR; INTRALESIONAL; INTRAMUSCULAR; INTRAVENOUS; SOFT TISSUE
Status: DISCONTINUED | OUTPATIENT
Start: 2022-02-02 | End: 2022-02-02

## 2022-02-02 RX ORDER — LIDOCAINE HYDROCHLORIDE 20 MG/ML
INJECTION, SOLUTION EPIDURAL; INFILTRATION; INTRACAUDAL; PERINEURAL AS NEEDED
Status: DISCONTINUED | OUTPATIENT
Start: 2022-02-02 | End: 2022-02-02 | Stop reason: HOSPADM

## 2022-02-02 RX ORDER — OXYCODONE HYDROCHLORIDE 5 MG/1
5 TABLET ORAL
Status: DISCONTINUED | OUTPATIENT
Start: 2022-02-02 | End: 2022-02-03 | Stop reason: HOSPADM

## 2022-02-02 RX ORDER — OXYCODONE AND ACETAMINOPHEN 5; 325 MG/1; MG/1
2 TABLET ORAL
Qty: 60 TABLET | Refills: 0 | Status: SHIPPED | OUTPATIENT
Start: 2022-02-02 | End: 2022-02-07

## 2022-02-02 RX ORDER — LIDOCAINE HYDROCHLORIDE 10 MG/ML
0.1 INJECTION INFILTRATION; PERINEURAL AS NEEDED
Status: DISCONTINUED | OUTPATIENT
Start: 2022-02-02 | End: 2022-02-02 | Stop reason: HOSPADM

## 2022-02-02 RX ORDER — HYDROMORPHONE HYDROCHLORIDE 2 MG/ML
0.5 INJECTION, SOLUTION INTRAMUSCULAR; INTRAVENOUS; SUBCUTANEOUS
Status: DISCONTINUED | OUTPATIENT
Start: 2022-02-02 | End: 2022-02-03 | Stop reason: HOSPADM

## 2022-02-02 RX ORDER — SODIUM CHLORIDE, SODIUM LACTATE, POTASSIUM CHLORIDE, CALCIUM CHLORIDE 600; 310; 30; 20 MG/100ML; MG/100ML; MG/100ML; MG/100ML
75 INJECTION, SOLUTION INTRAVENOUS CONTINUOUS
Status: DISCONTINUED | OUTPATIENT
Start: 2022-02-02 | End: 2022-02-03 | Stop reason: HOSPADM

## 2022-02-02 RX ORDER — DEXAMETHASONE SODIUM PHOSPHATE 4 MG/ML
INJECTION, SOLUTION INTRA-ARTICULAR; INTRALESIONAL; INTRAMUSCULAR; INTRAVENOUS; SOFT TISSUE AS NEEDED
Status: DISCONTINUED | OUTPATIENT
Start: 2022-02-02 | End: 2022-02-02 | Stop reason: HOSPADM

## 2022-02-02 RX ORDER — SODIUM CHLORIDE, SODIUM LACTATE, POTASSIUM CHLORIDE, CALCIUM CHLORIDE 600; 310; 30; 20 MG/100ML; MG/100ML; MG/100ML; MG/100ML
100 INJECTION, SOLUTION INTRAVENOUS CONTINUOUS
Status: DISCONTINUED | OUTPATIENT
Start: 2022-02-02 | End: 2022-02-02 | Stop reason: HOSPADM

## 2022-02-02 RX ORDER — CEFAZOLIN SODIUM/WATER 2 G/20 ML
2 SYRINGE (ML) INTRAVENOUS ONCE
Status: COMPLETED | OUTPATIENT
Start: 2022-02-02 | End: 2022-02-02

## 2022-02-02 RX ORDER — PROPOFOL 10 MG/ML
INJECTION, EMULSION INTRAVENOUS AS NEEDED
Status: DISCONTINUED | OUTPATIENT
Start: 2022-02-02 | End: 2022-02-02 | Stop reason: HOSPADM

## 2022-02-02 RX ORDER — NALOXONE HYDROCHLORIDE 0.4 MG/ML
0.1 INJECTION, SOLUTION INTRAMUSCULAR; INTRAVENOUS; SUBCUTANEOUS
Status: DISCONTINUED | OUTPATIENT
Start: 2022-02-02 | End: 2022-02-03 | Stop reason: HOSPADM

## 2022-02-02 RX ORDER — FLUMAZENIL 0.1 MG/ML
0.2 INJECTION INTRAVENOUS
Status: DISCONTINUED | OUTPATIENT
Start: 2022-02-02 | End: 2022-02-03 | Stop reason: HOSPADM

## 2022-02-02 RX ORDER — MIDAZOLAM HYDROCHLORIDE 1 MG/ML
2 INJECTION, SOLUTION INTRAMUSCULAR; INTRAVENOUS
Status: COMPLETED | OUTPATIENT
Start: 2022-02-02 | End: 2022-02-02

## 2022-02-02 RX ORDER — ONDANSETRON 2 MG/ML
INJECTION INTRAMUSCULAR; INTRAVENOUS AS NEEDED
Status: DISCONTINUED | OUTPATIENT
Start: 2022-02-02 | End: 2022-02-02 | Stop reason: HOSPADM

## 2022-02-02 RX ORDER — FENTANYL CITRATE 50 UG/ML
100 INJECTION, SOLUTION INTRAMUSCULAR; INTRAVENOUS
Status: COMPLETED | OUTPATIENT
Start: 2022-02-02 | End: 2022-02-02

## 2022-02-02 RX ORDER — ACETAMINOPHEN 500 MG
1000 TABLET ORAL ONCE
Status: COMPLETED | OUTPATIENT
Start: 2022-02-02 | End: 2022-02-02

## 2022-02-02 RX ADMIN — PROPOFOL 200 MG: 10 INJECTION, EMULSION INTRAVENOUS at 08:53

## 2022-02-02 RX ADMIN — Medication 3 AMPULE: at 06:53

## 2022-02-02 RX ADMIN — MIDAZOLAM 2 MG: 1 INJECTION INTRAMUSCULAR; INTRAVENOUS at 07:31

## 2022-02-02 RX ADMIN — ROPIVACAINE HYDROCHLORIDE 30 ML: 5 INJECTION, SOLUTION EPIDURAL; INFILTRATION; PERINEURAL at 07:38

## 2022-02-02 RX ADMIN — ACETAMINOPHEN 1000 MG: 500 TABLET ORAL at 06:53

## 2022-02-02 RX ADMIN — ROPIVACAINE HYDROCHLORIDE 20 ML: 5 INJECTION, SOLUTION EPIDURAL; INFILTRATION; PERINEURAL at 07:42

## 2022-02-02 RX ADMIN — ONDANSETRON 4 MG: 2 INJECTION INTRAMUSCULAR; INTRAVENOUS at 09:32

## 2022-02-02 RX ADMIN — DEXAMETHASONE SODIUM PHOSPHATE 4 MG: 4 INJECTION, SOLUTION INTRAMUSCULAR; INTRAVENOUS at 09:32

## 2022-02-02 RX ADMIN — Medication 2 G: at 08:57

## 2022-02-02 RX ADMIN — SODIUM CHLORIDE, SODIUM LACTATE, POTASSIUM CHLORIDE, AND CALCIUM CHLORIDE 100 ML/HR: 600; 310; 30; 20 INJECTION, SOLUTION INTRAVENOUS at 06:52

## 2022-02-02 RX ADMIN — LIDOCAINE HYDROCHLORIDE 60 MG: 20 INJECTION, SOLUTION EPIDURAL; INFILTRATION; INTRACAUDAL; PERINEURAL at 08:53

## 2022-02-02 RX ADMIN — SODIUM CHLORIDE, SODIUM LACTATE, POTASSIUM CHLORIDE, AND CALCIUM CHLORIDE: 600; 310; 30; 20 INJECTION, SOLUTION INTRAVENOUS at 09:28

## 2022-02-02 RX ADMIN — FENTANYL CITRATE 100 MCG: 50 INJECTION, SOLUTION INTRAMUSCULAR; INTRAVENOUS at 07:31

## 2022-02-02 NOTE — INTERVAL H&P NOTE
Update History & Physical  The Patient's History and Physical of 1/13/2022 was reviewed with the patient and I examined the patient. There was no change. The surgical site was confirmed by the patient and me. Plan:  The risk, benefits, expected outcome, and alternative to the recommended procedure have been discussed with the patient. Patient understands and wants to proceed with repair nonunion left lateral malleolus with intramedullary nail versus screw fixation.     Electronically signed by Virginia Mehta MD on 2/2/2022 at 7:53 AM

## 2022-02-02 NOTE — ANESTHESIA PROCEDURE NOTES
Peripheral Block    Start time: 2/2/2022 7:36 AM  End time: 2/2/2022 7:38 AM  Performed by: Joycelyn Bryant MD  Authorized by: Joycelyn Bryant MD       Pre-procedure: Indications: at surgeon's request and post-op pain management    Preanesthetic Checklist: patient identified, risks and benefits discussed, site marked, timeout performed, anesthesia consent given and patient being monitored    Timeout Time: 07:31 EST          Block Type:   Block Type:  Popliteal  Laterality:  Left  Monitoring:  Standard ASA monitoring, continuous pulse ox, frequent vital sign checks, heart rate, responsive to questions and oxygen  Injection Technique:  Single shot  Procedures: ultrasound guided and nerve stimulator    Patient Position: supine  Prep: chlorhexidine    Location:  Lower thigh  Needle Type:  Stimuplex  Needle Gauge:  22 G  Needle Localization:  Anatomical landmarks, ultrasound guidance and nerve stimulator  Motor Response comment:   Motor Response: minimal motor response >0.4 mA   Medication Injected:  Ropivacaine 0.5% with epinephrine 1:200,000 injection, 30 mL (Mixture components: EPINEPHrine HCl (PF) 1 mg/mL (1 mL) Soln, . 005 mL; ropivacaine (PF) 5 mg/mL (0.5 %) Soln, 1 mL)  Med Admin Time: 2/2/2022 7:38 AM    Assessment:  Number of attempts:  1  Injection Assessment:  Incremental injection every 5 mL, local visualized surrounding nerve on ultrasound, negative aspiration for CSF, negative aspiration for blood, no paresthesia, no intravascular symptoms and ultrasound image on chart  Patient tolerance:  Patient tolerated the procedure well with no immediate complications

## 2022-02-02 NOTE — ANESTHESIA PREPROCEDURE EVALUATION
Relevant Problems   No relevant active problems       Anesthetic History               Review of Systems / Medical History  Patient summary reviewed and pertinent labs reviewed    Pulmonary            Asthma (occas inhaler, rare symptoms)        Neuro/Psych   Within defined limits           Cardiovascular            Dysrhythmias (wpw with svt, last episode 2 years ago.)       Exercise tolerance: >4 METS     GI/Hepatic/Renal     GERD: well controlled          Comments: Eosinophilic Esophagitis well controlled and asymptomatic without treatment in last year Endo/Other  Within defined limits           Other Findings              Physical Exam    Airway  Mallampati: II  TM Distance: 4 - 6 cm  Neck ROM: normal range of motion   Mouth opening: Normal     Cardiovascular  Regular rate and rhythm,  S1 and S2 normal,  no murmur, click, rub, or gallop  Rhythm: regular  Rate: normal         Dental  No notable dental hx       Pulmonary  Breath sounds clear to auscultation               Abdominal         Other Findings            Anesthetic Plan    ASA: 2  Anesthesia type: general      Post-op pain plan if not by surgeon: peripheral nerve block single    Induction: Intravenous  Anesthetic plan and risks discussed with: Patient and Mother

## 2022-02-02 NOTE — PROGRESS NOTES
I have reviewed the patient's history of controlled substance prescriptions in the [facility] prescription drug monitoring program.

## 2022-02-02 NOTE — ANESTHESIA POSTPROCEDURE EVALUATION
Procedure(s):  REPAIR NON UNION LEFT  FIBULA WITH IM NAIL CHOICE ANES. general    Anesthesia Post Evaluation      Multimodal analgesia: multimodal analgesia used between 6 hours prior to anesthesia start to PACU discharge  Patient location during evaluation: bedside  Patient participation: complete - patient participated  Level of consciousness: awake  Pain management: adequate  Airway patency: patent  Anesthetic complications: no  Cardiovascular status: acceptable  Respiratory status: spontaneous ventilation and acceptable  Hydration status: acceptable  Post anesthesia nausea and vomiting:  none      INITIAL Post-op Vital signs:   Vitals Value Taken Time   /68 02/02/22 1051   Temp 37.1 °C (98.7 °F) 02/02/22 1022   Pulse 80 02/02/22 1056   Resp 16 02/02/22 1051   SpO2 97 % 02/02/22 1056   Vitals shown include unvalidated device data.

## 2022-02-02 NOTE — ANESTHESIA PROCEDURE NOTES
Peripheral Block    Start time: 2/2/2022 7:40 AM  End time: 2/2/2022 7:42 AM  Performed by: Florida Ott MD  Authorized by: Florida Ott MD       Pre-procedure: Indications: at surgeon's request and post-op pain management    Preanesthetic Checklist: patient identified, risks and benefits discussed, site marked, timeout performed, anesthesia consent given and patient being monitored    Timeout Time: 07:39 EST          Block Type:   Block Type: Adductor canal  Laterality:  Left  Monitoring:  Standard ASA monitoring, continuous pulse ox, frequent vital sign checks, heart rate, responsive to questions and oxygen  Injection Technique:  Single shot  Procedures: ultrasound guided    Patient Position: supine  Prep: chlorhexidine    Location:  Mid thigh  Needle Type:  Stimuplex  Needle Gauge:  22 G  Needle Localization:  Ultrasound guidance  Medication Injected:  Ropivacaine 0.5% with epinephrine 1:200,000 injection, 20 mL (Mixture components: EPINEPHrine HCl (PF) 1 mg/mL (1 mL) Soln, . 005 mL; ropivacaine (PF) 5 mg/mL (0.5 %) Soln, 1 mL)  Med Admin Time: 2/2/2022 7:42 AM    Assessment:  Number of attempts:  1  Injection Assessment:  Incremental injection every 5 mL, local visualized surrounding nerve on ultrasound, negative aspiration for CSF, negative aspiration for blood, no paresthesia, no intravascular symptoms and ultrasound image on chart  Patient tolerance:  Patient tolerated the procedure well with no immediate complications

## 2022-02-02 NOTE — OP NOTES
Operative Report    Patient: Michelle Berry MRN: [id number]  SSN: [id number]    YOB: 2000  Age: 24 y.o. Sex: female       Date of Surgery: 2/2/2022     History:  Michelle Berry is a 24 y.o. female who is now many months out from a left lateral malleolus fracture. This was recently associated with significant widening of her ankle mortise. This was originally treated with plate and screw fixation. Unfortunately she developed a postoperative infection and ended up with her plate being taken out. 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 Her ankle mortise itself has continue to be very well reduced and has really not shown any evidence of instability throughout her postoperative course. I have tried multiple times just to convince her that this is very reasonable just to leave this as I am concerned that going back and trying to treat her with plate and screw fixation again and bone grafting this would put her at significant risk for developing an infection. Especially in light of the fact that her skin continues to look so bad she basically just has these little areas of what appears to be folliculitis all over her left lower extremity and for this reason I did not feel like it was a very good idea to go back to the operating room and perform a formal plate fixation and bone grafting. I had hoped that even though she had this nonunion in her fibula with the fact that her syndesmosis was well reduced and her mortise showed no evidence of any widening we could just treat this much like we would an osteotomy and just leave this alone. However she continued to come back saying she had ankle pain and she continued to localizes over the lateral malleolus itself. As result I called her about revision fixation of this. She seemed understand that there was a significant risk for reinfection.  I thought that it might be reasonable to try a intramedullary nail device in her lateral malleolus. 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 After talking her about this she seemed to feel comfortable consenting. .      I talked to the patient and/or their representative and explained the exact nature the procedure. I also went through a detailed list of the material risks associated with  the procedure which included risk of bleeding, infection, injury to nearby structures, worsening the situation, as well as the risks associate with anesthesia and finally death. Also talked with him regarding the benefits and alternatives to the procedure. Preoperative Diagnosis: Closed nondisplaced comminuted fracture of shaft of left fibula, initial encounter [S82.786A]     Postoperative Diagnosis: Closed nondisplaced comminuted fracture of shaft of left fibula, initial encounter [S82.361A]     Surgeon(s) and Role:     * Kelly Cortez MD - Primary    Anesthesia: General     Procedure: Procedure(s):  REPAIR NON UNION LEFT  FIBULA WITH intramedullary nail fixation    Procedure in Detail: After successful duction of general anesthetic as well as regional block for postoperative analgesia the left lower extremity was prepped and draped in usual sterile fashion. I then made a small incision distally and placed first the 3.1 mm reamer from the Acumed fibular nail set into the tip of the lateral malleolus. I then advanced this to the area near the nonunion site and I placed a small bend in the reamer. With great care I gradually advanced this and was able to place this across the nonunion site into the proximal fragment and then once I had done this I used the reamer to basically debride the area near the nonunion site to try to hopefully initiate healing response.  I used also the larger reamer which was the 3.8 mm reamer as well and once I had placed this around the nonunion site as well as into the shaft of the fibula make sure that we will except the larger nail I then placed a 3.6 x 190 mm Acumed fibular nail into the lateral malleolus. Once this was in place I then had before placement of the nail made a small incision of the anterior aspect of the nonunion site and done 2 things with a bone biopsy needle I used the bone biopsy needle to basically place this into the nonunion site under fluoroscopic guidance and debride this area as well as to harvest a small amount of cancellous bone graft in the distal tibia. I then used a small cannula to place this into the nonunion site before I placed the nail. Once the nail was in appropriate position on both the AP and lateral projection I made sure that I was satisfied with its position distally as far as with the fact that it was right at the tip of the lateral malleolus and then removed the insertion handle at  my final reduction as well as a place my hardware I was pleased with this. I then closed my 2 incision with staples only dressings were applied as well as a cam walker boot the patient was awakened and taken to room in stable condition      Estimated Blood Loss: 30 cc    Tourniquet Time: * No tourniquets in log *      Implants:   Implant Name Type Inv. Item Serial No.  Lot No. LRB No. Used Action   SMALL BONE INTRAMEDULLARY NASH SYSTEM     [id number] Left 1 Implanted               Specimens: * No specimens in log *        Drains: None                Complications: None    Counts: Sponge and needle counts were correct times two.     Signed By:  Kenroy Bishop MD     February 2, 2022

## 2022-02-15 ENCOUNTER — ANESTHESIA EVENT (OUTPATIENT)
Dept: SURGERY | Age: 22
End: 2022-02-15
Payer: COMMERCIAL

## 2022-02-15 NOTE — H&P (VIEW-ONLY)
History and physical    Patient: Arianna Leroy MRN: [id number]  SSN: [id number]    YOB: 2000  Age: 24 y.o. Sex: female      Subjective:      Arianna Leroy is a 24 y.o. female who is about 2 weeks out from repair nonunion left fibula with intramedullary nail fixation. One of the reasons I chose this method because I was worried about recurrent infection. Unfortunate she is back here today she seems having increasing pain some redness around her left ankle. She does have some redness around her wound. I do think she has had a recurrence of her infection. Past Medical History:   Diagnosis Date    ADHD     Adverse effect of anesthesia 06/2021    chose not to have block pre-op 6/2021- took longer to wake up and woke up screaming in pain and PONV    Anxiety     Asthma     proair prn now- 2/1/22 avg once weekly    Depression     Eczema     Esophagitis, eosinophilic     no current medications/infusions    Gastrointestinal disorder     esophageal disorder- previously on infusions    Hx MRSA infection 06/2021    L ankle    Marijuana smoker 02/01/2022    twice daily per pt    Migraine     PONV (postoperative nausea and vomiting) 06/2021    Suspected sleep apnea     \"I'm supposed to be tested\"    León-Parkinson-White syndrome      Past Surgical History:   Procedure Laterality Date    HX APPENDECTOMY  2016    HX CHOLECYSTECTOMY  2016    HX ENDOSCOPY      HX ORTHOPAEDIC Left 04/19/2021    ORIF of Bimalleolar ankle fracture    HX ORTHOPAEDIC  06/2021    LEFT ANKLE HARDWARE REMOVAL       FAMHX -No history of inflammatory arthritis   Social History     Tobacco Use    Smoking status: Former Smoker    Smokeless tobacco: Never Used   Substance Use Topics    Alcohol use: No      Current Outpatient Medications   Medication Sig Dispense Refill    Lisdexamfetamine (Vyvanse) 60 mg capsule Take 1 Capsule by mouth daily.  Indications: attention deficit disorder with hyperactivity      minocycline (MINOCIN, DYNACIN) 100 mg capsule Take 100 mg by mouth daily. Indications: acne      albuterol (PROVENTIL HFA, VENTOLIN HFA, PROAIR HFA) 90 mcg/actuation inhaler Take 2 Puffs by inhalation every four (4) hours as needed for Wheezing or Shortness of Breath. Recommend using at least 4 times a day for the first week. (Patient not taking: Reported on 2/2/2022) 1 Each 0    rizatriptan (Maxalt) 10 mg tablet Take 10 mg by mouth once as needed for Migraine. May repeat in 2 hours if needed      sertraline (ZOLOFT) 100 mg tablet Take 100 mg by mouth daily as needed. (Patient not taking: Reported on 2/2/2022)          Allergies   Allergen Reactions    Latex Rash    Prednisone Shortness of Breath and Rash    Adhesive Rash    Pork Derived (Porcine) Other (comments)     Because of GI disorder       Review of Systems:  A comprehensive review of systems was negative except for that written in the History of Present Illness. Objective: There were no vitals filed for this visit. Physical Exam:  Physical Exam:  General:  Alert, cooperative, no distress, appears stated age. Orientation alert oriented person place time and situation   Eyes:  Conjunctivae/corneas clear. PERRL, EOMs intact. Fundi benign   Ears:  Normal TMs and external ear canals both ears. Nose: Nares normal. Septum midline. Mucosa normal. No drainage or sinus tenderness. Mouth/Throat: Lips, mucosa, and tongue normal. Teeth and gums normal.   Neck: Supple, symmetrical, trachea midline, no adenopathy, thyroid: no enlargment/tenderness/nodules, no carotid bruit and no JVD. Back:   Symmetric, no curvature. ROM normal. No CVA tenderness. Lungs:   Clear to auscultation bilaterally. Heart:  Regular rate and rhythm, S1, S2 normal, no murmur, click, rub or gallop. Abdomen:   Soft, non-tender. Bowel sounds normal. No masses,  No organomegaly.        No lymphadenopathy in all 4 extremities  Alignmentshe has near normal alignment of her left ankle  Range of motionshe has some mild pain with dorsiflexion plantarflexion of her left ankle  Vasculardistal pulses palpable in left lower extremity  Sensory/motordeep tendon reflexes normal left lower extremity. Motor and sensory function intact. Stabilityno evidence of any obvious instability  Tenderness to palpation over the most distal wound especially  Skinshe has significant redness especially over her distal incision  Gait-she cannot put any weight on her left lower extremity    Assessment:     Hospital Problems  Date Reviewed: 1/13/2022    None        Postoperative wound infection left ankle    Xrays and or studies:    No new x-rays today  Plan:     I think at this point only thing would be to go back to the operating room remove the hardware wash this out sent some cultures she would likely need to be admitted placed on some IV antibiotics and then obviously depending on what her culture show we would either discontinue her IV antibiotics after few days or she may need more long-term IV antibiotic and she might need an infectious disease consult.   So the plan will be to go ahead and proceed with removal of hardware left ankle and debridement of left ankle wound tomorrow in the operating room and she will be admitted after that    Signed By: Anh Boggs MD     February 15, 2022

## 2022-02-16 ENCOUNTER — HOSPITAL ENCOUNTER (OUTPATIENT)
Age: 22
Setting detail: OBSERVATION
Discharge: HOME | End: 2022-02-18
Attending: ORTHOPAEDIC SURGERY | Admitting: ORTHOPAEDIC SURGERY
Payer: COMMERCIAL

## 2022-02-16 ENCOUNTER — APPOINTMENT (OUTPATIENT)
Dept: GENERAL RADIOLOGY | Age: 22
End: 2022-02-16
Attending: ORTHOPAEDIC SURGERY
Payer: COMMERCIAL

## 2022-02-16 ENCOUNTER — ANESTHESIA (OUTPATIENT)
Dept: SURGERY | Age: 22
End: 2022-02-16
Payer: COMMERCIAL

## 2022-02-16 DIAGNOSIS — T81.49XA POSTOPERATIVE WOUND INFECTION: Primary | ICD-10-CM

## 2022-02-16 LAB
ABO + RH BLD: NORMAL
BASOPHILS # BLD: 0 K/UL (ref 0–0.2)
BASOPHILS NFR BLD: 1 % (ref 0–2)
BLOOD GROUP ANTIBODIES SERPL: NORMAL
COVID-19 RAPID TEST, COVR: NOT DETECTED
CREAT SERPL-MCNC: 0.6 MG/DL (ref 0.6–1)
DIFFERENTIAL METHOD BLD: NORMAL
EOSINOPHIL # BLD: 0.2 K/UL (ref 0–0.8)
EOSINOPHIL NFR BLD: 3 % (ref 0.5–7.8)
ERYTHROCYTE [DISTWIDTH] IN BLOOD BY AUTOMATED COUNT: 14.1 % (ref 11.9–14.6)
HCG UR QL: NEGATIVE
HCT VFR BLD AUTO: 42.1 % (ref 35.8–46.3)
HGB BLD-MCNC: 13.7 G/DL (ref 11.7–15.4)
IMM GRANULOCYTES # BLD AUTO: 0 K/UL (ref 0–0.5)
IMM GRANULOCYTES NFR BLD AUTO: 0 % (ref 0–5)
LYMPHOCYTES # BLD: 2.6 K/UL (ref 0.5–4.6)
LYMPHOCYTES NFR BLD: 33 % (ref 13–44)
MCH RBC QN AUTO: 29 PG (ref 26.1–32.9)
MCHC RBC AUTO-ENTMCNC: 32.5 G/DL (ref 31.4–35)
MCV RBC AUTO: 89 FL (ref 79.6–97.8)
MONOCYTES # BLD: 0.6 K/UL (ref 0.1–1.3)
MONOCYTES NFR BLD: 8 % (ref 4–12)
NEUTS SEG # BLD: 4.5 K/UL (ref 1.7–8.2)
NEUTS SEG NFR BLD: 56 % (ref 43–78)
NRBC # BLD: 0 K/UL (ref 0–0.2)
PLATELET # BLD AUTO: 235 K/UL (ref 150–450)
PMV BLD AUTO: 10.8 FL (ref 9.4–12.3)
RBC # BLD AUTO: 4.73 M/UL (ref 4.05–5.2)
SOURCE, COVRS: NORMAL
SPECIMEN EXP DATE BLD: NORMAL
WBC # BLD AUTO: 8 K/UL (ref 4.3–11.1)

## 2022-02-16 PROCEDURE — G0378 HOSPITAL OBSERVATION PER HR: HCPCS

## 2022-02-16 PROCEDURE — 87077 CULTURE AEROBIC IDENTIFY: CPT

## 2022-02-16 PROCEDURE — 86900 BLOOD TYPING SEROLOGIC ABO: CPT

## 2022-02-16 PROCEDURE — 87635 SARS-COV-2 COVID-19 AMP PRB: CPT

## 2022-02-16 PROCEDURE — 2709999900 HC NON-CHARGEABLE SUPPLY: Performed by: ORTHOPAEDIC SURGERY

## 2022-02-16 PROCEDURE — 36415 COLL VENOUS BLD VENIPUNCTURE: CPT

## 2022-02-16 PROCEDURE — 74011000250 HC RX REV CODE- 250: Performed by: STUDENT IN AN ORGANIZED HEALTH CARE EDUCATION/TRAINING PROGRAM

## 2022-02-16 PROCEDURE — 87205 SMEAR GRAM STAIN: CPT

## 2022-02-16 PROCEDURE — 76942 ECHO GUIDE FOR BIOPSY: CPT | Performed by: ORTHOPAEDIC SURGERY

## 2022-02-16 PROCEDURE — 76010000160 HC OR TIME 0.5 TO 1 HR INTENSV-TIER 1: Performed by: ORTHOPAEDIC SURGERY

## 2022-02-16 PROCEDURE — 85025 COMPLETE CBC W/AUTO DIFF WBC: CPT

## 2022-02-16 PROCEDURE — 74011000250 HC RX REV CODE- 250: Performed by: ORTHOPAEDIC SURGERY

## 2022-02-16 PROCEDURE — 87176 TISSUE HOMOGENIZATION CULTR: CPT

## 2022-02-16 PROCEDURE — 81025 URINE PREGNANCY TEST: CPT

## 2022-02-16 PROCEDURE — 76060000032 HC ANESTHESIA 0.5 TO 1 HR: Performed by: ORTHOPAEDIC SURGERY

## 2022-02-16 PROCEDURE — 77030017016 HC DSG ANTIMIC BARR2 S&N -B: Performed by: ORTHOPAEDIC SURGERY

## 2022-02-16 PROCEDURE — 82565 ASSAY OF CREATININE: CPT

## 2022-02-16 PROCEDURE — 76210000063 HC OR PH I REC FIRST 0.5 HR: Performed by: ORTHOPAEDIC SURGERY

## 2022-02-16 PROCEDURE — 87186 SC STD MICRODIL/AGAR DIL: CPT

## 2022-02-16 PROCEDURE — 74011250636 HC RX REV CODE- 250/636: Performed by: ORTHOPAEDIC SURGERY

## 2022-02-16 PROCEDURE — 77030010509 HC AIRWY LMA MSK TELE -A: Performed by: STUDENT IN AN ORGANIZED HEALTH CARE EDUCATION/TRAINING PROGRAM

## 2022-02-16 PROCEDURE — 76010010054 HC POST OP PAIN BLOCK

## 2022-02-16 PROCEDURE — 74011250636 HC RX REV CODE- 250/636: Performed by: STUDENT IN AN ORGANIZED HEALTH CARE EDUCATION/TRAINING PROGRAM

## 2022-02-16 PROCEDURE — 74011250636 HC RX REV CODE- 250/636: Performed by: ANESTHESIOLOGY

## 2022-02-16 PROCEDURE — 20680 REMOVAL OF IMPLANT DEEP: CPT | Performed by: ORTHOPAEDIC SURGERY

## 2022-02-16 PROCEDURE — 76010010054 HC POST OP PAIN BLOCK: Performed by: ORTHOPAEDIC SURGERY

## 2022-02-16 PROCEDURE — 74011250637 HC RX REV CODE- 250/637: Performed by: ANESTHESIOLOGY

## 2022-02-16 PROCEDURE — 74011250637 HC RX REV CODE- 250/637: Performed by: ORTHOPAEDIC SURGERY

## 2022-02-16 PROCEDURE — 77030040922 HC BLNKT HYPOTHRM STRY -A: Performed by: STUDENT IN AN ORGANIZED HEALTH CARE EDUCATION/TRAINING PROGRAM

## 2022-02-16 PROCEDURE — 87075 CULTR BACTERIA EXCEPT BLOOD: CPT

## 2022-02-16 PROCEDURE — 2709999900 HC NON-CHARGEABLE SUPPLY

## 2022-02-16 PROCEDURE — 77030003602 HC NDL NRV BLK BBMI -B: Performed by: ANESTHESIOLOGY

## 2022-02-16 PROCEDURE — 77030000032 HC CUF TRNQT ZIMM -B: Performed by: ORTHOPAEDIC SURGERY

## 2022-02-16 PROCEDURE — 77030019908 HC STETH ESOPH SIMS -A: Performed by: STUDENT IN AN ORGANIZED HEALTH CARE EDUCATION/TRAINING PROGRAM

## 2022-02-16 RX ORDER — MIDAZOLAM HYDROCHLORIDE 1 MG/ML
2 INJECTION, SOLUTION INTRAMUSCULAR; INTRAVENOUS
Status: COMPLETED | OUTPATIENT
Start: 2022-02-16 | End: 2022-02-16

## 2022-02-16 RX ORDER — ONDANSETRON 4 MG/1
4 TABLET, ORALLY DISINTEGRATING ORAL
Status: DISCONTINUED | OUTPATIENT
Start: 2022-02-16 | End: 2022-02-18

## 2022-02-16 RX ORDER — MIDAZOLAM HYDROCHLORIDE 1 MG/ML
2 INJECTION, SOLUTION INTRAMUSCULAR; INTRAVENOUS ONCE
Status: COMPLETED | OUTPATIENT
Start: 2022-02-16 | End: 2022-02-16

## 2022-02-16 RX ORDER — ONDANSETRON 2 MG/ML
INJECTION INTRAMUSCULAR; INTRAVENOUS AS NEEDED
Status: DISCONTINUED | OUTPATIENT
Start: 2022-02-16 | End: 2022-02-16 | Stop reason: HOSPADM

## 2022-02-16 RX ORDER — PROPOFOL 10 MG/ML
INJECTION, EMULSION INTRAVENOUS AS NEEDED
Status: DISCONTINUED | OUTPATIENT
Start: 2022-02-16 | End: 2022-02-16 | Stop reason: HOSPADM

## 2022-02-16 RX ORDER — NALOXONE HYDROCHLORIDE 0.4 MG/ML
0.2 INJECTION, SOLUTION INTRAMUSCULAR; INTRAVENOUS; SUBCUTANEOUS AS NEEDED
Status: DISCONTINUED | OUTPATIENT
Start: 2022-02-16 | End: 2022-02-16 | Stop reason: HOSPADM

## 2022-02-16 RX ORDER — PROMETHAZINE HYDROCHLORIDE 25 MG/ML
12.5 INJECTION, SOLUTION INTRAMUSCULAR; INTRAVENOUS ONCE
Status: COMPLETED | OUTPATIENT
Start: 2022-02-16 | End: 2022-02-16

## 2022-02-16 RX ORDER — SODIUM CHLORIDE 0.9 % (FLUSH) 0.9 %
5-40 SYRINGE (ML) INJECTION EVERY 8 HOURS
Status: DISCONTINUED | OUTPATIENT
Start: 2022-02-16 | End: 2022-02-16 | Stop reason: HOSPADM

## 2022-02-16 RX ORDER — HYDROMORPHONE HYDROCHLORIDE 2 MG/ML
0.5 INJECTION, SOLUTION INTRAMUSCULAR; INTRAVENOUS; SUBCUTANEOUS
Status: ACTIVE | OUTPATIENT
Start: 2022-02-16 | End: 2022-02-16

## 2022-02-16 RX ORDER — LIDOCAINE HYDROCHLORIDE 20 MG/ML
INJECTION, SOLUTION EPIDURAL; INFILTRATION; INTRACAUDAL; PERINEURAL AS NEEDED
Status: DISCONTINUED | OUTPATIENT
Start: 2022-02-16 | End: 2022-02-16 | Stop reason: HOSPADM

## 2022-02-16 RX ORDER — ACETAMINOPHEN 650 MG/1
650 SUPPOSITORY RECTAL
Status: DISCONTINUED | OUTPATIENT
Start: 2022-02-16 | End: 2022-02-18 | Stop reason: HOSPADM

## 2022-02-16 RX ORDER — LIDOCAINE HYDROCHLORIDE 10 MG/ML
0.1 INJECTION INFILTRATION; PERINEURAL AS NEEDED
Status: DISCONTINUED | OUTPATIENT
Start: 2022-02-16 | End: 2022-02-16 | Stop reason: SDUPTHER

## 2022-02-16 RX ORDER — SODIUM CHLORIDE, SODIUM LACTATE, POTASSIUM CHLORIDE, CALCIUM CHLORIDE 600; 310; 30; 20 MG/100ML; MG/100ML; MG/100ML; MG/100ML
75 INJECTION, SOLUTION INTRAVENOUS CONTINUOUS
Status: DISCONTINUED | OUTPATIENT
Start: 2022-02-16 | End: 2022-02-16 | Stop reason: HOSPADM

## 2022-02-16 RX ORDER — FLUMAZENIL 0.1 MG/ML
0.2 INJECTION INTRAVENOUS
Status: DISCONTINUED | OUTPATIENT
Start: 2022-02-16 | End: 2022-02-16 | Stop reason: HOSPADM

## 2022-02-16 RX ORDER — LIDOCAINE HYDROCHLORIDE 10 MG/ML
0.1 INJECTION INFILTRATION; PERINEURAL AS NEEDED
Status: DISCONTINUED | OUTPATIENT
Start: 2022-02-16 | End: 2022-02-16 | Stop reason: HOSPADM

## 2022-02-16 RX ORDER — ALBUTEROL SULFATE 0.83 MG/ML
2.5 SOLUTION RESPIRATORY (INHALATION)
Status: DISCONTINUED | OUTPATIENT
Start: 2022-02-16 | End: 2022-02-16 | Stop reason: HOSPADM

## 2022-02-16 RX ORDER — HYDROMORPHONE HYDROCHLORIDE 1 MG/ML
0.2 INJECTION, SOLUTION INTRAMUSCULAR; INTRAVENOUS; SUBCUTANEOUS
Status: DISCONTINUED | OUTPATIENT
Start: 2022-02-16 | End: 2022-02-18 | Stop reason: HOSPADM

## 2022-02-16 RX ORDER — SODIUM CHLORIDE 0.9 % (FLUSH) 0.9 %
5-40 SYRINGE (ML) INJECTION AS NEEDED
Status: DISCONTINUED | OUTPATIENT
Start: 2022-02-16 | End: 2022-02-16 | Stop reason: HOSPADM

## 2022-02-16 RX ORDER — OXYCODONE HYDROCHLORIDE 5 MG/1
10 TABLET ORAL
Status: DISCONTINUED | OUTPATIENT
Start: 2022-02-16 | End: 2022-02-18 | Stop reason: HOSPADM

## 2022-02-16 RX ORDER — SODIUM CHLORIDE 0.9 % (FLUSH) 0.9 %
5-40 SYRINGE (ML) INJECTION EVERY 8 HOURS
Status: DISCONTINUED | OUTPATIENT
Start: 2022-02-16 | End: 2022-02-18 | Stop reason: HOSPADM

## 2022-02-16 RX ORDER — ONDANSETRON 2 MG/ML
4 INJECTION INTRAMUSCULAR; INTRAVENOUS
Status: DISCONTINUED | OUTPATIENT
Start: 2022-02-16 | End: 2022-02-18

## 2022-02-16 RX ORDER — ACETAMINOPHEN 325 MG/1
650 TABLET ORAL ONCE
Status: COMPLETED | OUTPATIENT
Start: 2022-02-16 | End: 2022-02-16

## 2022-02-16 RX ORDER — SODIUM CHLORIDE 0.9 % (FLUSH) 0.9 %
5-40 SYRINGE (ML) INJECTION AS NEEDED
Status: DISCONTINUED | OUTPATIENT
Start: 2022-02-16 | End: 2022-02-18 | Stop reason: HOSPADM

## 2022-02-16 RX ORDER — CELECOXIB 200 MG/1
200 CAPSULE ORAL ONCE
Status: COMPLETED | OUTPATIENT
Start: 2022-02-16 | End: 2022-02-16

## 2022-02-16 RX ORDER — DEXAMETHASONE SODIUM PHOSPHATE 4 MG/ML
INJECTION, SOLUTION INTRA-ARTICULAR; INTRALESIONAL; INTRAMUSCULAR; INTRAVENOUS; SOFT TISSUE AS NEEDED
Status: DISCONTINUED | OUTPATIENT
Start: 2022-02-16 | End: 2022-02-16 | Stop reason: HOSPADM

## 2022-02-16 RX ORDER — ACETAMINOPHEN 325 MG/1
650 TABLET ORAL
Status: DISCONTINUED | OUTPATIENT
Start: 2022-02-16 | End: 2022-02-18 | Stop reason: HOSPADM

## 2022-02-16 RX ORDER — ACETAMINOPHEN 325 MG/1
650 TABLET ORAL ONCE
Status: DISCONTINUED | OUTPATIENT
Start: 2022-02-16 | End: 2022-02-16 | Stop reason: SDUPTHER

## 2022-02-16 RX ORDER — POLYETHYLENE GLYCOL 3350 17 G/17G
17 POWDER, FOR SOLUTION ORAL DAILY PRN
Status: DISCONTINUED | OUTPATIENT
Start: 2022-02-16 | End: 2022-02-18 | Stop reason: HOSPADM

## 2022-02-16 RX ORDER — FENTANYL CITRATE 50 UG/ML
100 INJECTION, SOLUTION INTRAMUSCULAR; INTRAVENOUS ONCE
Status: COMPLETED | OUTPATIENT
Start: 2022-02-16 | End: 2022-02-16

## 2022-02-16 RX ORDER — MINOCYCLINE HYDROCHLORIDE 50 MG/1
100 CAPSULE ORAL DAILY
Status: DISCONTINUED | OUTPATIENT
Start: 2022-02-17 | End: 2022-02-18 | Stop reason: HOSPADM

## 2022-02-16 RX ORDER — SODIUM CHLORIDE, SODIUM LACTATE, POTASSIUM CHLORIDE, CALCIUM CHLORIDE 600; 310; 30; 20 MG/100ML; MG/100ML; MG/100ML; MG/100ML
100 INJECTION, SOLUTION INTRAVENOUS CONTINUOUS
Status: DISCONTINUED | OUTPATIENT
Start: 2022-02-16 | End: 2022-02-16 | Stop reason: HOSPADM

## 2022-02-16 RX ORDER — SERTRALINE HYDROCHLORIDE 50 MG/1
100 TABLET, FILM COATED ORAL DAILY
Status: DISCONTINUED | OUTPATIENT
Start: 2022-02-17 | End: 2022-02-18 | Stop reason: HOSPADM

## 2022-02-16 RX ORDER — OXYCODONE HYDROCHLORIDE 5 MG/1
5 TABLET ORAL
Status: DISCONTINUED | OUTPATIENT
Start: 2022-02-16 | End: 2022-02-16 | Stop reason: HOSPADM

## 2022-02-16 RX ORDER — ALBUTEROL SULFATE 90 UG/1
2 AEROSOL, METERED RESPIRATORY (INHALATION)
Status: DISCONTINUED | OUTPATIENT
Start: 2022-02-16 | End: 2022-02-18 | Stop reason: HOSPADM

## 2022-02-16 RX ADMIN — VANCOMYCIN HYDROCHLORIDE 1000 MG: 1 INJECTION, POWDER, LYOPHILIZED, FOR SOLUTION INTRAVENOUS at 12:37

## 2022-02-16 RX ADMIN — LIDOCAINE HYDROCHLORIDE 80 MG: 20 INJECTION, SOLUTION EPIDURAL; INFILTRATION; INTRACAUDAL; PERINEURAL at 10:35

## 2022-02-16 RX ADMIN — HYDROMORPHONE HYDROCHLORIDE 0.2 MG: 1 INJECTION, SOLUTION INTRAMUSCULAR; INTRAVENOUS; SUBCUTANEOUS at 20:23

## 2022-02-16 RX ADMIN — ROPIVACAINE HYDROCHLORIDE 25 ML: 2 INJECTION, SOLUTION EPIDURAL; INFILTRATION at 09:38

## 2022-02-16 RX ADMIN — ACETAMINOPHEN 650 MG: 325 TABLET ORAL at 07:54

## 2022-02-16 RX ADMIN — SODIUM CHLORIDE, SODIUM LACTATE, POTASSIUM CHLORIDE, AND CALCIUM CHLORIDE 100 ML/HR: 600; 310; 30; 20 INJECTION, SOLUTION INTRAVENOUS at 07:54

## 2022-02-16 RX ADMIN — MIDAZOLAM 2 MG: 1 INJECTION INTRAMUSCULAR; INTRAVENOUS at 08:53

## 2022-02-16 RX ADMIN — DEXAMETHASONE SODIUM PHOSPHATE 10 MG: 4 INJECTION, SOLUTION INTRAMUSCULAR; INTRAVENOUS at 10:42

## 2022-02-16 RX ADMIN — SODIUM CHLORIDE, PRESERVATIVE FREE 10 ML: 5 INJECTION INTRAVENOUS at 18:57

## 2022-02-16 RX ADMIN — SODIUM CHLORIDE, PRESERVATIVE FREE 10 ML: 5 INJECTION INTRAVENOUS at 21:26

## 2022-02-16 RX ADMIN — VANCOMYCIN HYDROCHLORIDE 1000 MG: 1 INJECTION, POWDER, LYOPHILIZED, FOR SOLUTION INTRAVENOUS at 18:56

## 2022-02-16 RX ADMIN — PROPOFOL 300 MG: 10 INJECTION, EMULSION INTRAVENOUS at 10:35

## 2022-02-16 RX ADMIN — ROPIVACAINE HYDROCHLORIDE 25 ML: 5 INJECTION, SOLUTION EPIDURAL; INFILTRATION; PERINEURAL at 09:35

## 2022-02-16 RX ADMIN — ONDANSETRON 4 MG: 2 INJECTION INTRAMUSCULAR; INTRAVENOUS at 10:44

## 2022-02-16 RX ADMIN — MIDAZOLAM 2 MG: 1 INJECTION INTRAMUSCULAR; INTRAVENOUS at 10:22

## 2022-02-16 RX ADMIN — FENTANYL CITRATE 50 MCG: 50 INJECTION INTRAMUSCULAR; INTRAVENOUS at 08:57

## 2022-02-16 RX ADMIN — ACETAMINOPHEN 650 MG: 325 TABLET ORAL at 13:06

## 2022-02-16 RX ADMIN — CELECOXIB 200 MG: 200 CAPSULE ORAL at 08:41

## 2022-02-16 RX ADMIN — OXYCODONE HYDROCHLORIDE 10 MG: 5 TABLET ORAL at 17:24

## 2022-02-16 RX ADMIN — PROMETHAZINE HYDROCHLORIDE 12.5 MG: 25 INJECTION INTRAMUSCULAR; INTRAVENOUS at 21:25

## 2022-02-16 RX ADMIN — FENTANYL CITRATE 50 MCG: 50 INJECTION INTRAMUSCULAR; INTRAVENOUS at 09:02

## 2022-02-16 RX ADMIN — ONDANSETRON 4 MG: 2 INJECTION INTRAMUSCULAR; INTRAVENOUS at 13:06

## 2022-02-16 RX ADMIN — Medication 3 AMPULE: at 07:54

## 2022-02-16 NOTE — ANESTHESIA PREPROCEDURE EVALUATION
Relevant Problems   No relevant active problems       Anesthetic History     PONV          Review of Systems / Medical History  Patient summary reviewed and pertinent labs reviewed    Pulmonary            Asthma (occas inhaler, rare symptoms)        Neuro/Psych         Psychiatric history     Cardiovascular            Dysrhythmias (wpw with svt, last episode 2 years ago.)     Pertinent negatives: Hyperlipidemia: WPW, spontaneously abates whenever she has expisodes.   Exercise tolerance: >4 METS     GI/Hepatic/Renal     GERD: well controlled          Comments: Eosinophilic Esophagitis well controlled and asymptomatic without treatment in last year Endo/Other  Within defined limits           Other Findings              Physical Exam    Airway  Mallampati: II  TM Distance: 4 - 6 cm  Neck ROM: normal range of motion   Mouth opening: Normal     Cardiovascular  Regular rate and rhythm,  S1 and S2 normal,  no murmur, click, rub, or gallop  Rhythm: regular  Rate: normal         Dental  No notable dental hx       Pulmonary  Breath sounds clear to auscultation               Abdominal         Other Findings            Anesthetic Plan    ASA: 2  Anesthesia type: general      Post-op pain plan if not by surgeon: peripheral nerve block single    Induction: Intravenous  Anesthetic plan and risks discussed with: Patient and Mother

## 2022-02-16 NOTE — PROGRESS NOTES
VANCO DAILY FOLLOW UP NOTE  [address] [facility] Pharmacokinetic Monitoring Service - Vancomycin    Consulting Provider: Phoebe Smyth   Indication: Bone and Joint infection  Target Concentration: Goal AUC/ISHAN 400-600 mg*hr/L  Day of Therapy: 1  Additional Antimicrobials: minocycline    Pertinent Laboratory Values: Wt Readings from Last 1 Encounters:   02/16/22 60.6 kg (133 lb 9.6 oz)     Temp Readings from Last 1 Encounters:   02/16/22 98 °F (36.7 °C)     No components found for: PROCAL  Recent Labs     02/16/22  0747   WBC 8.0     CrCl cannot be calculated (Patient's most recent lab result is older than the maximum 180 days allowed. ). Lab Results   Component Value Date/Time    Vancomycin,trough 18.0 06/05/2021 07:36 PM       MRSA Nasal Swab: N/A. Non-respiratory infection. .      Assessment:  Date/Time Dose Concentration AUC         Note: Serum concentrations collected for AUC dosing may appear elevated if collected in close proximity to the dose administered, this is not necessarily an indication of toxicity    Plan:  Dosing recommendations based on Bayesian software  Start vancomycin at 1000 mg q12h  Anticipated AUC of 485 and trough concentration of 14.1 at steady state  Renal labs as indicated   Vancomycin concentration ordered as clinically indicated   Pharmacy will continue to monitor patient and adjust therapy as indicated    Thank you for the consult,  Julio Cesar Fischer, PharmD, BCOP  Clinical Pharmacist  Contact via [employer]

## 2022-02-16 NOTE — OP NOTES
Operative Report    Patient: Batsheva Mojica MRN: [id number]  SSN: [id number]    YOB: 2000  Age: 24 y.o. Sex: female       Date of Surgery: 2/16/2022     History:  Batsheva Mojica is a 24 y.o. female who is about 2 weeks out from repair nonunion left ankle with a Acumed fibular nail. She presented to the office with redness and drainage from her incision so she returns today for removal of hardware and debridement. I talked to the patient and/or their representative and explained the exact nature the procedure. I also went through a detailed list of the material risks associated with  the procedure which included risk of bleeding, infection, injury to nearby structures, worsening the situation, as well as the risks associate with anesthesia and finally death. Also talked with him regarding the benefits and alternatives to the procedure. Preoperative Diagnosis: Closed displaced fracture of left lateral malleolus with nonunion [S82.62XK]     Postoperative Diagnosis: Left ankle postoperative wound infection    Surgeon(s) and Role:     * Siri Walter MD - Primary    Anesthesia: General     Procedure: Procedure(s):  LEFT ANKLE HARDWARE REMOVAL      Procedure in Detail: After the successful duction of general anesthetic patient was prepped draped usual sterile fashion.   Removed staples from the previous 2 incisions made in was able to place a extraction device from the [employer] universal extraction site into the distal portion of the nail and remove this once this was done at capture some of the soft tissue and what appeared to be cancellous bone from the lateral malleolus and sent this for culture I irrigated the wounds there was no gross purulence but there was some redness around her incisions I am then irrigated the wounds I also used a curette to try to curette the distal portion of the lateral eyelids as well as the area around the nonunion site I placed some bone graft and after curetting this as well as irrigating this area I then closed both my incisions with horizontal mattress 3-0 nylon suture and dressings were applied the patient was then awakened and taken recovery in stable condition      Estimated Blood Loss: 30 cc    Tourniquet Time: * No tourniquets in log *      Implants: * No implants in log *            Specimens:   ID Type Source Tests Collected by Time Destination   1 : Left fibula wound Wound Ankle CULTURE, ANAEROBIC, CULTURE, WOUND W GRAM STAIN Rinku Woo MD 2/16/2022 1048 Microbiology           Drains: None                Complications: None    Counts: Sponge and needle counts were correct times two.     Signed By:  Yamileth Coe MD     February 16, 2022

## 2022-02-16 NOTE — ANESTHESIA PROCEDURE NOTES
Peripheral Block    Start time: 2/16/2022 8:57 AM  End time: 2/16/2022 9:02 AM  Performed by: Will Cross MD  Authorized by: Will Cross MD       Pre-procedure: Indications: at surgeon's request, post-op pain management and procedure for pain    Preanesthetic Checklist: patient identified, risks and benefits discussed, site marked, timeout performed, anesthesia consent given and patient being monitored      Block Type:   Block Type:  Popliteal  Laterality:  Left  Monitoring:  Standard ASA monitoring, frequent vital sign checks, heart rate, responsive to questions and oxygen  Injection Technique:  Single shot  Procedures: ultrasound guided and nerve stimulator    Patient Position: supine  Prep: chlorhexidine    Location:  Mid thigh  Needle Type:  Stimuplex  Needle Gauge:  20 G  Needle Localization:  Ultrasound guidance  Motor Response comment:   Motor Response: minimal motor response >0.4 mA   Medication Injected:  Ropivacaine 0.5% with epinephrine 1:200,000 injection, 25 mL (Mixture components: EPINEPHrine HCl (PF) 1 mg/mL (1 mL) Soln, . 005 mL; ropivacaine (PF) 5 mg/mL (0.5 %) Soln, 1 mL)    Assessment:    Injection Assessment:

## 2022-02-16 NOTE — ANESTHESIA PROCEDURE NOTES
Peripheral Block    Start time: 2/16/2022 9:02 AM  End time: 2/16/2022 9:05 AM  Performed by: Rashard Clifford MD  Authorized by: Rashard Clifford MD       Pre-procedure: Indications: post-op pain management    Preanesthetic Checklist: patient identified, risks and benefits discussed, site marked, timeout performed, anesthesia consent given and patient being monitored    Timeout Time: 08:57 EST          Block Type:   Block Type: Adductor canal block  Laterality:  Left  Monitoring:  Standard ASA monitoring, continuous pulse ox, frequent vital sign checks, heart rate, responsive to questions and oxygen  Injection Technique:  Single shot  Procedures: ultrasound guided and nerve stimulator    Patient Position: supine  Prep: chlorhexidine    Location:  Mid thigh  Needle Type:  Stimuplex  Needle Gauge:  20 G  Needle Localization:  Ultrasound guidance  Medication Injected:  Ropivacaine 0.2% with epinephrine 1:200,000 injection, 25 mL (Mixture components: ropivacaine 2 mg/mL (0.2 %) Soln, 1 mL; EPINEPHrine HCl (PF) 1 mg/mL (1 mL) Soln, . 005 mL)    Assessment:    Injection Assessment:

## 2022-02-16 NOTE — ANESTHESIA POSTPROCEDURE EVALUATION
Procedure(s):  LEFT ANKLE HARDWARE REMOVAL  INCISION AND DRAINAGE LEFT ANKLE CHOICE ANES. general    Anesthesia Post Evaluation      Multimodal analgesia: multimodal analgesia used between 6 hours prior to anesthesia start to PACU discharge  Patient location during evaluation: bedside  Patient participation: complete - patient participated  Level of consciousness: responsive to verbal stimuli  Pain score: 0  Pain management: adequate  Airway patency: patent  Anesthetic complications: no  Cardiovascular status: acceptable and hemodynamically stable  Respiratory status: acceptable  Hydration status: acceptable  Post anesthesia nausea and vomiting:  controlled  Final Post Anesthesia Temperature Assessment:  Normothermia (36.0-37.5 degrees C)      INITIAL Post-op Vital signs:   Vitals Value Taken Time   /69 02/16/22 1121   Temp 36.7 °C (98.1 °F) 02/16/22 1113   Pulse 68 02/16/22 1122   Resp 10 02/16/22 1113   SpO2 100 % 02/16/22 1122   Vitals shown include unvalidated device data.

## 2022-02-16 NOTE — INTERVAL H&P NOTE
Update History & Physical    The Patient's History and Physical of February 16, 2022 was reviewed with the patient and I examined the patient. There was no change. The surgical site was confirmed by the patient and me. Plan:  The risk, benefits, expected outcome, and alternative to the recommended procedure have been discussed with the patient. Patient understands and wants to proceed with removal of hardware and debridement of left ankle wound.     Electronically signed by Latrice Acuña MD on 2/16/2022 at 10:04 AM

## 2022-02-16 NOTE — PERIOP NOTES
TRANSFER - OUT REPORT:    Verbal report given to Mike Carney RN on [facility]  being transferred to  for routine post - op       Report consisted of patients Situation, Background, Assessment and   Recommendations(SBAR). Information from the following report(s) SBAR, OR Summary, MAR and Cardiac Rhythm nsr was reviewed with the receiving nurse. Lines:   PICC Single Lumen 41/52/50 Right;Basilic (Active)       Peripheral IV 02/16/22 Right Hand (Active)   Site Assessment Clean, dry, & intact 02/16/22 1143   Phlebitis Assessment 0 02/16/22 1143   Infiltration Assessment 0 02/16/22 1143   Dressing Status Clean, dry, & intact 02/16/22 1143   Dressing Type Transparent;Tape 02/16/22 1143   Hub Color/Line Status Patent 02/16/22 1143   Alcohol Cap Used No 02/16/22 1143        Opportunity for questions and clarification was provided. Patient transported with:   Tech    VTE prophylaxis orders have been written for [facility]. Patient and family given floor number and nurses name. Family updated re: pt status after security code verified.

## 2022-02-17 LAB
ANION GAP SERPL CALC-SCNC: 11 MMOL/L (ref 7–16)
BASOPHILS # BLD: 0 K/UL (ref 0–0.2)
BASOPHILS NFR BLD: 0 % (ref 0–2)
BUN SERPL-MCNC: 8 MG/DL (ref 6–23)
CALCIUM SERPL-MCNC: 8.6 MG/DL (ref 8.3–10.4)
CHLORIDE SERPL-SCNC: 110 MMOL/L (ref 98–107)
CO2 SERPL-SCNC: 21 MMOL/L (ref 21–32)
CREAT SERPL-MCNC: 0.7 MG/DL (ref 0.6–1)
DIFFERENTIAL METHOD BLD: ABNORMAL
EOSINOPHIL # BLD: 0 K/UL (ref 0–0.8)
EOSINOPHIL NFR BLD: 0 % (ref 0.5–7.8)
ERYTHROCYTE [DISTWIDTH] IN BLOOD BY AUTOMATED COUNT: 14 % (ref 11.9–14.6)
GLUCOSE SERPL-MCNC: 93 MG/DL (ref 65–100)
HCT VFR BLD AUTO: 39.6 % (ref 35.8–46.3)
HGB BLD-MCNC: 12.7 G/DL (ref 11.7–15.4)
IMM GRANULOCYTES # BLD AUTO: 0 K/UL (ref 0–0.5)
IMM GRANULOCYTES NFR BLD AUTO: 0 % (ref 0–5)
LYMPHOCYTES # BLD: 1.9 K/UL (ref 0.5–4.6)
LYMPHOCYTES NFR BLD: 22 % (ref 13–44)
MCH RBC QN AUTO: 28.6 PG (ref 26.1–32.9)
MCHC RBC AUTO-ENTMCNC: 32.1 G/DL (ref 31.4–35)
MCV RBC AUTO: 89.2 FL (ref 79.6–97.8)
MONOCYTES # BLD: 0.4 K/UL (ref 0.1–1.3)
MONOCYTES NFR BLD: 5 % (ref 4–12)
NEUTS SEG # BLD: 6.2 K/UL (ref 1.7–8.2)
NEUTS SEG NFR BLD: 72 % (ref 43–78)
NRBC # BLD: 0 K/UL (ref 0–0.2)
PLATELET # BLD AUTO: 225 K/UL (ref 150–450)
PMV BLD AUTO: 10.8 FL (ref 9.4–12.3)
POTASSIUM SERPL-SCNC: 4.4 MMOL/L (ref 3.5–5.1)
RBC # BLD AUTO: 4.44 M/UL (ref 4.05–5.2)
SODIUM SERPL-SCNC: 142 MMOL/L (ref 136–145)
VANCOMYCIN SERPL-MCNC: 19.4 UG/ML
WBC # BLD AUTO: 8.6 K/UL (ref 4.3–11.1)

## 2022-02-17 PROCEDURE — 74011250636 HC RX REV CODE- 250/636: Performed by: NURSE PRACTITIONER

## 2022-02-17 PROCEDURE — 96374 THER/PROPH/DIAG INJ IV PUSH: CPT

## 2022-02-17 PROCEDURE — 96375 TX/PRO/DX INJ NEW DRUG ADDON: CPT

## 2022-02-17 PROCEDURE — 36415 COLL VENOUS BLD VENIPUNCTURE: CPT

## 2022-02-17 PROCEDURE — 74011250636 HC RX REV CODE- 250/636: Performed by: ORTHOPAEDIC SURGERY

## 2022-02-17 PROCEDURE — 74011000258 HC RX REV CODE- 258: Performed by: NURSE PRACTITIONER

## 2022-02-17 PROCEDURE — 85025 COMPLETE CBC W/AUTO DIFF WBC: CPT

## 2022-02-17 PROCEDURE — G0378 HOSPITAL OBSERVATION PER HR: HCPCS

## 2022-02-17 PROCEDURE — 74011000250 HC RX REV CODE- 250: Performed by: ORTHOPAEDIC SURGERY

## 2022-02-17 PROCEDURE — 80202 ASSAY OF VANCOMYCIN: CPT

## 2022-02-17 PROCEDURE — 74011250637 HC RX REV CODE- 250/637: Performed by: ORTHOPAEDIC SURGERY

## 2022-02-17 PROCEDURE — 80048 BASIC METABOLIC PNL TOTAL CA: CPT

## 2022-02-17 RX ADMIN — VANCOMYCIN HYDROCHLORIDE 1000 MG: 1 INJECTION, POWDER, LYOPHILIZED, FOR SOLUTION INTRAVENOUS at 05:41

## 2022-02-17 RX ADMIN — SERTRALINE 100 MG: 50 TABLET, FILM COATED ORAL at 09:04

## 2022-02-17 RX ADMIN — PROMETHAZINE HYDROCHLORIDE 12.5 MG: 25 INJECTION INTRAMUSCULAR; INTRAVENOUS at 18:04

## 2022-02-17 RX ADMIN — ONDANSETRON 4 MG: 2 INJECTION INTRAMUSCULAR; INTRAVENOUS at 09:12

## 2022-02-17 RX ADMIN — SODIUM CHLORIDE, PRESERVATIVE FREE 10 ML: 5 INJECTION INTRAVENOUS at 05:37

## 2022-02-17 RX ADMIN — SODIUM CHLORIDE, PRESERVATIVE FREE 10 ML: 5 INJECTION INTRAVENOUS at 16:07

## 2022-02-17 RX ADMIN — PROMETHAZINE HYDROCHLORIDE 12.5 MG: 25 INJECTION INTRAMUSCULAR; INTRAVENOUS at 05:36

## 2022-02-17 RX ADMIN — SODIUM CHLORIDE, PRESERVATIVE FREE 10 ML: 5 INJECTION INTRAVENOUS at 21:24

## 2022-02-17 RX ADMIN — MINOCYCLINE HYDROCHLORIDE 100 MG: 50 CAPSULE ORAL at 09:05

## 2022-02-17 RX ADMIN — OXYCODONE HYDROCHLORIDE 10 MG: 5 TABLET ORAL at 09:12

## 2022-02-17 RX ADMIN — OXYCODONE HYDROCHLORIDE 10 MG: 5 TABLET ORAL at 05:36

## 2022-02-17 RX ADMIN — HYDROMORPHONE HYDROCHLORIDE 0.2 MG: 1 INJECTION, SOLUTION INTRAMUSCULAR; INTRAVENOUS; SUBCUTANEOUS at 21:27

## 2022-02-17 RX ADMIN — VANCOMYCIN HYDROCHLORIDE 1000 MG: 1 INJECTION, POWDER, LYOPHILIZED, FOR SOLUTION INTRAVENOUS at 18:34

## 2022-02-17 NOTE — PROGRESS NOTES
VANCO DAILY FOLLOW UP NOTE  [address] [facility] Pharmacokinetic Monitoring Service - Vancomycin    Consulting Provider: Andre Huitron   Indication: Bone and Joint infection  Target Concentration: Goal AUC/ISHAN 400-600 mg*hr/L  Day of Therapy: 2  Additional Antimicrobials: minocycline    Pertinent Laboratory Values: Wt Readings from Last 1 Encounters:   02/16/22 60.6 kg (133 lb 9.6 oz)     Temp Readings from Last 1 Encounters:   02/17/22 98 °F (36.7 °C)     No components found for: PROCAL  Recent Labs     02/17/22  1046 02/17/22  0545 02/16/22  1859 02/16/22  0747   BUN 8  --   --   --    CREA 0.70  --  0.60  --    WBC  --  8.6  --  8.0     Estimated Creatinine Clearance: 109 mL/min (based on SCr of 0.7 mg/dL). Lab Results   Component Value Date/Time    Vancomycin,trough 18.0 06/05/2021 07:36 PM    Vancomycin, random 19.4 02/17/2022 10:46 AM       MRSA Nasal Swab: N/A. Non-respiratory infection. .    Assessment:  Date/Time Dose Concentration AUC   [email or web address] 1000 mg q12h 19.4 498   Note: Serum concentrations collected for AUC dosing may appear elevated if collected in close proximity to the dose administered, this is not necessarily an indication of toxicity    Plan:  1. Current dosing regimen is therapeutic  2. Continue current dose  3. Repeat vancomycin concentration ordered for 2/18@ 0400   4.  Pharmacy will continue to monitor patient and adjust therapy as indicated    Thank you for the consult,  Sherif Henry, RamirezD    [facility]  [email or web address]

## 2022-02-17 NOTE — PROGRESS NOTES
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     Assessment / Plan :  Patient Active Problem List   Diagnosis Code    W[facility] complic[facility] ([facility] T84. 7XXA    Hypotension I95.9    Chest pain R07.9    Postoperative wound infection T81.49XA      Patient Vitals for the past 8 hrs:   BP Temp Pulse Resp SpO2   22 0840 93/60 97.7 °F (36.5 °C) 88 18 100 %   22 0457 103/66 98.3 °F (36.8 °C) 77 19 99 %    Temp (24hrs), Av.9 °F (36.6 °C), Min:97.7 °F (36.5 °C), Max:98.3 °F (36.8 °C)    Body mass index is 24.44 kg/m². Lab Results   Component Value Date/Time    HGB 12.7 2022 05:45 AM          S/P Procedure(s) (LRB):  LEFT ANKLE HARDWARE REMOVAL (Left)  INCISION AND DRAINAGE LEFT ANKLE CHOICE ANES (Left)     Will place in walker boot.    Follow cultures   Fall Precautions  DC disp: home when cultures finalize   F/U: 2 weeks postop for wound check and staple removal        Signed By: SHAWN Chopra  2022,  10:37 AM

## 2022-02-18 VITALS
TEMPERATURE: 98.2 F | SYSTOLIC BLOOD PRESSURE: 102 MMHG | HEIGHT: 62 IN | WEIGHT: 133.6 LBS | RESPIRATION RATE: 16 BRPM | BODY MASS INDEX: 24.59 KG/M2 | DIASTOLIC BLOOD PRESSURE: 71 MMHG | OXYGEN SATURATION: 99 % | HEART RATE: 70 BPM

## 2022-02-18 LAB
ANION GAP SERPL CALC-SCNC: 6 MMOL/L (ref 7–16)
BASOPHILS # BLD: 0 K/UL (ref 0–0.2)
BASOPHILS NFR BLD: 1 % (ref 0–2)
BUN SERPL-MCNC: 9 MG/DL (ref 6–23)
CALCIUM SERPL-MCNC: 8.5 MG/DL (ref 8.3–10.4)
CHLORIDE SERPL-SCNC: 111 MMOL/L (ref 98–107)
CO2 SERPL-SCNC: 24 MMOL/L (ref 21–32)
CREAT SERPL-MCNC: 0.8 MG/DL (ref 0.6–1)
DIFFERENTIAL METHOD BLD: ABNORMAL
EOSINOPHIL # BLD: 0.1 K/UL (ref 0–0.8)
EOSINOPHIL NFR BLD: 3 % (ref 0.5–7.8)
ERYTHROCYTE [DISTWIDTH] IN BLOOD BY AUTOMATED COUNT: 14.2 % (ref 11.9–14.6)
GLUCOSE SERPL-MCNC: 147 MG/DL (ref 65–100)
HCT VFR BLD AUTO: 36 % (ref 35.8–46.3)
HGB BLD-MCNC: 11.6 G/DL (ref 11.7–15.4)
IMM GRANULOCYTES # BLD AUTO: 0 K/UL (ref 0–0.5)
IMM GRANULOCYTES NFR BLD AUTO: 0 % (ref 0–5)
LYMPHOCYTES # BLD: 2.2 K/UL (ref 0.5–4.6)
LYMPHOCYTES NFR BLD: 39 % (ref 13–44)
MCH RBC QN AUTO: 28.4 PG (ref 26.1–32.9)
MCHC RBC AUTO-ENTMCNC: 32.2 G/DL (ref 31.4–35)
MCV RBC AUTO: 88.2 FL (ref 79.6–97.8)
MONOCYTES # BLD: 0.4 K/UL (ref 0.1–1.3)
MONOCYTES NFR BLD: 8 % (ref 4–12)
NEUTS SEG # BLD: 2.8 K/UL (ref 1.7–8.2)
NEUTS SEG NFR BLD: 50 % (ref 43–78)
NRBC # BLD: 0 K/UL (ref 0–0.2)
PLATELET # BLD AUTO: 190 K/UL (ref 150–450)
PMV BLD AUTO: 10.9 FL (ref 9.4–12.3)
POTASSIUM SERPL-SCNC: 3.1 MMOL/L (ref 3.5–5.1)
RBC # BLD AUTO: 4.08 M/UL (ref 4.05–5.2)
SODIUM SERPL-SCNC: 141 MMOL/L (ref 136–145)
VANCOMYCIN SERPL-MCNC: 13.3 UG/ML
WBC # BLD AUTO: 5.7 K/UL (ref 4.3–11.1)

## 2022-02-18 PROCEDURE — 74011250637 HC RX REV CODE- 250/637: Performed by: PHYSICIAN ASSISTANT

## 2022-02-18 PROCEDURE — 80048 BASIC METABOLIC PNL TOTAL CA: CPT

## 2022-02-18 PROCEDURE — 96375 TX/PRO/DX INJ NEW DRUG ADDON: CPT

## 2022-02-18 PROCEDURE — 85025 COMPLETE CBC W/AUTO DIFF WBC: CPT

## 2022-02-18 PROCEDURE — 80202 ASSAY OF VANCOMYCIN: CPT

## 2022-02-18 PROCEDURE — 74011250637 HC RX REV CODE- 250/637: Performed by: ORTHOPAEDIC SURGERY

## 2022-02-18 PROCEDURE — 96376 TX/PRO/DX INJ SAME DRUG ADON: CPT

## 2022-02-18 PROCEDURE — 36415 COLL VENOUS BLD VENIPUNCTURE: CPT

## 2022-02-18 PROCEDURE — 74011250636 HC RX REV CODE- 250/636: Performed by: ORTHOPAEDIC SURGERY

## 2022-02-18 PROCEDURE — 74011000250 HC RX REV CODE- 250: Performed by: ORTHOPAEDIC SURGERY

## 2022-02-18 PROCEDURE — G0378 HOSPITAL OBSERVATION PER HR: HCPCS

## 2022-02-18 RX ORDER — SULFAMETHOXAZOLE AND TRIMETHOPRIM 800; 160 MG/1; MG/1
1 TABLET ORAL 2 TIMES DAILY
Qty: 28 TABLET | Refills: 1 | Status: SHIPPED | OUTPATIENT
Start: 2022-02-18 | End: 2022-03-01 | Stop reason: SDUPTHER

## 2022-02-18 RX ORDER — PROMETHAZINE HYDROCHLORIDE 12.5 MG/1
25 TABLET ORAL
Status: DISCONTINUED | OUTPATIENT
Start: 2022-02-18 | End: 2022-02-18 | Stop reason: HOSPADM

## 2022-02-18 RX ORDER — PROMETHAZINE HYDROCHLORIDE 25 MG/1
25 TABLET ORAL
Qty: 28 TABLET | Refills: 1 | Status: SHIPPED | OUTPATIENT
Start: 2022-02-18 | End: 2022-02-25

## 2022-02-18 RX ORDER — OXYCODONE HYDROCHLORIDE 10 MG/1
10 TABLET ORAL
Qty: 18 TABLET | Refills: 0 | Status: SHIPPED | OUTPATIENT
Start: 2022-02-18 | End: 2022-02-21

## 2022-02-18 RX ADMIN — VANCOMYCIN HYDROCHLORIDE 1000 MG: 1 INJECTION, POWDER, LYOPHILIZED, FOR SOLUTION INTRAVENOUS at 06:28

## 2022-02-18 RX ADMIN — HYDROMORPHONE HYDROCHLORIDE 0.2 MG: 1 INJECTION, SOLUTION INTRAMUSCULAR; INTRAVENOUS; SUBCUTANEOUS at 06:44

## 2022-02-18 RX ADMIN — SERTRALINE 100 MG: 50 TABLET, FILM COATED ORAL at 08:46

## 2022-02-18 RX ADMIN — MINOCYCLINE HYDROCHLORIDE 100 MG: 50 CAPSULE ORAL at 08:46

## 2022-02-18 RX ADMIN — PROMETHAZINE HYDROCHLORIDE 25 MG: 12.5 TABLET ORAL at 12:27

## 2022-02-18 RX ADMIN — SODIUM CHLORIDE, PRESERVATIVE FREE 10 ML: 5 INJECTION INTRAVENOUS at 06:31

## 2022-02-18 RX ADMIN — ONDANSETRON 4 MG: 2 INJECTION INTRAMUSCULAR; INTRAVENOUS at 04:14

## 2022-02-18 NOTE — PROGRESS NOTES
Pt discharge instruction and follow up explained to pt and pt mother. IV removed and tolerated. No needs/concerns verbalized by pt or family.

## 2022-02-18 NOTE — PROGRESS NOTES
CM screened chart for potential discharge needs. No CM consult received. No therapy orders noted and patient will be placed in walking boot. Patient remains inpatient under observation status while final wound cultures are pending. CM messaged attending to confirm if patient could possibly need IV antibiotics at discharge. Attending confirms that he will be discharging patient today with walking boot and PO antibiotics.       Care Management Interventions  Physical Therapy Consult: No  Occupational Therapy Consult: No  Discharge Location  Patient Expects to be Discharged to[de-identified] Home

## 2022-02-18 NOTE — PROGRESS NOTES
VANCO DAILY FOLLOW UP NOTE  [address] [facility] Pharmacokinetic Monitoring Service - Vancomycin    Consulting Provider: Parveen Ann   Indication: Bone and Joint infection  Target Concentration: Goal AUC/ISHAN 400-600 mg*hr/L  Day of Therapy: 3  Additional Antimicrobials: minocycline    Pertinent Laboratory Values: Wt Readings from Last 1 Encounters:   02/16/22 60.6 kg (133 lb 9.6 oz)     Temp Readings from Last 1 Encounters:   02/18/22 98.4 °F (36.9 °C)     No components found for: PROCAL  Recent Labs     02/18/22  0535 02/17/22  1046 02/17/22  0545 02/16/22  1859 02/16/22  0747   BUN 9 8  --   --   --    CREA 0.80 0.70  --  0.60  --    WBC 5.7  --  8.6  --  8.0     Estimated Creatinine Clearance: 95.4 mL/min (based on SCr of 0.8 mg/dL). Lab Results   Component Value Date/Time    Vancomycin,trough 18.0 06/05/2021 07:36 PM    Vancomycin, random 13.3 02/18/2022 05:35 AM     MRSA Nasal Swab: N/A. Non-respiratory infection. .    Assessment:  Date/Time Dose Concentration AUC   2/17 1046 1000 mg q12h 19.4 498   2/18 0535 1000 mg q12h 13.3 550   Note: Serum concentrations collected for AUC dosing may appear elevated if collected in close proximity to the dose administered, this is not necessarily an indication of toxicity    Plan:  Current dosing regimen is therapeutic  Continue current dose  Repeat vancomycin concentration ordered as clinically indicated  Pharmacy will continue to monitor patient and adjust therapy as indicated    Thank you for the consult,  RAJINDER Sandoval

## 2022-02-18 NOTE — DISCHARGE INSTRUCTIONS
[facility]      Patient Discharge Instructions    [facility] / [id number] : 2000    Admitted 2022 Discharged: 2022     IF YOU HAVE ANY PROBLEMS ONCE YOU ARE AT HOME CALL THE FOLLOWING NUMBERS:   Main office number: [phone] ask for Brady Allen (medical assistant with Dr. Derrell Clark)  Office Address: [address] [place] [address] Dr. Andrea, [address] [facility]          Activity  · Use walking boot when walking   · Keep dressing dry and clean  · Weight bearing as tolerated       Diet  · Resume regular diet       Medications    · The medications you are to continue on are listed on the medication reconciliation sheet. · Narcotic pain medications as well as supplemental iron can cause constipation. If this occurs try stopping the narcotic pain medication and/or the iron. · It is important that you take the medication exactly as they are prescribed. · Keep your medication in the bottles provided by the pharmacist and keep a list of the medication names, dosages, and times to be taken in your wallet. · Do not take other medications without consulting your doctor. Other Important Information    Do NOT smoke as this will greatly increase your risk of infection! Do not drive and operate hazardous machinery until being cleared to do so by your doctor . You are at a risk for falls. Use crutches when walking     Patients should not drive if they are still taking narcotic pain mediation during the daytime hours. Most patients wean themselves off of pain medication within 2-5 weeks after surgery. Please give a list of your current medications to your Primary Care Provider and update this list whenever your medications are discontinued, doses are changed, or new medications (including over-the-counter products) are added. Please carry medication information at all times in case of emergency situations.     If you have sleep apnea and have a CPAP machine, please use it for all naps and sleeping. When to Call the office    - If you have a temperature greater then 101  - Excessive bleeding that does not stop after holding pressure over the area  - Excessive redness, swelling or bruising, and/ or green or yellow, smelly discharge from incision  - Uncontrolled vomiting   - Loose control of your bladder or bowel function  - Need a pain medication refill   - Need to change your follow up appointment     Information obtained by :  I understand that if any problems occur once I am at home I am to contact my physician. I understand and acknowledge receipt of the instructions indicated above.                                                                                                                                            Physician's or R.N.'s Signature                                                                  Date/Time                                                                                                                                              Patient or Representative Signature                                                          Date/Time

## 2022-02-18 NOTE — DISCHARGE SUMMARY
[facility]   Discharge Summary         Patient ID:  Trevor Arora  [id number]  51 y.o.  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 Discharge Diagnoses: Postoperative wound infection. Chronic and Acute medical problems addressed during this hospital stay by consulting physicians include: They underwent Procedure(s) (LRB):  LEFT ANKLE HARDWARE REMOVAL (Left)  INCISION AND DRAINAGE LEFT ANKLE CHOICE ANES (Left) for this. Principle Problem: Postoperative wound infection. Perioperative Antibiotics:  Prior to surgery Ancef 1 to 2 mg was given depending on patient's weight and allergies.   If the patient was allergic to Ancef or MRSA positive  the patient [facility]        [facility] Medications:   Current Facility-Administered Medications   Medication Dose Route Frequency    promethazine (PHENERGAN) tablet 25 mg  25 mg Oral Q6H PRN    promethazine (PHENERGAN) 12.5 mg in 0.9% sodium chloride 50 mL IVPB  12.5 mg IntraVENous Q6H PRN    vancomycin (VANCOCIN) 1,000 mg in 0.9% sodium chloride 250 mL (Torq8Hun)  1 g IntraVENous Q12H    albuterol (PROVENTIL HFA, VENTOLIN HFA, PROAIR HFA) inhaler 2 Puff  2 Puff Inhalation Q4H PRN    minocycline (MINOCIN, DYNACIN) capsule 100 mg  100 mg Oral DAILY    sertraline (ZOLOFT) tablet 100 mg  100 mg Oral DAILY    sodium chloride (NS) flush 5-40 mL  5-40 mL IntraVENous Q8H    sodium chloride (NS) flush 5-40 mL  5-40 mL IntraVENous PRN    acetaminophen (TYLENOL) tablet 650 mg  650 mg Oral Q6H PRN    Or    acetaminophen (TYLENOL) suppository 650 mg  650 mg Rectal Q6H PRN    polyethylene glycol (MIRALAX) packet 17 g  17 g Oral DAILY PRN    oxyCODONE IR (ROXICODONE) tablet 10 mg  10 mg Oral Q3H PRN    HYDROmorphone (DILAUDID) injection 0.2 mg  0.2 mg IntraVENous Q2H PRN         Additional DVT Prophylaxis:  TANGELA Hose, SCDs     Postoperative Blood Report: If the patient received blood products during their admission they are listed below:     Lab Results   Component Value Date/Time    Crossmatch Expiration 02/19/2022,2359 02/16/2022 07:33 AM     Lab Results   Component Value Date/Time    ABO/Rh(D) [facility] POSITIVE 02/16/2022 07:33 AM     Lab Results   Component Value Date/Time    ABO/Rh(D) [facility] POSITIVE 02/16/2022 07:33 AM     No results found for: PCTUN  No results found for: PCTCT  No results found for: PCTUDIV  No results found for: PCTXM    Post Op complications: none       Physical Therapy: PT was started on the day of surgery and progressed. PT/OT:       Activity Response: Tolerated well  Assistive Device: Fall prevention device                  Disposition: Good    Upon Discharge: The wound appears to be healing without any evidence of infection. Intraoperative cultures: no growth     Pt Discharged to: Home or Self Care. Discharge Medications:   Current Discharge Medication List      START taking these medications    Details   promethazine (PHENERGAN) 25 mg tablet Take 1 Tablet by mouth every six (6) hours as needed for Nausea for up to 7 days. Qty: 28 Tablet, Refills: 1  Start date: 2/18/2022, End date: 2/25/2022      oxyCODONE IR (ROXICODONE) 10 mg tab immediate release tablet Take 1 Tablet by mouth every four (4) hours as needed for Pain for up to 3 days.  Max Daily Amount: 60 mg.  Qty: 18 Tablet, Refills: 0  Start date: 2/18/2022, End date: 2/21/2022    Associated Diagnoses: Postoperative wound infection      trimethoprim-sulfamethoxazole (BACTRIM DS, SEPTRA DS) 160-800 mg per tablet Take 1 Tablet by mouth two (2) times a day for 14 days. Qty: 28 Tablet, Refills: 1  Start date: 2/18/2022, End date: 3/4/2022         CONTINUE these medications which have NOT CHANGED    Details   triamcinolone acetonide (KENALOG) 0.1 % ointment daily as needed. ibuprofen (MOTRIN) 200 mg tablet Take 400 mg by mouth every six (6) hours as needed for Pain. Lisdexamfetamine (Vyvanse) 60 mg capsule Take 1 Capsule by mouth daily as needed. Indications: attention deficit disorder with hyperactivity      minocycline (MINOCIN, DYNACIN) 100 mg capsule Take 100 mg by mouth daily. Indications: acne      albuterol (PROVENTIL HFA, VENTOLIN HFA, PROAIR HFA) 90 mcg/actuation inhaler Take 2 Puffs by inhalation every four (4) hours as needed for Wheezing or Shortness of Breath. Recommend using at least 4 times a day for the first week. Qty: 1 Each, Refills: 0      rizatriptan (Maxalt) 10 mg tablet Take 10 mg by mouth once as needed for Migraine. May repeat in 2 hours if needed      Dupixent Pen 300 mg/2 mL pnij Once every 2 weeks. On Thursday      sertraline (ZOLOFT) 100 mg tablet Take 100 mg by mouth daily as needed.                Discharge instructions:  - Refer to the Medication Reconciliation sheet for all discharge medications   -Rx pain medication given   -Resume pre hospital diet         - Use walker boot when ambulating   -Rx for Septra DS x 2 weeks       -Ambulate with crutches and fall precautions   -Follow up in office as scheduled       Signed:  SHAWN Love  2/18/2022  9:29 AM

## 2022-02-18 NOTE — PROGRESS NOTES
Initial visit by  to convey care and concern and to explore spiritual needs. No needs were voiced during the visit. Chaplains remain available for follow-up care.      Angela Packer 68  Board Certified

## 2022-02-22 LAB
BACTERIA SPEC CULT: ABNORMAL
BACTERIA SPEC CULT: ABNORMAL
GRAM STN SPEC: ABNORMAL
GRAM STN SPEC: ABNORMAL
SERVICE CMNT-IMP: ABNORMAL

## 2022-02-23 LAB
BACTERIA SPEC CULT: NORMAL
SERVICE CMNT-IMP: NORMAL

## 2022-03-18 PROBLEM — R07.9 CHEST PAIN: Status: ACTIVE | Noted: 2021-06-07

## 2022-03-19 PROBLEM — I95.9 HYPOTENSION: Status: ACTIVE | Noted: 2021-06-04

## 2022-03-19 PROBLEM — T84.7XXA WOUND INFECTION COMPLICATING HARDWARE (HCC): Status: ACTIVE | Noted: 2021-06-04

## 2022-03-19 PROBLEM — T81.49XA POSTOPERATIVE WOUND INFECTION: Status: ACTIVE | Noted: 2022-02-16

## 2022-08-30 ENCOUNTER — OFFICE VISIT (OUTPATIENT)
Dept: ORTHOPEDIC SURGERY | Age: 22
End: 2022-08-30
Payer: COMMERCIAL

## 2022-08-30 DIAGNOSIS — S82.62XK: Primary | ICD-10-CM

## 2022-08-30 PROCEDURE — 99212 OFFICE O/P EST SF 10 MIN: CPT | Performed by: ORTHOPAEDIC SURGERY

## 2022-08-30 NOTE — PROGRESS NOTES
Progress Note    Patient: Esther Ponce MRN: [id number]  SSN: [id number]    YOB: 2000  Age: 24 y.o. Sex: female        8/30/2022      Subjective:     Patient is now well over a year out from her original ankle fracture. She had plate and screw fixation of a left lateral malleolus fracture that was complicated by getting infected. This hardware was later removed she had treatment with IV antibiotics. 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 She also started draining after this procedure so we went back debrided this took this out. Since that time she is really done well as far as her wounds are concerned she is healed these up she had no redness or drainage. But she is back here today now just complaining of a lot of pain in her foot especially with prolonged standing. She says it seems to bother her the most whenever she has been active for a while and then sits and gets a little stiff and then when she gets up and tries to walk she has a lot of discomfort. It seems to be worse at the end of the day and then at the beginning of the day. She says she cannot run. She cannot do any jogging and she like to do this. Also feels like she has some clicking in her left ankle that sort of new over the last several weeks. 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 Objective: There were no vitals filed for this visit.        Physical Exam:     Skin - incision is well healed with no redness or drainage  Motor and sensory function intact in LEFT LOWER extremity  Pulses palpable in LEFT LOWER extremity     XRAY FINDINGS:  [id number] left lateral malleolus nonunion, findings-3 views the left ankle shows that the left lateral malleolus fracture now has completely healed and has bridging callus across the primary fracture lines. Her ankle mortise itself is reasonably well aligned. I do think she has some slight shortening of the fibula and some mild widening medial clear space but this appears to be pretty stable compared to her old films. Impression-healed reasonably well aligned left ankle fracture    Assessment:     Left ankle pain    Plan:     I think the first thing we will do is try some simple things such as some physical therapy in regard to try an ankle brace as well as some nonsteroidals and see how much this helped. Obviously if this is enough to make it where she can tolerate her job pretty well and we likely would not have to go any further. I do not think there is any reason she cannot try to jog if she has improvement of her symptoms but I am concerned that she may have difficulty doing this because it may just hurt her too much. I think that if these things do not work it might be reasonable to see if one of the foot and ankle doctors either Dr. Eunice Barry or Dr. Francisco Duque could see her and offer an opinion on whether or not they think any further reconstructive type surgery might be something that could help her out or not. She seems to be comfortable with this plan so the first thing Menetrier to some conservative measures and see how much this helps. Far as therapy is concerned I would like her to do outpatient physical therapy this would be 2-3 times a week for 4 to 6 weeks. This would work on range of motion and strengthening left ankle. Especially with her peroneal tendons and see if that this helps her out.     Signed By: Rolando Suarez MD     August 30, 2022

## 2022-10-14 ENCOUNTER — NURSE TRIAGE (OUTPATIENT)
Dept: OTHER | Facility: CLINIC | Age: 22
End: 2022-10-14

## 2022-10-14 NOTE — TELEPHONE ENCOUNTER
Location of patient: [place]    Received call from [place] at [employer] with [employer]. Subjective: Caller states \"I have chest pain\"     Current Symptoms: Body pains the last few weeks. Rash on and off underneath her eyes. No trouble breathing. Has been sick the past week with cold symptoms. Pain is in center of her chest.     Onset: 1 hour ago; unchanged    Associated Symptoms: reduced activity    Pain Severity: 3/10; sharp, shooting constant    Temperature: denies fever     What has been tried:     LMP: NA Pregnant: No    Recommended disposition: Go to ED/[facility] Now (Or to Office with PCP Approval)    Care advice provided, patient verbalizes understanding; denies any other questions or concerns; instructed to call back for any new or worsening symptoms. Patient/caller agrees to proceed to nearest [facility]     Attention Provider: Thank you for allowing me to participate in the care of your patient. The patient was connected to triage in response to information provided to the ECC/PSC. Please do not respond through this encounter as the response is not directed to a shared pool.     Reason for Disposition   Chest pain lasting longer than 5 minutes and occurred in last 3 days (72 hours) (Exception: feels exactly the same as previously diagnosed heartburn and has accompanying sour taste in mouth)    Protocols used: Chest Pain-ADULT-[place]

## 2022-10-19 ENCOUNTER — HOSPITAL ENCOUNTER (EMERGENCY)
Age: 22
Discharge: HOME | End: 2022-10-20
Attending: EMERGENCY MEDICINE

## 2022-10-19 ENCOUNTER — HOSPITAL ENCOUNTER (EMERGENCY)
Dept: GENERAL RADIOLOGY | Age: 22
Discharge: HOME | End: 2022-10-22

## 2022-10-19 ENCOUNTER — HOSPITAL ENCOUNTER (EMERGENCY)
Dept: CT IMAGING | Age: 22
Discharge: HOME | End: 2022-10-22

## 2022-10-19 ENCOUNTER — HOSPITAL ENCOUNTER (EMERGENCY)
Dept: ULTRASOUND IMAGING | Age: 22
Discharge: HOME | End: 2022-10-22

## 2022-10-19 ENCOUNTER — NURSE TRIAGE (OUTPATIENT)
Dept: OTHER | Facility: CLINIC | Age: 22
End: 2022-10-19

## 2022-10-19 VITALS
OXYGEN SATURATION: 98 % | WEIGHT: 125 LBS | HEIGHT: 62 IN | BODY MASS INDEX: 23 KG/M2 | DIASTOLIC BLOOD PRESSURE: 63 MMHG | RESPIRATION RATE: 22 BRPM | TEMPERATURE: 98.6 F | SYSTOLIC BLOOD PRESSURE: 103 MMHG | HEART RATE: 104 BPM

## 2022-10-19 DIAGNOSIS — N39.0 URINARY TRACT INFECTION IN FEMALE: ICD-10-CM

## 2022-10-19 DIAGNOSIS — N83.201 CYST OF RIGHT OVARY: ICD-10-CM

## 2022-10-19 DIAGNOSIS — R79.89 ELEVATED LIVER FUNCTION TESTS: ICD-10-CM

## 2022-10-19 DIAGNOSIS — R07.9 CHEST PAIN, UNSPECIFIED TYPE: ICD-10-CM

## 2022-10-19 DIAGNOSIS — R10.12 LEFT UPPER QUADRANT ABDOMINAL PAIN: Primary | ICD-10-CM

## 2022-10-19 LAB
ALBUMIN SERPL-MCNC: 3.5 G/DL (ref 3.5–5)
ALBUMIN/GLOB SERPL: 0.9 {RATIO} (ref 0.4–1.6)
ALP SERPL-CCNC: 195 U/L (ref 50–130)
ALT SERPL-CCNC: 206 U/L (ref 12–65)
ANION GAP SERPL CALC-SCNC: 6 MMOL/L (ref 2–11)
AST SERPL-CCNC: 411 U/L (ref 15–37)
BACTERIA URNS QL MICRO: ABNORMAL /HPF
BASOPHILS # BLD: 0 K/UL (ref 0–0.2)
BASOPHILS NFR BLD: 0 % (ref 0–2)
BILIRUB SERPL-MCNC: 1.3 MG/DL (ref 0.2–1.1)
BUN SERPL-MCNC: 7 MG/DL (ref 6–23)
CALCIUM SERPL-MCNC: 9.1 MG/DL (ref 8.3–10.4)
CASTS URNS QL MICRO: ABNORMAL /LPF
CHLORIDE SERPL-SCNC: 106 MMOL/L (ref 101–110)
CO2 SERPL-SCNC: 26 MMOL/L (ref 21–32)
CREAT SERPL-MCNC: 0.7 MG/DL (ref 0.6–1)
DIFFERENTIAL METHOD BLD: NORMAL
EOSINOPHIL # BLD: 0.1 K/UL (ref 0–0.8)
EOSINOPHIL NFR BLD: 1 % (ref 0.5–7.8)
EPI CELLS #/AREA URNS HPF: ABNORMAL /HPF
ERYTHROCYTE [DISTWIDTH] IN BLOOD BY AUTOMATED COUNT: 12.9 % (ref 11.9–14.6)
GLOBULIN SER CALC-MCNC: 4 G/DL (ref 2.8–4.5)
GLUCOSE SERPL-MCNC: 104 MG/DL (ref 65–100)
HCG UR QL: NEGATIVE
HCT VFR BLD AUTO: 42.3 % (ref 35.8–46.3)
HGB BLD-MCNC: 13.9 G/DL (ref 11.7–15.4)
IMM GRANULOCYTES # BLD AUTO: 0 K/UL (ref 0–0.5)
IMM GRANULOCYTES NFR BLD AUTO: 0 % (ref 0–5)
LIPASE SERPL-CCNC: 97 U/L (ref 73–393)
LYMPHOCYTES # BLD: 1.3 K/UL (ref 0.5–4.6)
LYMPHOCYTES NFR BLD: 14 % (ref 13–44)
MCH RBC QN AUTO: 29.5 PG (ref 26.1–32.9)
MCHC RBC AUTO-ENTMCNC: 32.9 G/DL (ref 31.4–35)
MCV RBC AUTO: 89.8 FL (ref 82–102)
MONOCYTES # BLD: 0.9 K/UL (ref 0.1–1.3)
MONOCYTES NFR BLD: 10 % (ref 4–12)
NEUTS SEG # BLD: 7.1 K/UL (ref 1.7–8.2)
NEUTS SEG NFR BLD: 75 % (ref 43–78)
NRBC # BLD: 0 K/UL (ref 0–0.2)
PLATELET # BLD AUTO: 182 K/UL (ref 150–450)
PMV BLD AUTO: 10.6 FL (ref 9.4–12.3)
POTASSIUM SERPL-SCNC: 4.1 MMOL/L (ref 3.5–5.1)
PROT SERPL-MCNC: 7.5 G/DL (ref 6.3–8.2)
RBC # BLD AUTO: 4.71 M/UL (ref 4.05–5.2)
RBC #/AREA URNS HPF: ABNORMAL /HPF
SODIUM SERPL-SCNC: 138 MMOL/L (ref 133–143)
TROPONIN I SERPL HS-MCNC: <3 PG/ML (ref 0–14)
WBC # BLD AUTO: 9.4 K/UL (ref 4.3–11.1)
WBC URNS QL MICRO: >100 /HPF

## 2022-10-19 PROCEDURE — 93005 ELECTROCARDIOGRAM TRACING: CPT | Performed by: NURSE PRACTITIONER

## 2022-10-19 PROCEDURE — 6360000002 HC RX W HCPCS: Performed by: NURSE PRACTITIONER

## 2022-10-19 PROCEDURE — 74177 CT ABD & PELVIS W/CONTRAST: CPT

## 2022-10-19 PROCEDURE — 81015 MICROSCOPIC EXAM OF URINE: CPT

## 2022-10-19 PROCEDURE — 99285 EMERGENCY DEPT VISIT HI MDM: CPT | Performed by: EMERGENCY MEDICINE

## 2022-10-19 PROCEDURE — 76830 TRANSVAGINAL US NON-OB: CPT

## 2022-10-19 PROCEDURE — 71046 X-RAY EXAM CHEST 2 VIEWS: CPT

## 2022-10-19 PROCEDURE — 96374 THER/PROPH/DIAG INJ IV PUSH: CPT | Performed by: EMERGENCY MEDICINE

## 2022-10-19 PROCEDURE — 81025 URINE PREGNANCY TEST: CPT

## 2022-10-19 PROCEDURE — 84484 ASSAY OF TROPONIN QUANT: CPT

## 2022-10-19 PROCEDURE — 85025 COMPLETE CBC W/AUTO DIFF WBC: CPT

## 2022-10-19 PROCEDURE — 6360000004 HC RX CONTRAST MEDICATION: Performed by: NURSE PRACTITIONER

## 2022-10-19 PROCEDURE — 2580000003 HC RX 258: Performed by: NURSE PRACTITIONER

## 2022-10-19 PROCEDURE — 83690 ASSAY OF LIPASE: CPT

## 2022-10-19 PROCEDURE — 80053 COMPREHEN METABOLIC PANEL: CPT

## 2022-10-19 RX ORDER — MORPHINE SULFATE 4 MG/ML
4 INJECTION, SOLUTION INTRAMUSCULAR; INTRAVENOUS
Status: DISCONTINUED | OUTPATIENT
Start: 2022-10-19 | End: 2022-10-20 | Stop reason: HOSPADM

## 2022-10-19 RX ORDER — MORPHINE SULFATE 4 MG/ML
4 INJECTION, SOLUTION INTRAMUSCULAR; INTRAVENOUS
Status: COMPLETED | OUTPATIENT
Start: 2022-10-19 | End: 2022-10-19

## 2022-10-19 RX ORDER — 0.9 % SODIUM CHLORIDE 0.9 %
1000 INTRAVENOUS SOLUTION INTRAVENOUS ONCE
Status: COMPLETED | OUTPATIENT
Start: 2022-10-19 | End: 2022-10-19

## 2022-10-19 RX ORDER — CEPHALEXIN 500 MG/1
500 CAPSULE ORAL 2 TIMES DAILY
Qty: 14 CAPSULE | Refills: 0 | Status: SHIPPED | OUTPATIENT
Start: 2022-10-19 | End: 2022-10-26

## 2022-10-19 RX ADMIN — IOPAMIDOL 100 ML: 755 INJECTION, SOLUTION INTRAVENOUS at 19:10

## 2022-10-19 RX ADMIN — SODIUM CHLORIDE 1000 ML: 9 INJECTION, SOLUTION INTRAVENOUS at 17:17

## 2022-10-19 RX ADMIN — MORPHINE SULFATE 4 MG: 4 INJECTION INTRAVENOUS at 23:21

## 2022-10-19 RX ADMIN — DIATRIZOATE MEGLUMINE AND DIATRIZOATE SODIUM 15 ML: 660; 100 LIQUID ORAL; RECTAL at 17:48

## 2022-10-19 ASSESSMENT — PAIN - FUNCTIONAL ASSESSMENT: PAIN_FUNCTIONAL_ASSESSMENT: 0-10

## 2022-10-19 ASSESSMENT — PAIN SCALES - GENERAL
PAINLEVEL_OUTOF10: 7
PAINLEVEL_OUTOF10: 7

## 2022-10-19 ASSESSMENT — PATIENT HEALTH QUESTIONNAIRE - PHQ9: SUM OF ALL RESPONSES TO PHQ QUESTIONS 1-9: 12

## 2022-10-19 NOTE — ED PROVIDER NOTES
[facility] Emergency [facility] Provider Note                   PCP:                Tripp Rizzo MD               Age: 24 y.o. Sex: female       ICD-10-CM    1. Left upper quadrant abdominal pain  R10.12       2. Urinary tract infection in female  N39.0       3. Elevated liver function tests  R79.89       4. Chest pain, unspecified type  R07.9       5. Cyst of right ovary  N83.201           DISPOSITION    Discharged    MDM     [facility] as charted. Pt neck is supple, no meningeal signs. Lungs are clear to auscultation, no diminished sounds. Abdomen is soft and periumbilical area and left upper quadrant abdominal tenderness. She is localizing the pain to periumbilical and left upper quadrant of her abdomen. She is denying any lower abdominal or adnexal tenderness. She also endorses chest pain that is been present since 1900 last night. Denies cough or hemoptysis, dizziness or lightheadedness, diaphoresis. Denies all other complaints. She is conversant without increased effort. On my exam she is not tachycardic, tachypneic or hypoxic. Reports numerous episodes of vomiting, but there is no vomiting in the ED today. Well-appearing and appears in no distress, lungs clear, neck is supple. Is a reassuring exam that there is periumbilical and left upper quadrant tenderness. Labs and UA were obtained. Negative urine hCG. UA consistent with urinary tract infection. Bit of white count, normal lipase. LFTs are elevated, uncertain past.  Patient denies recent illness, head review history of liver dysfunction, IV drug use, alcohol use. Status post appendectomy, post cholecystectomy. No right-sided tenderness and no lower abdominal tenderness on serial exam.  Urine EKG and elevated troponin. Feel additional troponin necessary due to duration of patient's pain. Denies abnormal vaginal bleeding or discharge.   CT scan of the abdomen and there is no acute emergent process identified to explain the patient's periumbilical and left upper quadrant tenderness. It was noted that there was a large cyst at the right adnexa. Ultrasound was obtained to rule out torsion, or other emergent process. Findings consistent with a hemorrhagic cyst.  Patient is well pain controlled in the ED. She said no vomiting. Feel she stable for discharge home. Advised to follow-up with PCP and GYN in the coming days for reevaluation, repeat labs, exam of right ovarian cyst.  States she has no gynecologist, so she was provided contact name and number of GYN for follow-up. Denying any abdominal pain and chest pain at this time. Strict return to ED for care instruction provided. Advised to follow-up with PCP in 1-2 days. Return to ED immediately for any new, worsening, concerning symptoms. Patient is very well-hydrated appearing, no distress, pleasant and conversational.  Nontoxic-appearing, tolerating oral intake. Patient is hemodynamically stable and in no acute distress. Patient is not ill-appearing. Discussed patient with attending who reviewed the patient's results and collaborated on care planning. All findings and plans were discussed with the patient. Patient verbalizes desire to be discharged home at this time. All questions answered. Discussed with the patient that an unremarkable evaluation in the ED does not preclude the development or presence of a serious or life threatening condition. Patient was instructed to return immediately for any worsening or change in current symptoms, or if symptoms do not continue to improve. I instructed them to follow up with their primary care provider, own specialist, or medical provider that I am recommending for him within the next 2-3 days     the patient acknowledged understanding plan of care and affirmed approval. Patient is discharged home, with no further complaint.      Orders Placed This Encounter   Procedures    CBC with Diff    CMP    Lipase    Troponin    Diet NPO    POCT Urine Dipstick    POC Pregnancy Urine Qual    EKG 12 Lead    Saline lock IV        Laila Nugent is a 24 y.o. female who presents to the Emergency Department with chief complaint of    Chief Complaint   Patient presents with    Abdominal Pain    Back Pain      HPI  68-year-old female presents to the emergency department with complaint of periumbilical and left upper quadrant abdominal pain, low back pain, nausea with vomiting, chest pain and fever. States symptoms began last night approximately 2100. States she has had 10+ episodes of vomiting since that time. Denies black/bloody/bilious emesis. Denies constipation, diarrhea, or urinary complaint. Does not suspect pregnancy, but does request urine testing for this. States she is having chest pain as she experienced approximately 2 weeks ago and evaluated in the ED for similar. States this pain came same time as abdominal pain. States symptoms been constant since late last night. Denies hemoptysis, cough, dizziness or lightheadedness, back pain, gangrene shoulder blades, exertional symptoms and all other complaints. States she is been losing weight this week. Denies complaints prior to yesterday. History of Samaria-Parkinson-White syndrome. Denies fever/chills, change in appetite, weight loss, decreased oral intake, blurred vision, headaches, neck pain/stiffness. Alert & oriented x 3, afebrile, hemodynamically stable, non-toxic appearing, appears in no distress. Medical/surgical/social history reviewed with the patient. Review of Systems  Constitutional: Negative for fever. Negative for appetite change, chills, diaphoresis and unexpected weight change. HENT: As in HPI     Eyes: Negative. Respiratory: As in HPI   Cardiovascular: Negative. GI/: As in HPI      Musculoskeletal: As in HPI   Skin: Negative. Allergic/Immunologic: Negative. Neurological: Negative.       Past Medical History:   Diagnosis Date    ADHD     Adverse effect of anesthesia 06/2021    chose not to have block pre-op 6/2021- took longer to wake up and woke up screaming in pain and PONV    Anxiety     Asthma     proair prn now- 2/1/22 avg once weekly    Depression     Eczema     dupixent    Esophagitis, eosinophilic     no current medications/infusions    Gastrointestinal disorder     esophageal disorder- previously on infusions    Hx MRSA infection 06/2021    L ankle    Marijuana smoker 02/01/2022    twice daily per pt    Migraine     PONV (postoperative nausea and vomiting) 06/2021    PONV, relieved with zofran 8 mg- per patient    Suspected sleep apnea     \"I'm supposed to be tested\"    Samaria-Parkinson-White syndrome         Past Surgical History:   Procedure Laterality Date    APPENDECTOMY  2016    CHOLECYSTECTOMY  2016    ORTHOPEDIC SURGERY  06/2021    LEFT ANKLE HARDWARE REMOVAL     ORTHOPEDIC SURGERY Left 02/2022    binta in ankle    ORTHOPEDIC SURGERY Left 04/19/2021    ORIF of Bimalleolar ankle fracture    UPPER GASTROINTESTINAL ENDOSCOPY          History reviewed. No pertinent family history. Social History     Socioeconomic History    Marital status: Single     Spouse name: None    Number of children: None    Years of education: None    Highest education level: None   Tobacco Use    Smoking status: Former    Smokeless tobacco: Never   Substance and Sexual Activity    Alcohol use: No    Drug use: Not Currently     Frequency: 14.0 times per week     Types: Marijuana (Weed)         Latex and Prednisone     Previous Medications    ALBUTEROL SULFATE  (90 BASE) MCG/ACT INHALER    Inhale 2 puffs into the lungs every 4 hours as needed    DICLOFENAC (VOLTAREN) 50 MG EC TABLET    Take 1 tablet by mouth 2 times daily    DUPILUMAB (DUPIXENT) 300 MG/2ML SOPN INJECTION    Once every 2 weeks.  On Thursday    IBUPROFEN (ADVIL;MOTRIN) 200 MG TABLET    Take 400 mg by mouth every 6 hours as needed    LISDEXAMFETAMINE DIMESYLATE 60 MG CAPS    Take 1 capsule by mouth daily as needed. MINOCYCLINE (MINOCIN;DYNACIN) 100 MG CAPSULE    Take 100 mg by mouth daily    ONDANSETRON (ZOFRAN) 4 MG TABLET    Take 4 mg by mouth every 8 hours as needed    RIZATRIPTAN (MAXALT) 10 MG TABLET    Take 10 mg by mouth once as needed    SERTRALINE (ZOLOFT) 100 MG TABLET    Take 100 mg by mouth daily as needed    TRIAMCINOLONE (KENALOG) 0.1 % OINTMENT    daily as needed        Vitals signs and nursing note reviewed. Patient Vitals for the past 4 hrs:   Temp Pulse Resp BP SpO2   10/19/22 1524 98.6 °F (37 °C) 90 22 125/79 98 %          Physical Exam   Constitutional: Oriented to person, place, and time. Appears well-developed and well-nourished. No distress. HENT:    Head: Normocephalic and atraumatic. Right Ear: External ear normal.    Left Ear: External ear normal.    Nose: Nose normal.    Mouth/Throat: Mouth normal. Uvula midline, no erythema/exudates/edema. No drooling, no voice change. No pain with ROM of neck. Eyes: Conjunctivae are normal.   Neck: Supple. No tracheal deviation. Not tender, not rigid, no menningeal signs. Cardiovascular: Normal rate, intact distal pulses. Pulmonary/Chest: No respiratory distress. Lungs are clear to auscultation with abdomen instrumentation sounds. Not hypoxic or tachypneic, is conversant length with no increased respiratory effort. Abdominal: Soft. Periumbilical left upper quadrant abdominal tenderness are noted. There is no guarding rebound or rigidity. No flank or CVA tenderness. Negative Mosqueda sign,  no tenderness at McBurney's point. Normoactive bowel sounds. Musculoskeletal: No obvious deformity, erythema, edema. Neurological: Alert and oriented to person, place, and time. Skin:  No abrasion, no lesion, no petechiae and no rash noted. Not diaphoretic. No cyanosis, erythema, or pallor. Psychiatric: Normal mood and affect. Behavior is normal.    Nursing note and vitals reviewed. Procedures  EKG:  Interpreted by ED attending in absence of cardiologist.  Normal sinus rhythm, normal rate. No ischemic change. Labs Reviewed   COMPREHENSIVE METABOLIC PANEL - Abnormal; Notable for the following components:       Result Value    Glucose 104 (*)     Total Bilirubin 1.3 (*)      (*)      (*)     Alk Phosphatase 195 (*)     All other components within normal limits   URINALYSIS, MICRO - Abnormal; Notable for the following components:    WBC, UA >100 (*)     All other components within normal limits   CBC WITH AUTO DIFFERENTIAL   LIPASE   TROPONIN   POC PREGNANCY UR-QUAL   POC PREGNANCY UR-QUAL        US PELVIS COMPLETE   Final Result      1. A 5 cm complex cyst in the right ovary, with appearance consistent with   hemorrhagic cyst. There is color Doppler flow to the right adnexa. No ultrasound   evidence to suggest torsion. Follow-up pelvic ultrasound in 6-8 weeks   recommended to evaluate for resolution. 2. Left ovary and the uterus appear within normal limits. DC4                  CT ABDOMEN PELVIS W IV CONTRAST Additional Contrast? Oral   Final Result      1. An approximately 5 cm cystic mass in the right adnexa, suspicious for   hemorrhagic ovarian cyst. Follow-up pelvic ultrasound could be considered, to   exclude the possibility of torsion. 2. No evidence of colitis, diverticulitis or bowel obstruction. No   hydronephrosis. 3. Cholecystectomy and appendectomy. XR CHEST (2 VW)   Final Result   No consolidation. Voice dictation software was used during the making of this note. This software is not perfect and grammatical and other typographical errors may be present. This note has not been completely proofread for errors.          Evon Leonardo, APRN - CNP  10/20/22 0011

## 2022-10-19 NOTE — LETTER
[facility] EMERGENCY DEPT  [address] [address]  Phone: [phone]  Fax: [phone]               October 20, 2022    Patient: Godfrey Eisenmenger   YOB: 2000   Date of Visit: 10/19/2022       To Whom It May Concern:    Zelalem Montejo was seen and treated in our emergency department on 10/19/2022. She may return to work on 10/21/2022.       Sincerely,

## 2022-10-19 NOTE — TELEPHONE ENCOUNTER
Received call from Milton Lizama at [facility] with Red Flag Complaint. Subjective: Caller states \"She is having severe abdominal pain and back pain. \"     Speaking to mom Shilpi Hurt. Mom requested call be placed to patient directly. Confirmed number and called patient. Rest of triage done with patient on the phone. Current Symptoms: Abdominal pain above belly button and to the right. Back pain- last few years- worsening- lower back- radiates down right side. Onset: Abominal pain a few days ago; worsening    Associated Symptoms: nausea, vomiting    Pain Severity: 8/10; throbbing; constant- abdominal pain, 10/10- back pain    Temperature: 101. 3    What has been tried: [place] two old oxycodone last night     LMP:  going to start in a few days. Pregnant: No    Recommended disposition: Go to ED/[facility] Now (Or to Office with PCP Approval)    Care advice provided, patient verbalizes understanding; denies any other questions or concerns; instructed to call back for any new or worsening symptoms. Patient agreed to go to nearest ED for evaluation. Patient was asking for a doctor's note from the practice. Explained since they are not yet established they should ask for a note at the ED during discharge. Attention Provider: Thank you for allowing me to participate in the care of your patient. The patient was connected to triage in response to information provided to the ECC/PSC. Please do not respond through this encounter as the response is not directed to a shared pool.     Reason for Disposition   Constant abdominal pain lasting > 2 hours    Protocols used: Abdominal Pain - Female-ADULT-[place]

## 2022-10-19 NOTE — ED TRIAGE NOTES
Patient ambulatory to triage. Patient c\op abdominakl and back pain. Patient states she recently had a fever of 100.1. Patient also states she has lost 12 pounds in the past week.

## 2022-10-20 LAB
EKG ATRIAL RATE: 76 BPM
EKG DIAGNOSIS: NORMAL
EKG P AXIS: 50 DEGREES
EKG P-R INTERVAL: 158 MS
EKG Q-T INTERVAL: 362 MS
EKG QRS DURATION: 82 MS
EKG QTC CALCULATION (BAZETT): 407 MS
EKG R AXIS: 76 DEGREES
EKG T AXIS: 42 DEGREES
EKG VENTRICULAR RATE: 76 BPM

## 2022-10-20 NOTE — DISCHARGE INSTRUCTIONS
Take medications as prescribed. Follow-up with recommended provider in the next 1-2 days. Return to the ED immediately for any new, worsening, concerning symptoms; or for danger signs as discussed. Follow-up with your PCP in 1-2 days and have repeat labs to recheck liver function tests, follow-up further management/imaging for CT and ultrasound findings as follows:     US PELVIS COMPLETE (Final result)  Result time 10/19/22 23:26:12  Final result by Suhail Mcdonough MD (10/19/22 23:26:12)                Impression:      1. A 5 cm complex cyst in the right ovary, with appearance consistent with   hemorrhagic cyst. There is color Doppler flow to the right adnexa. No ultrasound   evidence to suggest torsion. Follow-up pelvic ultrasound in 6-8 weeks   recommended to evaluate for resolution. 2. Left ovary and the uterus appear within normal limits. DC4                   Narrative:    Clinical history: Right adnexal cystic lesion noted in the prior CT study. Evaluate for ovarian torsion. TECHNIQUE: Transabdominal and transvaginal pelvic ultrasound. Negative pregnancy   test.     TECHNIQUE: Transabdominal and transvaginal pelvic ultrasound. COMPARISON: CT earlier today. FINDINGS:       Transabdominal findings: The uterus is not well visualized. Transvaginal findings: The uterus is normal in echogenicity, contour and size and measures 7.2 x 3.9 x   5.4 cm in. Endometrial echo complex measures 1 cm. There is a 4.1 x 4.9 x 5.0 cm complex cystic mass in the right adnexa with mixed   cystic and isoechoic components. There is no intrinsic color Doppler flow. Findings suggest hemorrhagic ovarian cyst. Right ovarian tissue is displaced   peripherally. Combination of the complex cyst and the right ovarian tissue   measures 6.0 x 4.7 x 6.0 cm. There is color Doppler flow to the right adnexa. Left ovary is unremarkable and measures 2.7 x 1.2 x 1.5 cm.      There is no significant free fluid. CT ABDOMEN PELVIS W IV CONTRAST Additional Contrast? Oral (Final result)  Result time 10/19/22 21:41:38  Final result by Vera Medina MD (10/19/22 21:41:38)                Impression:      1. An approximately 5 cm cystic mass in the right adnexa, suspicious for   hemorrhagic ovarian cyst. Follow-up pelvic ultrasound could be considered, to   exclude the possibility of torsion. 2. No evidence of colitis, diverticulitis or bowel obstruction. No   hydronephrosis. 3. Cholecystectomy and appendectomy. Narrative:    CT abdomen and pelvis with contrast     COMPARISON: None. INDICATION: Periumbilical and left upper quadrant pain. Tenderness. Elevated   LFTs. TECHNIQUE: CT imaging was performed of the abdomen and pelvis following the   uncomplicated administration of intravenous contrast (Isovue 370, 100 mL). Oral   contrast was administered. Radiation dose reduction techniques were used for   this study:  Our CT scanners use one or all of the following: Automated exposure   control, adjustment of the mA and/or kVp according to patient's size, iterative   reconstruction. FINDINGS:     Visualized lung bases are unremarkable. Abdomen findings:     Gallbladder is surgically absent. The liver, pancreas, spleen, adrenal glands,   kidneys, and abdominal aorta are unremarkable. No evidence of lymphadenopathy. Stomach is normal in contour. Small bowel loops are normal in caliber. No small   bowel obstruction. Pelvic findings: There is an approximately 5 cm cystic mass within the deep pelvis, slightly to   the right of midline. The uterus is not well evaluated. Urinary bladder is   normal in contour. The colon is normal in course and caliber. Appendix is   surgically absent. There is no free air or free fluid.  Surrounding bones are intact

## 2022-10-20 NOTE — ED NOTES
I have reviewed discharge instructions with the patient. The patient verbalized understanding. Patient left ED via Discharge Method: ambulatory to Home with self. Opportunity for questions and clarification provided. Patient given 1 scripts. To continue your aftercare when you leave the hospital, you may receive an automated call from our care team to check in on how you are doing. This is a free service and part of our promise to provide the best care and service to meet your aftercare needs.  If you have questions, or wish to unsubscribe from this service please call [phone]. Thank you for Choosing our [facility] Emergency Department.         Angus Conway RN  10/19/22 [id number]

## 2022-10-27 ENCOUNTER — OFFICE VISIT (OUTPATIENT)
Dept: INTERNAL MEDICINE CLINIC | Facility: CLINIC | Age: 22
End: 2022-10-27
Payer: COMMERCIAL

## 2022-10-27 VITALS
BODY MASS INDEX: 23.74 KG/M2 | HEART RATE: 103 BPM | OXYGEN SATURATION: 97 % | WEIGHT: 129 LBS | HEIGHT: 62 IN | DIASTOLIC BLOOD PRESSURE: 64 MMHG | SYSTOLIC BLOOD PRESSURE: 102 MMHG

## 2022-10-27 DIAGNOSIS — J45.20 MILD INTERMITTENT ASTHMA WITHOUT COMPLICATION: ICD-10-CM

## 2022-10-27 DIAGNOSIS — N83.202 LEFT OVARIAN CYST: Primary | ICD-10-CM

## 2022-10-27 DIAGNOSIS — R74.01 TRANSAMINITIS: ICD-10-CM

## 2022-10-27 PROCEDURE — 99204 OFFICE O/P NEW MOD 45 MIN: CPT | Performed by: STUDENT IN AN ORGANIZED HEALTH CARE EDUCATION/TRAINING PROGRAM

## 2022-10-27 RX ORDER — ALBUTEROL SULFATE 90 UG/1
2 AEROSOL, METERED RESPIRATORY (INHALATION) EVERY 4 HOURS PRN
Qty: 18 G | Refills: 2 | Status: SHIPPED | OUTPATIENT
Start: 2022-10-27

## 2022-10-27 ASSESSMENT — PATIENT HEALTH QUESTIONNAIRE - PHQ9
SUM OF ALL RESPONSES TO PHQ9 QUESTIONS 1 & 2: 1
SUM OF ALL RESPONSES TO PHQ QUESTIONS 1-9: 1
SUM OF ALL RESPONSES TO PHQ QUESTIONS 1-9: 1
2. FEELING DOWN, DEPRESSED OR HOPELESS: 0
SUM OF ALL RESPONSES TO PHQ QUESTIONS 1-9: 1
SUM OF ALL RESPONSES TO PHQ QUESTIONS 1-9: 1
1. LITTLE INTEREST OR PLEASURE IN DOING THINGS: 1

## 2022-10-27 ASSESSMENT — ENCOUNTER SYMPTOMS
ABDOMINAL PAIN: 0
VOMITING: 0
SHORTNESS OF BREATH: 0
NAUSEA: 0

## 2022-10-27 NOTE — PROGRESS NOTES
SUBJECTIVE:   Leslee Shetty is a 24 y.o. female seen for a visit regarding   Chief Complaint   Patient presents with    New Patient     New patient here to get established. Other     In ED last week. Was told to get tested for Lupus        HPI: 4    Dr. Lilia Prakash was her pediatrician. She had cholecystectomy at age 13. Also had appendectomy. History of eosinophilic esophagitis, food allergy, eczema, asthma as a child, treated with Reslizumab infusions. Seen in the ED 10/19/22, treated for UTI, urinary symptoms improving. Had large hemorrhagic cyst on right ovary, was told to follow up with gynecology. Labs pertinent for bilirubin 1.3, , ,     Having lots of episodes recently: weight loss/fluctuating, rash in butterfly distribution on face, leg pain. No joint swelling. No family history of lupus, RA. Low grade fever. Still having some nausea. Left hand joint stiffness in AM lasts a few minutes. History of migraines: noted, saw neurology Dr. Sally Carreon in the past.    History of ?whitney parkinson white/ventricular pre-excitation: noted on ECG strip during EGD in 2014. Saw Dr. Argelia Bueno, cardiology in the past.    Sees unknown dermatologist for dupixent injections for eczema    History of asthma: having some wheezing in the past week, needing to use inhaler. Past Medical History, Past Surgical History, Family history, Social History, and Medications were all reviewed with the patient today and updated as necessary. Current Outpatient Medications   Medication Sig Dispense Refill    albuterol sulfate HFA (PROVENTIL;VENTOLIN;PROAIR) 108 (90 Base) MCG/ACT inhaler Inhale 2 puffs into the lungs every 4 hours as needed for Wheezing or Shortness of Breath 18 g 2    dupilumab (DUPIXENT) 300 MG/2ML SOPN injection Once every 2 weeks.  On Thursday      rizatriptan (MAXALT) 10 MG tablet Take 10 mg by mouth once as needed      triamcinolone (KENALOG) 0.1 % ointment daily as needed diclofenac (VOLTAREN) 50 MG EC tablet Take 1 tablet by mouth 2 times daily 60 tablet 3     No current facility-administered medications for this visit. Allergies   Allergen Reactions    Latex Rash    Prednisone Rash and Shortness Of Breath     Patient Active Problem List   Diagnosis    Chest pain    Hypotension    Wound infection complicating hardware (HCC)    Postoperative wound infection     Social History     Tobacco Use    Smoking status: Never    Smokeless tobacco: Never   Substance Use Topics    Alcohol use: No          Review of Systems   Constitutional:  Negative for chills and fever. HENT:  Negative for congestion. Eyes:  Negative for visual disturbance. Respiratory:  Negative for shortness of breath. Cardiovascular:  Negative for chest pain. Gastrointestinal:  Negative for abdominal pain, nausea and vomiting. Musculoskeletal:  Negative for arthralgias. Neurological:  Negative for syncope and headaches. OBJECTIVE:    Vitals:    10/27/22 1527   BP: 102/64   Pulse: (!) 103   SpO2: 97%   Weight: 129 lb (58.5 kg)   Height: 5' 2\" (1.575 m)        Physical Exam  Constitutional:       General: She is not in acute distress. Appearance: Normal appearance. HENT:      Head: Normocephalic and atraumatic. Eyes:      Extraocular Movements: Extraocular movements intact. Cardiovascular:      Rate and Rhythm: Normal rate and regular rhythm. Heart sounds: Normal heart sounds. Pulmonary:      Breath sounds: Normal breath sounds. Abdominal:      General: There is no distension. Palpations: Abdomen is soft. There is no mass. Tenderness: There is abdominal tenderness (diffuse tenderness to palpation more on right side). There is no guarding or rebound. Hernia: No hernia is present. Musculoskeletal:         General: No deformity. Skin:     General: Skin is warm and dry. Neurological:      Mental Status: She is alert. Mental status is at baseline.    Psychiatric: Mood and Affect: Mood normal.          Medical problems and test results were reviewed with the patient today.      Recent Results (from the past 672 hour(s))   CBC with Diff    Collection Time: 10/19/22  3:30 PM   Result Value Ref Range    WBC 9.4 4.3 - 11.1 K/uL    RBC 4.71 4.05 - 5.2 M/uL    Hemoglobin 13.9 11.7 - 15.4 g/dL    Hematocrit 42.3 35.8 - 46.3 %    MCV 89.8 82.0 - 102.0 FL    MCH 29.5 26.1 - 32.9 PG    MCHC 32.9 31.4 - 35.0 g/dL    RDW 12.9 11.9 - 14.6 %    Platelets 928 054 - 113 K/uL    MPV 10.6 9.4 - 12.3 FL    nRBC 0.00 0.0 - 0.2 K/uL    Differential Type AUTOMATED      Seg Neutrophils 75 43 - 78 %    Lymphocytes 14 13 - 44 %    Monocytes 10 4.0 - 12.0 %    Eosinophils % 1 0.5 - 7.8 %    Basophils 0 0.0 - 2.0 %    Immature Granulocytes 0 0.0 - 5.0 %    Segs Absolute 7.1 1.7 - 8.2 K/UL    Absolute Lymph # 1.3 0.5 - 4.6 K/UL    Absolute Mono # 0.9 0.1 - 1.3 K/UL    Absolute Eos # 0.1 0.0 - 0.8 K/UL    Basophils Absolute 0.0 0.0 - 0.2 K/UL    Absolute Immature Granulocyte 0.0 0.0 - 0.5 K/UL   CMP    Collection Time: 10/19/22  3:30 PM   Result Value Ref Range    Sodium 138 133 - 143 mmol/L    Potassium 4.1 3.5 - 5.1 mmol/L    Chloride 106 101 - 110 mmol/L    CO2 26 21 - 32 mmol/L    Anion Gap 6 2 - 11 mmol/L    Glucose 104 (H) 65 - 100 mg/dL    BUN 7 6 - 23 MG/DL    Creatinine 0.70 0.6 - 1.0 MG/DL    Est, Glom Filt Rate >60 >60 ml/min/1.73m2    Calcium 9.1 8.3 - 10.4 MG/DL    Total Bilirubin 1.3 (H) 0.2 - 1.1 MG/DL     (H) 12 - 65 U/L     (H) 15 - 37 U/L    Alk Phosphatase 195 (H) 50 - 130 U/L    Total Protein 7.5 6.3 - 8.2 g/dL    Albumin 3.5 3.5 - 5.0 g/dL    Globulin 4.0 2.8 - 4.5 g/dL    Albumin/Globulin Ratio 0.9 0.4 - 1.6     Lipase    Collection Time: 10/19/22  3:30 PM   Result Value Ref Range    Lipase 97 73 - 393 U/L   Troponin    Collection Time: 10/19/22  3:30 PM   Result Value Ref Range    Troponin, High Sensitivity <3.0 0 - 14 pg/mL   EKG 12 Lead    Collection Time: 10/19/22  5:18 PM   Result Value Ref Range    Ventricular Rate 76 BPM    Atrial Rate 76 BPM    P-R Interval 158 ms    QRS Duration 82 ms    Q-T Interval 362 ms    QTc Calculation (Bazett) 407 ms    P Axis 50 degrees    R Axis 76 degrees    T Axis 42 degrees    Diagnosis !! AGE AND GENDER SPECIFIC ECG ANALYSIS !!    POC Pregnancy Urine Qual    Collection Time: 10/19/22  5:24 PM   Result Value Ref Range    Preg Test, Ur Negative NEG     Urinalysis, Micro    Collection Time: 10/19/22  5:27 PM   Result Value Ref Range    WBC, UA >100 (H) 0 /hpf    RBC, UA 0-5 0 /hpf    Epithelial Cells UA 5-10 0 /hpf    BACTERIA, URINE TOO NUMEROUS TO COUNT 0 /hpf    Casts 2-5 0 /lpf       ASSESSMENT and PLAN     1. Left ovarian cyst  -     [facility] [address] [facility]  -     STEVEN, Direct, w/Reflex; Future  -     CCP Antibodies, IGG/IGA; Future  -     C-Reactive Protein; Future  -     Rheumatoid Factor; Future  -     Sedimentation Rate; Future  -     Comprehensive Metabolic Panel; Future  -     HIV 1/2 Ag/Ab, 4TH Generation,W Rflx Confirm; Future  2. Mild intermittent asthma without complication  -     albuterol sulfate HFA (PROVENTIL;VENTOLIN;PROAIR) 108 (90 Base) MCG/ACT inhaler; Inhale 2 puffs into the lungs every 4 hours as needed for Wheezing or Shortness of Breath, Disp-18 g, R-2Normal  3. Transaminitis  -     Hepatitis C Antibody; Future  -     Lipid Panel; Future  -     Hepatitis B Core Antibody, Total; Future  -     Hepatitis B Surface Antibody; Future  -     Hepatitis B Surface Antigen; Future     Recheck labs. Unclear etiology of symptoms, autoimmune? Low suspicion for lupus. Check viral panel. Refer to gynecology for ovarian cyst.    Return in about 6 weeks (around 12/8/2022) for routine follow up, with labs prior.      Fallon Burciaga MD

## 2022-11-02 DIAGNOSIS — G43.719 INTRACTABLE CHRONIC MIGRAINE WITHOUT AURA AND WITHOUT STATUS MIGRAINOSUS: Primary | ICD-10-CM

## 2022-11-02 RX ORDER — RIZATRIPTAN BENZOATE 10 MG/1
10 TABLET ORAL
Qty: 9 TABLET | Refills: 1 | Status: SHIPPED | OUTPATIENT
Start: 2022-11-02 | End: 2022-11-02

## 2022-11-25 ENCOUNTER — HOSPITAL ENCOUNTER (EMERGENCY)
Dept: ULTRASOUND IMAGING | Age: 22
Discharge: HOME | End: 2022-11-28
Payer: COMMERCIAL

## 2022-11-25 ENCOUNTER — HOSPITAL ENCOUNTER (EMERGENCY)
Age: 22
Discharge: HOME | End: 2022-11-25
Attending: EMERGENCY MEDICINE | Admitting: EMERGENCY MEDICINE
Payer: COMMERCIAL

## 2022-11-25 VITALS
RESPIRATION RATE: 15 BRPM | DIASTOLIC BLOOD PRESSURE: 62 MMHG | TEMPERATURE: 98 F | OXYGEN SATURATION: 100 % | HEIGHT: 62 IN | SYSTOLIC BLOOD PRESSURE: 118 MMHG | WEIGHT: 125 LBS | HEART RATE: 71 BPM | BODY MASS INDEX: 23 KG/M2

## 2022-11-25 DIAGNOSIS — N83.202 CYST OF LEFT OVARY: Primary | ICD-10-CM

## 2022-11-25 DIAGNOSIS — R10.30 LOWER ABDOMINAL PAIN: ICD-10-CM

## 2022-11-25 LAB
APPEARANCE UR: ABNORMAL
BILIRUB UR QL: NEGATIVE
COLOR UR: ABNORMAL
GLUCOSE UR STRIP.AUTO-MCNC: NEGATIVE MG/DL
HCG UR QL: NEGATIVE
HGB UR QL STRIP: NEGATIVE
KETONES UR QL STRIP.AUTO: ABNORMAL MG/DL
LEUKOCYTE ESTERASE UR QL STRIP.AUTO: NEGATIVE
NITRITE UR QL STRIP.AUTO: NEGATIVE
PH UR STRIP: 5.5 [PH] (ref 5–9)
PROT UR STRIP-MCNC: NEGATIVE MG/DL
SP GR UR REFRACTOMETRY: 1.03 (ref 1–1.02)
UROBILINOGEN UR QL STRIP.AUTO: 1 EU/DL (ref 0.2–1)

## 2022-11-25 PROCEDURE — 81025 URINE PREGNANCY TEST: CPT

## 2022-11-25 PROCEDURE — 96372 THER/PROPH/DIAG INJ SC/IM: CPT

## 2022-11-25 PROCEDURE — 6360000002 HC RX W HCPCS: Performed by: EMERGENCY MEDICINE

## 2022-11-25 PROCEDURE — 81003 URINALYSIS AUTO W/O SCOPE: CPT

## 2022-11-25 PROCEDURE — 99284 EMERGENCY DEPT VISIT MOD MDM: CPT

## 2022-11-25 PROCEDURE — 76856 US EXAM PELVIC COMPLETE: CPT

## 2022-11-25 RX ORDER — KETOROLAC TROMETHAMINE 30 MG/ML
30 INJECTION, SOLUTION INTRAMUSCULAR; INTRAVENOUS
Status: COMPLETED | OUTPATIENT
Start: 2022-11-25 | End: 2022-11-25

## 2022-11-25 RX ORDER — NAPROXEN 500 MG/1
500 TABLET ORAL 2 TIMES DAILY WITH MEALS
Qty: 40 TABLET | Refills: 0 | Status: SHIPPED | OUTPATIENT
Start: 2022-11-25 | End: 2022-12-15

## 2022-11-25 RX ORDER — ACETAMINOPHEN AND CODEINE PHOSPHATE 300; 30 MG/1; MG/1
1 TABLET ORAL EVERY 6 HOURS PRN
Qty: 20 TABLET | Refills: 0 | Status: SHIPPED | OUTPATIENT
Start: 2022-11-25 | End: 2022-11-30

## 2022-11-25 RX ADMIN — KETOROLAC TROMETHAMINE 30 MG: 30 INJECTION, SOLUTION INTRAMUSCULAR; INTRAVENOUS at 03:29

## 2022-11-25 ASSESSMENT — PAIN SCALES - GENERAL
PAINLEVEL_OUTOF10: 4
PAINLEVEL_OUTOF10: 8
PAINLEVEL_OUTOF10: 8

## 2022-11-25 ASSESSMENT — PAIN - FUNCTIONAL ASSESSMENT: PAIN_FUNCTIONAL_ASSESSMENT: 0-10

## 2022-11-25 ASSESSMENT — PAIN DESCRIPTION - DESCRIPTORS
DESCRIPTORS: SHARP
DESCRIPTORS: SHARP;ACHING

## 2022-11-25 ASSESSMENT — PAIN DESCRIPTION - FREQUENCY: FREQUENCY: INTERMITTENT

## 2022-11-25 ASSESSMENT — PAIN DESCRIPTION - ORIENTATION
ORIENTATION: LOWER
ORIENTATION: LOWER

## 2022-11-25 ASSESSMENT — PAIN DESCRIPTION - LOCATION
LOCATION: ABDOMEN;BACK

## 2022-11-25 ASSESSMENT — ENCOUNTER SYMPTOMS: ABDOMINAL PAIN: 1

## 2022-11-25 NOTE — ED PROVIDER NOTES
Emergency Department Provider Note                   PCP:                Nathanael Parham MD               Age: 25 y.o. Sex: female       ICD-10-CM    1. Cyst of left ovary  N83.202 acetaminophen-codeine (TYLENOL/CODEINE #3) 300-30 MG per tablet      2. Lower abdominal pain  R10.30 acetaminophen-codeine (TYLENOL/CODEINE #3) 300-30 MG per tablet          DISPOSITION Decision To Discharge 11/25/2022 03:44:32 AM       MDM  Number of Diagnoses or Management Options  Cyst of left ovary  Lower abdominal pain  Diagnosis management comments: IUP, ectopic pregnancy,     musculoskeletal pain, ovarian cyst, endometriosis, uterine fibroid, ovarian torsion,    UTI, Bacterial vaginitis vaginosis, yeast vaginitis, Trichomonas, pelvic inflammatory  disease, cervicitis, STD, gonorrhea, chlamydia, syphilis, HIV         Amount and/or Complexity of Data Reviewed  Clinical lab tests: ordered and reviewed  Tests in the radiology section of CPT®: ordered and reviewed  Tests in the medicine section of CPT®: ordered and reviewed  Independent visualization of images, tracings, or specimens: yes         ED Course as of 11/25/22 0348 Fri Nov 25, 2022   0310 US PELVIS COMPLETE  IMPRESSION:  1. Retroverted uterus. 2. 1.3 cm complex right ovary cyst, decreased in size from the prior ultrasound. 3. New 3.3 cm left ovary cyst with minimal internal debris, favored to be a  follicle. [KH]      ED Course User Index  [KH] Alfredo Cornelius DO        Orders Placed This Encounter   Procedures    US PELVIS COMPLETE    Urinalysis w rflx microscopic    POC PREGNANCY UR-QUAL    POC Pregnancy Urine Qual        Medications   ketorolac (TORADOL) injection 30 mg (30 mg IntraMUSCular Given 11/25/22 0329)       New Prescriptions    ACETAMINOPHEN-CODEINE (TYLENOL/CODEINE #3) 300-30 MG PER TABLET    Take 1 tablet by mouth every 6 hours as needed for Pain for up to 5 days. Intended supply: 5 days.  Take lowest dose possible to manage pain    NAPROXEN (NAPROSYN) 500 MG TABLET    Take 1 tablet by mouth 2 times daily (with meals) for 20 days        Jose Arreaga is a 25 y.o. female who presents to the Emergency Department with chief complaint of    Chief Complaint   Patient presents with    Abdominal Pain      Patient is a 27-year-old female presenting to the emergency department today complaining of right lower quadrant abdominal pain and suprapubic abdominal pain starting severe about 1 hour prior to arrival.  The patient states that over the last 24 to 48 hours had some mild pain. She was seen and evaluated here in the emergency department on 10/19/2022 had imaging obtained which showed an adnexal mass. The patient was treated for urinary tract patient states that her symptoms improved for a few days after the treatment but then now she is having worsening symptoms. The patient did not take any medicine prior to arrival to the emergency department. The patient has an OB appointment in December but because of the increased severity of the pain wanted to be reevaluated. Last menstrual cycle 1 month ago. All other systems reviewed and are negative unless otherwise stated in the history of present illness section. Review of Systems   Gastrointestinal:  Positive for abdominal pain. All other systems reviewed and are negative.     Past Medical History:   Diagnosis Date    ADHD     Adverse effect of anesthesia 06/2021    chose not to have block pre-op 6/2021- took longer to wake up and woke up screaming in pain and PONV    Anxiety     Asthma     proair prn now- 2/1/22 avg once weekly    Depression     Eczema     dupixent    Esophagitis, eosinophilic     no current medications/infusions    Gastrointestinal disorder     esophageal disorder- previously on infusions    Hx MRSA infection 06/2021    L ankle    Marijuana smoker 02/01/2022    twice daily per pt    Migraine     PONV (postoperative nausea and vomiting) 06/2021    PONV, relieved with zofran 8 mg- per patient    Suspected sleep apnea     \"I'm supposed to be tested\"    Samaria-Parkinson-White syndrome         Past Surgical History:   Procedure Laterality Date    APPENDECTOMY  2016    CHOLECYSTECTOMY  2016    ORTHOPEDIC SURGERY  06/2021    LEFT ANKLE HARDWARE REMOVAL     ORTHOPEDIC SURGERY Left 02/2022    binta in ankle    ORTHOPEDIC SURGERY Left 04/19/2021    ORIF of Bimalleolar ankle fracture    ORTHOPEDIC SURGERY  02/2022    binta in ankle removed    UPPER GASTROINTESTINAL ENDOSCOPY          Family History   Problem Relation Age of Onset    Hypertension Mother     Diabetes type 2  Mother     Hypertension Father         Social History     Socioeconomic History    Marital status: Single   Tobacco Use    Smoking status: Never    Smokeless tobacco: Never   Vaping Use    Vaping Use: Never used   Substance and Sexual Activity    Alcohol use: No    Drug use: Yes     Types: Marijuana (Weed)     Comment: a few times a week    Sexual activity: Yes     Partners: Female        Allergies: Latex and Prednisone    Previous Medications    ALBUTEROL SULFATE HFA (PROVENTIL;VENTOLIN;PROAIR) 108 (90 BASE) MCG/ACT INHALER    Inhale 2 puffs into the lungs every 4 hours as needed for Wheezing or Shortness of Breath    DUPILUMAB (DUPIXENT) 300 MG/2ML SOPN INJECTION    Once every 2 weeks. On Thursday    RIZATRIPTAN (MAXALT) 10 MG TABLET    Take 1 tablet by mouth once as needed for Migraine    TRIAMCINOLONE (KENALOG) 0.1 % OINTMENT    daily as needed        Vitals signs and nursing note reviewed. Patient Vitals for the past 4 hrs:   Temp Pulse Resp BP SpO2   11/25/22 0107 97.4 °F (36.3 °C) (!) 112 15 112/72 97 %          Physical Exam     GENERAL:The patient has Body mass index is 22.86 kg/m². Well-hydrated. No acute distress  VITAL SIGNS: Heart rate, blood pressure, respiratory rate reviewed as recorded in  nurse's notes  EYES: Pupils reactive. Extraocular motion intact. No conjunctival redness or drainage.   NECK: Supple, no meningeal signs. Trachea midline. No masses or thyromegaly. LUNGS: Breath sounds clear and equal bilaterally no accessory muscle use. CHEST: No deformity  CARDIOVASCULAR: Regular rate and rhythm  ABDOMEN: Soft no guarding or rigidity present. Positive bowel sounds in all 4 quadrants. The patient has diffuse abdominal tenderness everywhere I pushed. Increased tenderness in the right lower quadrant and suprapubic region. Negative Mookie Saunas, McBurney and Rovsing signs. EXTREMITIES: No clubbing or cyanosis. No joint swelling. Normal muscle tone. No  restricted range of motion appreciated. NEUROLOGIC: Sensation is grossly intact. Cranial nerve exam reveals face is  symmetrical, tongue is midline speech is clear. SKIN: No rash or petechiae. Good skin turgor palpated. PSYCHIATRIC: Alert and oriented. Appropriate behavior and judgment. Procedures      Results for orders placed or performed during the hospital encounter of 11/25/22   US PELVIS COMPLETE    Narrative    EXAM: Ultrasound of the pelvis. INDICATION: Pelvic pain. COMPARISON: Prior ultrasound pelvis October 19, 2022. TECHNIQUE: Transabdominal and transvaginal ultrasound images of the pelvis were  obtained. FINDINGS: The uterus is retroverted, measuring 7.7 x 4.2 x 5.1 cm. The  endometrial stripe is within normal limits, measuring 1.7 cm in thickness. There is trace free fluid in the pelvis. The right ovary measures 1.9 x 2.6 x  2.0 cm and contains a 1.1 x 1.2 x 1.3 cm complex cyst, which previously measured  4.1 x 4.9 x 5.0 cm. The left ovary measures 2.6 x 3.9 x 2.5 cm and contains a  new 3.3 x 2.7 x 3.0 cm cyst with a few internal echoes. There is vascular flow  in both ovaries. No adnexal masses are seen. Impression    1. Retroverted uterus. 2. 1.3 cm complex right ovary cyst, decreased in size from the prior ultrasound. 3. New 3.3 cm left ovary cyst with minimal internal debris, favored to be a  follicle.     Urinalysis w rflx microscopic Result Value Ref Range    Color, UA YELLOW/STRAW      Appearance CLOUDY      Specific Gravity, UA 1.026 (H) 1.001 - 1.023      pH, Urine 5.5 5.0 - 9.0      Protein, UA Negative NEG mg/dL    Glucose, UA Negative mg/dL    Ketones, Urine TRACE (A) NEG mg/dL    Bilirubin Urine Negative NEG      Blood, Urine Negative NEG      Urobilinogen, Urine 1.0 0.2 - 1.0 EU/dL    Nitrite, Urine Negative NEG      Leukocyte Esterase, Urine Negative NEG     POC Pregnancy Urine Qual   Result Value Ref Range    Preg Test, Ur Negative NEG          US PELVIS COMPLETE   Final Result   1. Retroverted uterus. 2. 1.3 cm complex right ovary cyst, decreased in size from the prior ultrasound. 3. New 3.3 cm left ovary cyst with minimal internal debris, favored to be a   follicle. Voice dictation software was used during the making of this note. This software is not perfect and grammatical and other typographical errors may be present. This note has not been completely proofread for errors.        Court DO Juani  11/25/22 9705

## 2022-11-25 NOTE — ED NOTES
I have reviewed discharge instructions with the patient. The patient verbalized understanding. Patient left ED via Discharge Method: ambulatory to Home with self. Opportunity for questions and clarification provided. Patient given 2 scripts. To continue your aftercare when you leave the hospital, you may receive an automated call from our care team to check in on how you are doing. This is a free service and part of our promise to provide the best care and service to meet your aftercare needs.  If you have questions, or wish to unsubscribe from this service please call [phone]. Thank you for Choosing our [facility] Emergency Department.         Jose Raul Oliveira RN  11/25/22 0403
English

## 2022-11-25 NOTE — DISCHARGE INSTRUCTIONS
Keep your appointment with your GYN and talk to them about possibly starting on birth control secondary to your multiple ovarian cyst.  Use heating pad over the lower abdomen and take naproxen up to 2 times a day as needed for moderate pain. Try taking the Tylenol with codeine as needed for severe pain. Risk of opioid analgesics include, but are not limited to: Overdose they can stop or slow your breathing and lead to death; fractures from falls; drowsiness leading to injury; tolerance, dependence and addiction. You should not operate any motorized vehicles or work from a height greater than ground level when taking opioid analgesics as they increase your fall risks.

## 2022-11-28 DIAGNOSIS — R74.01 TRANSAMINITIS: ICD-10-CM

## 2022-11-28 DIAGNOSIS — N83.202 LEFT OVARIAN CYST: ICD-10-CM

## 2022-11-28 LAB — ERYTHROCYTE [SEDIMENTATION RATE] IN BLOOD: 1 MM/HR (ref 0–20)

## 2022-11-29 LAB
ALBUMIN SERPL-MCNC: 3.8 G/DL (ref 3.5–5)
ALBUMIN/GLOB SERPL: 1.4 {RATIO} (ref 0.4–1.6)
ALP SERPL-CCNC: 92 U/L (ref 50–136)
ALT SERPL-CCNC: 60 U/L (ref 12–65)
ANION GAP SERPL CALC-SCNC: 8 MMOL/L (ref 2–11)
AST SERPL-CCNC: 39 U/L (ref 15–37)
BILIRUB SERPL-MCNC: 0.4 MG/DL (ref 0.2–1.1)
BUN SERPL-MCNC: 8 MG/DL (ref 6–23)
CALCIUM SERPL-MCNC: 8.9 MG/DL (ref 8.3–10.4)
CHLORIDE SERPL-SCNC: 110 MMOL/L (ref 101–110)
CHOLEST SERPL-MCNC: 124 MG/DL
CO2 SERPL-SCNC: 23 MMOL/L (ref 21–32)
CREAT SERPL-MCNC: 0.8 MG/DL (ref 0.6–1)
CRP SERPL-MCNC: <0.3 MG/DL (ref 0–0.9)
GLOBULIN SER CALC-MCNC: 2.8 G/DL (ref 2.8–4.5)
GLUCOSE SERPL-MCNC: 91 MG/DL (ref 65–100)
HBV SURFACE AB SERPL IA-ACNC: <3.1 MIU/ML
HBV SURFACE AG SER QL: NONREACTIVE
HCV AB SER QL: NONREACTIVE
HDLC SERPL-MCNC: 59 MG/DL (ref 40–60)
HDLC SERPL: 2.1 {RATIO}
HIV 1+2 AB+HIV1 P24 AG SERPL QL IA: NONREACTIVE
HIV 1/2 RESULT COMMENT: NORMAL
LDLC SERPL CALC-MCNC: 59 MG/DL
POTASSIUM SERPL-SCNC: 3.8 MMOL/L (ref 3.5–5.1)
PROT SERPL-MCNC: 6.6 G/DL (ref 6.3–8.2)
RHEUMATOID FACT SER QL LA: NEGATIVE
SODIUM SERPL-SCNC: 141 MMOL/L (ref 133–143)
TRIGL SERPL-MCNC: 30 MG/DL (ref 35–150)
VLDLC SERPL CALC-MCNC: 6 MG/DL (ref 6–23)

## 2022-11-30 LAB
ANA SER QL: NEGATIVE
CCP IGA+IGG SERPL IA-ACNC: 1 UNITS (ref 0–19)
HBV CORE AB SERPL QL IA: NEGATIVE

## 2022-12-02 ENCOUNTER — OFFICE VISIT (OUTPATIENT)
Dept: OBGYN CLINIC | Age: 22
End: 2022-12-02
Payer: COMMERCIAL

## 2022-12-02 VITALS
WEIGHT: 126 LBS | HEIGHT: 62 IN | SYSTOLIC BLOOD PRESSURE: 108 MMHG | BODY MASS INDEX: 23.19 KG/M2 | DIASTOLIC BLOOD PRESSURE: 60 MMHG

## 2022-12-02 DIAGNOSIS — N83.202 BILATERAL OVARIAN CYSTS: Primary | ICD-10-CM

## 2022-12-02 DIAGNOSIS — N83.201 BILATERAL OVARIAN CYSTS: Primary | ICD-10-CM

## 2022-12-02 DIAGNOSIS — N94.10 DYSPAREUNIA IN FEMALE: ICD-10-CM

## 2022-12-02 DIAGNOSIS — N94.6 DYSMENORRHEA: ICD-10-CM

## 2022-12-02 PROCEDURE — 99204 OFFICE O/P NEW MOD 45 MIN: CPT | Performed by: OBSTETRICS & GYNECOLOGY

## 2022-12-02 ASSESSMENT — ANXIETY QUESTIONNAIRES
2. NOT BEING ABLE TO STOP OR CONTROL WORRYING: 3
4. TROUBLE RELAXING: 3
GAD7 TOTAL SCORE: 21
3. WORRYING TOO MUCH ABOUT DIFFERENT THINGS: 3
7. FEELING AFRAID AS IF SOMETHING AWFUL MIGHT HAPPEN: 3
6. BECOMING EASILY ANNOYED OR IRRITABLE: 3
5. BEING SO RESTLESS THAT IT IS HARD TO SIT STILL: 3
1. FEELING NERVOUS, ANXIOUS, OR ON EDGE: 3
IF YOU CHECKED OFF ANY PROBLEMS ON THIS QUESTIONNAIRE, HOW DIFFICULT HAVE THESE PROBLEMS MADE IT FOR YOU TO DO YOUR WORK, TAKE CARE OF THINGS AT HOME, OR GET ALONG WITH OTHER PEOPLE: VERY DIFFICULT

## 2022-12-02 ASSESSMENT — PATIENT HEALTH QUESTIONNAIRE - PHQ9
SUM OF ALL RESPONSES TO PHQ QUESTIONS 1-9: 16
5. POOR APPETITE OR OVEREATING: 2
8. MOVING OR SPEAKING SO SLOWLY THAT OTHER PEOPLE COULD HAVE NOTICED. OR THE OPPOSITE, BEING SO FIGETY OR RESTLESS THAT YOU HAVE BEEN MOVING AROUND A LOT MORE THAN USUAL: 2
7. TROUBLE CONCENTRATING ON THINGS, SUCH AS READING THE NEWSPAPER OR WATCHING TELEVISION: 2
3. TROUBLE FALLING OR STAYING ASLEEP: 2
SUM OF ALL RESPONSES TO PHQ9 QUESTIONS 1 & 2: 4
6. FEELING BAD ABOUT YOURSELF - OR THAT YOU ARE A FAILURE OR HAVE LET YOURSELF OR YOUR FAMILY DOWN: 2
SUM OF ALL RESPONSES TO PHQ QUESTIONS 1-9: 16
10. IF YOU CHECKED OFF ANY PROBLEMS, HOW DIFFICULT HAVE THESE PROBLEMS MADE IT FOR YOU TO DO YOUR WORK, TAKE CARE OF THINGS AT HOME, OR GET ALONG WITH OTHER PEOPLE: 2
SUM OF ALL RESPONSES TO PHQ QUESTIONS 1-9: 16
4. FEELING TIRED OR HAVING LITTLE ENERGY: 2
9. THOUGHTS THAT YOU WOULD BE BETTER OFF DEAD, OR OF HURTING YOURSELF: 0
1. LITTLE INTEREST OR PLEASURE IN DOING THINGS: 2
2. FEELING DOWN, DEPRESSED OR HOPELESS: 2
SUM OF ALL RESPONSES TO PHQ QUESTIONS 1-9: 16

## 2022-12-02 NOTE — PROGRESS NOTES
[place] [id number] [facility], [address], [address]    Candida Maldonado MD, Keesha Nguyen STALIN-BC  Saundra Page MD, FACOG    Assessment/Plan     Patient Active Problem List    Diagnosis Date Noted    Dysmenorrhea 12/02/2022     Priority: Medium     Overview Note:     noted       Assessment & Plan Note:     See A&P under cysts  Encouraged pt to start taking ibuprofen about 1-2 days before menses begins to decrease bleeding and cramping by potentially 50%. Dyspareunia in female 12/02/2022     Priority: Medium     Overview Note:     noted       Assessment & Plan Note:     See A&P under cysts      Bilateral ovarian cysts 10/27/2022     Priority: Medium     Overview Note:     noted       Assessment & Plan Note:     All previous notes, labs and/or imaging performed by ED x 2 were reviewed and confirmed with pt today. I interpreted these results and D/W pt my opinion and recommendations accordingly. Discussed with patient physiologic in nature. Discussed recommendations for OCPs to help manage symptoms. Expressed her concern with migraines and OCPs so we will start with a very low-dose OCP and have PCP manage migraines accordingly      Mild intermittent asthma without complication 84/42/8521     Priority: Medium    Transaminitis 10/27/2022     Priority: Medium    Postoperative wound infection 02/16/2022    Wound infection complicating hardware ([address] 06/04/2021       Problem List Items Addressed This Visit          Endocrine    Bilateral ovarian cysts - Primary     All previous notes, labs and/or imaging performed by ED x 2 were reviewed and confirmed with pt today. I interpreted these results and D/W pt my opinion and recommendations accordingly. Discussed with patient physiologic in nature. Discussed recommendations for OCPs to help manage symptoms.   Expressed her concern with migraines and OCPs so we will start with a very low-dose OCP and have PCP manage migraines accordingly            Genitourinary    Dysmenorrhea     See A&P under cysts  Encouraged pt to start taking ibuprofen about 1-2 days before menses begins to decrease bleeding and cramping by potentially 50%. Other    Dyspareunia in female     See A&P under cysts             Subjective     LAST PAP:  never      LAST MAMMO: never      LMP:  Patient's last menstrual period was 2022 (exact date). BIRTH CONTROL:  none     TOBACCO USE:  No  Vapes delta-8    [employer] 25 y.o. Nakul Jarrett presents today for referral for ovarian cyst.  Patient was seen in ED twice over the last 2 months with cyst on bilateral ovaries. Patient reports intermittent pelvic pain around ovulation time. She also endorses dysmenorrhea and dyspareunia. Reports regular menses. Denies any vaginal D/C, F/C, assoc GI/ issues. Denies any neuro changes, visual changes, H/A, CP, SOB.          OB History    Para Term  AB Living   0 0 0 0 0 0   SAB IAB Ectopic Molar Multiple Live Births   0 0 0 0 0 0           Past Medical History:   Diagnosis Date    ADHD     Adverse effect of anesthesia 2021    chose not to have block pre-op 2021- took longer to wake up and woke up screaming in pain and PONV    Anxiety     Asthma     proair prn now- 22 avg once weekly    Depression     Eczema     dupixent    Esophagitis, eosinophilic     no current medications/infusions    Gastrointestinal disorder     esophageal disorder- previously on infusions    Hx MRSA infection 2021    L ankle    Marijuana smoker 2022    twice daily per pt    Migraine     PONV (postoperative nausea and vomiting) 2021    PONV, relieved with zofran 8 mg- per patient    Suspected sleep apnea     \"I'm supposed to be tested\"    Samaria-Parkinson-White syndrome             Past Surgical History:   Procedure Laterality Date    APPENDECTOMY  2016    CHOLECYSTECTOMY  2016    ORTHOPEDIC SURGERY  2021    LEFT ANKLE HARDWARE REMOVAL ORTHOPEDIC SURGERY Left 02/2022    binta in ankle    ORTHOPEDIC SURGERY Left 04/19/2021    ORIF of Bimalleolar ankle fracture    ORTHOPEDIC SURGERY  02/2022    binta in ankle removed    UPPER GASTROINTESTINAL ENDOSCOPY             Family History   Problem Relation Age of Onset    Hypertension Mother     Diabetes type 2  Mother     Hypertension Father            Social History     Socioeconomic History    Marital status: Single     Spouse name: Not on file    Number of children: Not on file    Years of education: Not on file    Highest education level: Not on file   Occupational History    Not on file   Tobacco Use    Smoking status: Never    Smokeless tobacco: Never   Vaping Use    Vaping Use: Never used   Substance and Sexual Activity    Alcohol use: No    Drug use: Yes     Types: Marijuana (Weed)     Comment: a few times a week    Sexual activity: Not Currently     Partners: Female     Birth control/protection: None   Other Topics Concern    Not on file   Social History Narrative    Patient states feeling safe at home, denies abuse.       Social Determinants of Health     Financial Resource Strain: Not on file   Food Insecurity: Not on file   Transportation Needs: Not on file   Physical Activity: Not on file   Stress: Not on file   Social Connections: Not on file   Intimate Partner Violence: Not on file   Housing Stability: Not on file           Allergies   Allergen Reactions    Latex Rash    Prednisone Rash and Shortness Of Breath             Review of Systems    Constitutional:  No fevers or chills    Neuro:  No headaches, seizure activity    HEENT: no visual changes, bleeding gums    CV: No chest pain or palpitations    Resp: No SOB or cough    Breast:  No masses, pain, D/C, bleeding    GI:  No nausea/vomiting/diarrhea/constipation    : No dysuria or hematuria    MS:  No back, joint pain    Gyn:  As per HPI    Psych:  No depression, anxiety      Objective     Vitals:    12/02/22 0947   BP: 108/60   Site: Left Upper Arm   Position: Sitting   Weight: 126 lb (57.2 kg)   Height: 5' 2\" (1.575 m)          Physical Exam    General:  well developed, well nourished, in no acute distress     Head:   normocephalic and atraumatic     Lungs:  clear bilaterally with normal respirations     Heart:   regular rate and rhythm, S1, S2 without murmurs     Abdomen:  bowel sounds positive; abdomen soft and non-tender without masses, organomegaly, or hernias noted     Pelvic Exam: deferred at pt request (currently menstruating), will do with annual at F/U    Msk:   no deformity or scoliosis noted with normal posture and gait     Skin:   intact without lesions or rashes     Psych:  alert and cooperative; normal mood and affect; normal attention span and concentration        Tila Moore MD   10:18 AM  12/02/22

## 2022-12-02 NOTE — ASSESSMENT & PLAN NOTE
See A&P under cysts  Encouraged pt to start taking ibuprofen about 1-2 days before menses begins to decrease bleeding and cramping by potentially 50%.

## 2022-12-02 NOTE — ASSESSMENT & PLAN NOTE
All previous notes, labs and/or imaging performed by ED x 2 were reviewed and confirmed with pt today. I interpreted these results and D/W pt my opinion and recommendations accordingly. Discussed with patient physiologic in nature. Discussed recommendations for OCPs to help manage symptoms.   Expressed her concern with migraines and OCPs so we will start with a very low-dose OCP and have PCP manage migraines accordingly

## 2022-12-02 NOTE — PATIENT INSTRUCTIONS
You can start your birth control pills on Sunday. You should try to take them around the same time each day. Remember the pills may cause irregular bleeding for the first couple of months when starting pills. You should start taking ibuprofen 400-600 mg 2-3 times a day with food about 1-2 days before your period even starts to help cut the bleeding and cramping by half. Please come back in 4 months for a recheck and your pap smear  Thanks for coming to see us today and letting us take care of you!

## 2022-12-02 NOTE — PROGRESS NOTES
New patient here for concerns of a cyst.   Feeling pain in pelvic area. RN discussed to patient to verbalize mental health concerns to physician after RN scored patient a 12 w/ PHQ-9. RN asked if patient had a support system, patient denied having support system. LAST PAP:  never     LAST MAMMO: never     LMP:  Patient's last menstrual period was 12/01/2022 (exact date).     BIRTH CONTROL:  none    TOBACCO USE:  No  Vapes delta-8    FAMILY HISTORY OF:   Breast Cancer:  Yes   Ovarian Cancer:  No   Uterine Cancer:  No   Colon Cancer:  No    Vitals:    12/02/22 0947   BP: 108/60   Site: Left Upper Arm   Position: Sitting   Weight: 126 lb (57.2 kg)   Height: 5' 2\" (1.575 m)        Isidro Loredo RN  12/02/22  10:02 AM

## 2022-12-08 ENCOUNTER — OFFICE VISIT (OUTPATIENT)
Dept: INTERNAL MEDICINE CLINIC | Facility: CLINIC | Age: 22
End: 2022-12-08
Payer: COMMERCIAL

## 2022-12-08 VITALS
OXYGEN SATURATION: 98 % | BODY MASS INDEX: 22.45 KG/M2 | HEART RATE: 85 BPM | DIASTOLIC BLOOD PRESSURE: 68 MMHG | WEIGHT: 122 LBS | SYSTOLIC BLOOD PRESSURE: 106 MMHG | HEIGHT: 62 IN

## 2022-12-08 DIAGNOSIS — G43.719 INTRACTABLE CHRONIC MIGRAINE WITHOUT AURA AND WITHOUT STATUS MIGRAINOSUS: ICD-10-CM

## 2022-12-08 DIAGNOSIS — R11.0 NAUSEA: ICD-10-CM

## 2022-12-08 DIAGNOSIS — F33.1 MODERATE EPISODE OF RECURRENT MAJOR DEPRESSIVE DISORDER (HCC): ICD-10-CM

## 2022-12-08 DIAGNOSIS — R10.84 GENERALIZED ABDOMINAL PAIN: Primary | ICD-10-CM

## 2022-12-08 DIAGNOSIS — F41.9 ANXIETY: ICD-10-CM

## 2022-12-08 PROCEDURE — 99214 OFFICE O/P EST MOD 30 MIN: CPT | Performed by: STUDENT IN AN ORGANIZED HEALTH CARE EDUCATION/TRAINING PROGRAM

## 2022-12-08 RX ORDER — MIRTAZAPINE 15 MG/1
15 TABLET, FILM COATED ORAL NIGHTLY
Qty: 90 TABLET | Refills: 1 | Status: SHIPPED | OUTPATIENT
Start: 2022-12-08

## 2022-12-08 RX ORDER — ONDANSETRON 4 MG/1
4 TABLET, FILM COATED ORAL 3 TIMES DAILY PRN
Qty: 15 TABLET | Refills: 0 | Status: SHIPPED | OUTPATIENT
Start: 2022-12-08

## 2022-12-08 RX ORDER — RIZATRIPTAN BENZOATE 10 MG/1
TABLET ORAL
Qty: 18 TABLET | Refills: 1 | Status: SHIPPED | OUTPATIENT
Start: 2022-12-08

## 2022-12-08 ASSESSMENT — PATIENT HEALTH QUESTIONNAIRE - PHQ9
2. FEELING DOWN, DEPRESSED OR HOPELESS: 2
SUM OF ALL RESPONSES TO PHQ QUESTIONS 1-9: 17
3. TROUBLE FALLING OR STAYING ASLEEP: 3
8. MOVING OR SPEAKING SO SLOWLY THAT OTHER PEOPLE COULD HAVE NOTICED. OR THE OPPOSITE, BEING SO FIGETY OR RESTLESS THAT YOU HAVE BEEN MOVING AROUND A LOT MORE THAN USUAL: 1
1. LITTLE INTEREST OR PLEASURE IN DOING THINGS: 3
SUM OF ALL RESPONSES TO PHQ QUESTIONS 1-9: 17
10. IF YOU CHECKED OFF ANY PROBLEMS, HOW DIFFICULT HAVE THESE PROBLEMS MADE IT FOR YOU TO DO YOUR WORK, TAKE CARE OF THINGS AT HOME, OR GET ALONG WITH OTHER PEOPLE: 2
SUM OF ALL RESPONSES TO PHQ9 QUESTIONS 1 & 2: 5
9. THOUGHTS THAT YOU WOULD BE BETTER OFF DEAD, OR OF HURTING YOURSELF: 0
SUM OF ALL RESPONSES TO PHQ QUESTIONS 1-9: 17
4. FEELING TIRED OR HAVING LITTLE ENERGY: 3
SUM OF ALL RESPONSES TO PHQ QUESTIONS 1-9: 17
6. FEELING BAD ABOUT YOURSELF - OR THAT YOU ARE A FAILURE OR HAVE LET YOURSELF OR YOUR FAMILY DOWN: 1
7. TROUBLE CONCENTRATING ON THINGS, SUCH AS READING THE NEWSPAPER OR WATCHING TELEVISION: 1
5. POOR APPETITE OR OVEREATING: 3

## 2022-12-08 NOTE — PROGRESS NOTES
FOLLOW UP VISIT    Subjective:    Felicia Worley (: 2000) is a 25 y.o., female,   Chief Complaint   Patient presents with    Results     Pt here for 6 week f/u to review labs    Migraine     States she has had more frequent migraines       HPI:    Lives with mom, feels safe at home, works as cook. She endorses abdominal pain, right upper quadrant and down to the right lower quadrant. She has had cholecystectomy and appendectomy in the past. On ED visit on 10/19/22, CT abdomen pelvis contrast noted to have ovarian cyst and otherwise no abnormalities. Not able to eat, some pain after eating food, smell makes her nauseated. Currently not seeing adult gastroenterologist. Nausea worse in the morning. No diarrhea, constipation. Labs reviewed with patient. Hepatitis B&C negative, autoimmune labs normal, liver enzymes improved from prior. LDL 59. Migraine headache: every other day however she believes this is from lack of sleep. Trouble going to and staying asleep. Depression, anxiety: tried lexapro. Has been to counseling however feels like it didn't help much. Just got out of relationship. Anxiety worse. No suicidal homicidal ideation    Joint stiffness mostly at night, ankle. Worse at night. No joint swelling. On birth control for ovarian cysts per gynecology. Other medical history:     The following portions of the patient's history were reviewed and updated as appropriate:      Patient Active Problem List   Diagnosis    Wound infection complicating hardware ([address]    Postoperative wound infection    Bilateral ovarian cysts    Mild intermittent asthma without complication    Transaminitis    Dysmenorrhea    Dyspareunia in female     Past Surgical History:   Procedure Laterality Date    APPENDECTOMY  2016    CHOLECYSTECTOMY  2016    ORTHOPEDIC SURGERY  2021    LEFT ANKLE HARDWARE REMOVAL     ORTHOPEDIC SURGERY Left 2022    binta in ankle    ORTHOPEDIC SURGERY Left 2021    ORIF of Bimalleolar ankle fracture    ORTHOPEDIC SURGERY  02/2022    binta in ankle removed    UPPER GASTROINTESTINAL ENDOSCOPY       Family History   Problem Relation Age of Onset    Hypertension Mother     Diabetes type 2  Mother     Hypertension Father      Social History     Socioeconomic History    Marital status: Single     Spouse name: Not on file    Number of children: Not on file    Years of education: Not on file    Highest education level: Not on file   Occupational History    Not on file   Tobacco Use    Smoking status: Never    Smokeless tobacco: Never   Vaping Use    Vaping Use: Never used   Substance and Sexual Activity    Alcohol use: No    Drug use: Yes     Types: Marijuana (Weed)     Comment: a few times a week    Sexual activity: Not Currently     Partners: Female     Birth control/protection: None   Other Topics Concern    Not on file   Social History Narrative    Patient states feeling safe at home, denies abuse. Social Determinants of Health     Financial Resource Strain: Not on file   Food Insecurity: Not on file   Transportation Needs: Not on file   Physical Activity: Not on file   Stress: Not on file   Social Connections: Not on file   Intimate Partner Violence: Not on file   Housing Stability: Not on file     Current Outpatient Medications   Medication Sig Dispense Refill    rizatriptan (MAXALT) 10 MG tablet Take 10 mg as needed for headache. if symptoms persist or return, may repeat dose after 2 hours. No more than two pills in 24 hour period.  18 tablet 1    ondansetron (ZOFRAN) 4 MG tablet Take 1 tablet by mouth 3 times daily as needed for Nausea or Vomiting 15 tablet 0    mirtazapine (REMERON) 15 MG tablet Take 1 tablet by mouth nightly 90 tablet 1    norethindrone-ethinyl estradiol-Fe (LO LOESTRIN FE) 1 MG-10 MCG / 10 MCG tablet Take 1 tablet by mouth daily 28 tablet 12    naproxen (NAPROSYN) 500 MG tablet Take 1 tablet by mouth 2 times daily (with meals) for 20 days 40 tablet 0    albuterol sulfate HFA (PROVENTIL;VENTOLIN;PROAIR) 108 (90 Base) MCG/ACT inhaler Inhale 2 puffs into the lungs every 4 hours as needed for Wheezing or Shortness of Breath 18 g 2    triamcinolone (KENALOG) 0.1 % ointment daily as needed      dupilumab (DUPIXENT) 300 MG/2ML SOPN injection Once every 2 weeks. On Thursday       No current facility-administered medications for this visit.      Allergies as of 12/08/2022 - Fully Reviewed 12/08/2022   Allergen Reaction Noted    Latex Rash 04/15/2021    Prednisone Rash and Shortness Of Breath 02/01/2022       Review of Systems    Objective:    Vitals:    12/08/22 1502   BP: 106/68   Pulse: 85   SpO2: 98%   Weight: 122 lb (55.3 kg)   Height: 5' 2\" (1.575 m)        Physical Exam    Lab Results   Component Value Date    WBC 9.4 10/19/2022    HGB 13.9 10/19/2022    HCT 42.3 10/19/2022    MCV 89.8 10/19/2022     10/19/2022      Lab Results   Component Value Date/Time     11/28/2022 11:49 AM    K 3.8 11/28/2022 11:49 AM     11/28/2022 11:49 AM    CO2 23 11/28/2022 11:49 AM    BUN 8 11/28/2022 11:49 AM    CREATININE 0.80 11/28/2022 11:49 AM    GLUCOSE 91 11/28/2022 11:49 AM    CALCIUM 8.9 11/28/2022 11:49 AM       Lab Results   Component Value Date     11/28/2022    K 3.8 11/28/2022     11/28/2022    CO2 23 11/28/2022    BUN 8 11/28/2022    CREATININE 0.80 11/28/2022    GLUCOSE 91 11/28/2022    CALCIUM 8.9 11/28/2022    PROT 6.6 11/28/2022    LABALBU 3.8 11/28/2022    BILITOT 0.4 11/28/2022    ALKPHOS 92 11/28/2022    AST 39 (H) 11/28/2022    ALT 60 11/28/2022    LABGLOM >60 11/28/2022    GFRAA >60 02/18/2022    AGRATIO 1.1 (L) 07/12/2021    GLOB 2.8 11/28/2022     No results found for: TSHFT4, TSH, TSHREFLEX, XJM6QJO   Lab Results   Component Value Date    CHOL 124 11/28/2022     Lab Results   Component Value Date    TRIG 30 (L) 11/28/2022     Lab Results   Component Value Date    HDL 59 11/28/2022     Lab Results   Component Value Date    LDLCALC 59 11/28/2022 Lab Results   Component Value Date    LABVLDL 6 11/28/2022     Lab Results   Component Value Date    CHOLHDLRATIO 2.1 11/28/2022      No results found for: LABA1C     Assessent & Plan    1. Generalized abdominal pain  -     H. Pylori Breath Test; Future  2. Moderate episode of recurrent major depressive disorder (HCC)  -     mirtazapine (REMERON) 15 MG tablet; Take 1 tablet by mouth nightly, Disp-90 tablet, R-1Normal  3. Intractable chronic migraine without aura and without status migrainosus  -     rizatriptan (MAXALT) 10 MG tablet; Take 10 mg as needed for headache. if symptoms persist or return, may repeat dose after 2 hours. No more than two pills in 24 hour period. , Disp-18 tablet, R-1Normal  4. Anxiety  -     mirtazapine (REMERON) 15 MG tablet; Take 1 tablet by mouth nightly, Disp-90 tablet, R-1Normal  5. Nausea  -     ondansetron (ZOFRAN) 4 MG tablet; Take 1 tablet by mouth 3 times daily as needed for Nausea or Vomiting, Disp-15 tablet, R-0Normal     Unclear etiology of abdominal pain. Could be related to anxiety, depression. Recent CT of the abdomen with contrast was unremarkable other than ovarian cyst.  We will try mirtazapine nightly which will hopefully help with depression, weight gain and sleep. Test for H. pylori and will refer to GI  I have low suspicion for rheumatologic disease at this time. May consider testing for porphyria in the future. Zofran PRN for nausea    The patient and/or patient representative voiced understanding and agreement with the current diagnoses, recommendations, and possible side effects.     Return in about 4 weeks (around 1/5/2023) for may be virtual.     Nya Khan MD

## 2022-12-14 ENCOUNTER — OFFICE VISIT (OUTPATIENT)
Dept: INTERNAL MEDICINE CLINIC | Facility: CLINIC | Age: 22
End: 2022-12-14
Payer: COMMERCIAL

## 2022-12-14 VITALS
BODY MASS INDEX: 23.83 KG/M2 | HEIGHT: 62 IN | DIASTOLIC BLOOD PRESSURE: 68 MMHG | SYSTOLIC BLOOD PRESSURE: 110 MMHG | WEIGHT: 129.5 LBS

## 2022-12-14 DIAGNOSIS — R10.84 GENERALIZED ABDOMINAL PAIN: ICD-10-CM

## 2022-12-14 DIAGNOSIS — R31.9 HEMATURIA, UNSPECIFIED TYPE: Primary | ICD-10-CM

## 2022-12-14 DIAGNOSIS — R31.9 HEMATURIA, UNSPECIFIED TYPE: ICD-10-CM

## 2022-12-14 LAB
BILIRUBIN, URINE, POC: NEGATIVE
BLOOD URINE, POC: ABNORMAL
GLUCOSE URINE, POC: NEGATIVE
KETONES, URINE, POC: NEGATIVE
LEUKOCYTE ESTERASE, URINE, POC: NEGATIVE
NITRITE, URINE, POC: NEGATIVE
PH, URINE, POC: 5 (ref 4.6–8)
PROTEIN,URINE, POC: NEGATIVE
SPECIFIC GRAVITY, URINE, POC: 1 (ref 1–1.03)
URINALYSIS CLARITY, POC: CLEAR
URINALYSIS COLOR, POC: ABNORMAL
UROBILINOGEN, POC: ABNORMAL

## 2022-12-14 PROCEDURE — 99213 OFFICE O/P EST LOW 20 MIN: CPT | Performed by: STUDENT IN AN ORGANIZED HEALTH CARE EDUCATION/TRAINING PROGRAM

## 2022-12-14 PROCEDURE — 81002 URINALYSIS NONAUTO W/O SCOPE: CPT | Performed by: STUDENT IN AN ORGANIZED HEALTH CARE EDUCATION/TRAINING PROGRAM

## 2022-12-14 NOTE — PROGRESS NOTES
FOLLOW UP VISIT    Subjective:    Nadja Maldonado (: 2000) is a 25 y.o., female,   Chief Complaint   Patient presents with    Hematuria     Pt noted blood in her urine past week after voiding (last menses was 22 ended 22 Pt c/o abd pain past few weeks        HPI:    Blood in urine for about a week. She started mirtazapine around the same time. Urine dipstick shows large blood, however does not appear bloody in appearance. Patient reports that urine sometimes has a hint of red. Has never happened before. Pain in arms and legs, weighed down. No exertion, excessive exercise. Suprapubic pressure, pain for about a week. Chills for about a week. When she wipes she sees some blood on the tissue. Reports it is not coming from vagina. No painful urination. Pain in stomach after urinating.     The following portions of the patient's history were reviewed and updated as appropriate:      Patient Active Problem List   Diagnosis    Wound infection complicating hardware ([address]    Postoperative wound infection    Bilateral ovarian cysts    Mild intermittent asthma without complication    Transaminitis    Dysmenorrhea    Dyspareunia in female     Past Surgical History:   Procedure Laterality Date    APPENDECTOMY  2016    CHOLECYSTECTOMY  2016    ORTHOPEDIC SURGERY  2021    LEFT ANKLE HARDWARE REMOVAL     ORTHOPEDIC SURGERY Left 2022    binta in ankle    ORTHOPEDIC SURGERY Left 2021    ORIF of Bimalleolar ankle fracture    ORTHOPEDIC SURGERY  2022    binta in ankle removed    UPPER GASTROINTESTINAL ENDOSCOPY       Family History   Problem Relation Age of Onset    Hypertension Mother     Diabetes type 2  Mother     Hypertension Father      Social History     Socioeconomic History    Marital status: Single     Spouse name: Not on file    Number of children: Not on file    Years of education: Not on file    Highest education level: Not on file   Occupational History    Not on file   Tobacco Use Smoking status: Never    Smokeless tobacco: Never   Vaping Use    Vaping Use: Never used   Substance and Sexual Activity    Alcohol use: No    Drug use: Yes     Types: Marijuana (Weed)     Comment: a few times a week    Sexual activity: Not Currently     Partners: Female     Birth control/protection: None   Other Topics Concern    Not on file   Social History Narrative    Patient states feeling safe at home, denies abuse. Social Determinants of Health     Financial Resource Strain: Not on file   Food Insecurity: Not on file   Transportation Needs: Not on file   Physical Activity: Not on file   Stress: Not on file   Social Connections: Not on file   Intimate Partner Violence: Not on file   Housing Stability: Not on file     Current Outpatient Medications   Medication Sig Dispense Refill    rizatriptan (MAXALT) 10 MG tablet Take 10 mg as needed for headache. if symptoms persist or return, may repeat dose after 2 hours. No more than two pills in 24 hour period. 18 tablet 1    ondansetron (ZOFRAN) 4 MG tablet Take 1 tablet by mouth 3 times daily as needed for Nausea or Vomiting 15 tablet 0    mirtazapine (REMERON) 15 MG tablet Take 1 tablet by mouth nightly 90 tablet 1    norethindrone-ethinyl estradiol-Fe (LO LOESTRIN FE) 1 MG-10 MCG / 10 MCG tablet Take 1 tablet by mouth daily 28 tablet 12    naproxen (NAPROSYN) 500 MG tablet Take 1 tablet by mouth 2 times daily (with meals) for 20 days 40 tablet 0    albuterol sulfate HFA (PROVENTIL;VENTOLIN;PROAIR) 108 (90 Base) MCG/ACT inhaler Inhale 2 puffs into the lungs every 4 hours as needed for Wheezing or Shortness of Breath 18 g 2    triamcinolone (KENALOG) 0.1 % ointment Apply topically daily as needed      dupilumab (DUPIXENT) 300 MG/2ML SOPN injection Once every 2 weeks. On Thursday (Patient not taking: Reported on 12/14/2022)       No current facility-administered medications for this visit.      Allergies as of 12/14/2022 - Fully Reviewed 12/14/2022   Allergen Reaction Noted    Latex Rash 04/15/2021    Prednisone Rash and Shortness Of Breath 02/01/2022       Review of Systems   Constitutional:  Negative for chills and fever. HENT:  Negative for congestion. Eyes:  Negative for visual disturbance. Respiratory:  Negative for shortness of breath. Cardiovascular:  Negative for chest pain. Gastrointestinal:  Negative for abdominal pain, nausea and vomiting. Musculoskeletal:  Negative for arthralgias. Neurological:  Negative for syncope and headaches. Objective:    Vitals:    12/14/22 1440   BP: 110/68   Weight: 129 lb 8 oz (58.7 kg)   Height: 5' 2\" (1.575 m)        Physical Exam  Constitutional:       General: She is not in acute distress. Appearance: Normal appearance. HENT:      Head: Normocephalic and atraumatic. Eyes:      Extraocular Movements: Extraocular movements intact. Cardiovascular:      Rate and Rhythm: Normal rate and regular rhythm. Heart sounds: Normal heart sounds. Pulmonary:      Breath sounds: Normal breath sounds. Abdominal:      General: There is no distension. Palpations: Abdomen is soft. Tenderness: There is abdominal tenderness (suprapubic tenderness, mild). There is no guarding. Musculoskeletal:         General: No deformity. Skin:     General: Skin is warm and dry. Neurological:      Mental Status: She is alert. Mental status is at baseline.    Psychiatric:         Mood and Affect: Mood normal.       Lab Results   Component Value Date    WBC 9.4 10/19/2022    HGB 13.9 10/19/2022    HCT 42.3 10/19/2022    MCV 89.8 10/19/2022     10/19/2022      Lab Results   Component Value Date/Time     12/14/2022 03:31 PM    K 3.7 12/14/2022 03:31 PM     12/14/2022 03:31 PM    CO2 24 12/14/2022 03:31 PM    BUN 5 12/14/2022 03:31 PM    CREATININE 0.80 12/14/2022 03:31 PM    GLUCOSE 79 12/14/2022 03:31 PM    CALCIUM 8.3 12/14/2022 03:31 PM       Lab Results   Component Value Date     12/14/2022    K 3.7 12/14/2022     (H) 12/14/2022    CO2 24 12/14/2022    BUN 5 (L) 12/14/2022    CREATININE 0.80 12/14/2022    GLUCOSE 79 12/14/2022    CALCIUM 8.3 12/14/2022    PROT 6.6 11/28/2022    LABALBU 3.8 11/28/2022    BILITOT 0.4 11/28/2022    ALKPHOS 92 11/28/2022    AST 39 (H) 11/28/2022    ALT 60 11/28/2022    LABGLOM >60 12/14/2022    GFRAA >60 02/18/2022    AGRATIO 1.1 (L) 07/12/2021    GLOB 2.8 11/28/2022     No results found for: TSHFT4, TSH, TSHREFLEX, [id number]   Lab Results   Component Value Date    CHOL 124 11/28/2022     Lab Results   Component Value Date    TRIG 30 (L) 11/28/2022     Lab Results   Component Value Date    HDL 59 11/28/2022     Lab Results   Component Value Date    LDLCALC 59 11/28/2022     Lab Results   Component Value Date    LABVLDL 6 11/28/2022     Lab Results   Component Value Date    CHOLHDLRATIO 2.1 11/28/2022      No results found for: Belle    1. Hematuria, unspecified type  -     AMB POC URINALYSIS DIP STICK MANUAL W/O MICRO  -     Porphyrins Urine Quant; Future  -     CK; Future  -     Basic Metabolic Panel; Future  -     Urinalysis; Future  -     Culture, Urine; Future  2. Generalized abdominal pain  -     Porphyrins Urine Quant; Future  -     Basic Metabolic Panel; Future  -     Urinalysis; Future     Unclear etiology, could be from mirtazepine, will stop for now. Dipstick shows large blood only. Get urinalysis, culture. She will f/u in about 3 weeks. F/u sooner if symptoms worsen    The patient and/or patient representative voiced understanding and agreement with the current diagnoses, recommendations, and possible side effects. No follow-ups on file.      Joan Valle MD

## 2022-12-15 LAB
ANION GAP SERPL CALC-SCNC: 7 MMOL/L (ref 2–11)
BUN SERPL-MCNC: 5 MG/DL (ref 6–23)
CALCIUM SERPL-MCNC: 8.3 MG/DL (ref 8.3–10.4)
CHLORIDE SERPL-SCNC: 111 MMOL/L (ref 101–110)
CK SERPL-CCNC: 45 U/L (ref 21–215)
CO2 SERPL-SCNC: 24 MMOL/L (ref 21–32)
CREAT SERPL-MCNC: 0.8 MG/DL (ref 0.6–1)
GLUCOSE SERPL-MCNC: 79 MG/DL (ref 65–100)
POTASSIUM SERPL-SCNC: 3.7 MMOL/L (ref 3.5–5.1)
SODIUM SERPL-SCNC: 142 MMOL/L (ref 133–143)

## 2022-12-15 ASSESSMENT — ENCOUNTER SYMPTOMS
ABDOMINAL PAIN: 0
NAUSEA: 0
SHORTNESS OF BREATH: 0
VOMITING: 0

## 2022-12-16 LAB — UREA BREATH TEST QL: NEGATIVE

## 2022-12-17 LAB
BACTERIA SPEC CULT: NORMAL
SERVICE CMNT-IMP: NORMAL

## 2023-01-27 ENCOUNTER — OFFICE VISIT (OUTPATIENT)
Dept: INTERNAL MEDICINE CLINIC | Facility: CLINIC | Age: 23
End: 2023-01-27
Payer: COMMERCIAL

## 2023-01-27 VITALS
HEART RATE: 105 BPM | DIASTOLIC BLOOD PRESSURE: 68 MMHG | BODY MASS INDEX: 22.64 KG/M2 | OXYGEN SATURATION: 98 % | WEIGHT: 123.8 LBS | SYSTOLIC BLOOD PRESSURE: 98 MMHG

## 2023-01-27 DIAGNOSIS — L30.9 ECZEMA OF SCALP: Primary | ICD-10-CM

## 2023-01-27 DIAGNOSIS — R21 RASH AND NONSPECIFIC SKIN ERUPTION: ICD-10-CM

## 2023-01-27 DIAGNOSIS — J45.20 MILD INTERMITTENT ASTHMA WITHOUT COMPLICATION: ICD-10-CM

## 2023-01-27 PROCEDURE — 99214 OFFICE O/P EST MOD 30 MIN: CPT | Performed by: NURSE PRACTITIONER

## 2023-01-27 RX ORDER — MUPIROCIN CALCIUM 20 MG/G
CREAM TOPICAL
Qty: 30 G | Refills: 1 | Status: SHIPPED | OUTPATIENT
Start: 2023-01-27 | End: 2023-02-26

## 2023-01-27 RX ORDER — MONTELUKAST SODIUM 10 MG/1
10 TABLET ORAL NIGHTLY
Qty: 30 TABLET | Refills: 5 | Status: SHIPPED | OUTPATIENT
Start: 2023-01-27

## 2023-01-27 RX ORDER — HYDROXYZINE HYDROCHLORIDE 10 MG/1
10 TABLET, FILM COATED ORAL EVERY 8 HOURS PRN
Qty: 20 TABLET | Refills: 0 | Status: SHIPPED | OUTPATIENT
Start: 2023-01-27 | End: 2023-02-06

## 2023-01-27 RX ORDER — KETOCONAZOLE 20 MG/ML
SHAMPOO TOPICAL
Qty: 120 ML | Refills: 5 | Status: SHIPPED | OUTPATIENT
Start: 2023-01-27

## 2023-01-27 ASSESSMENT — PATIENT HEALTH QUESTIONNAIRE - PHQ9
4. FEELING TIRED OR HAVING LITTLE ENERGY: 3
5. POOR APPETITE OR OVEREATING: 2
1. LITTLE INTEREST OR PLEASURE IN DOING THINGS: 0
SUM OF ALL RESPONSES TO PHQ QUESTIONS 1-9: 8
8. MOVING OR SPEAKING SO SLOWLY THAT OTHER PEOPLE COULD HAVE NOTICED. OR THE OPPOSITE, BEING SO FIGETY OR RESTLESS THAT YOU HAVE BEEN MOVING AROUND A LOT MORE THAN USUAL: 0
3. TROUBLE FALLING OR STAYING ASLEEP: 3
SUM OF ALL RESPONSES TO PHQ9 QUESTIONS 1 & 2: 0
SUM OF ALL RESPONSES TO PHQ QUESTIONS 1-9: 8
SUM OF ALL RESPONSES TO PHQ QUESTIONS 1-9: 8
6. FEELING BAD ABOUT YOURSELF - OR THAT YOU ARE A FAILURE OR HAVE LET YOURSELF OR YOUR FAMILY DOWN: 0
7. TROUBLE CONCENTRATING ON THINGS, SUCH AS READING THE NEWSPAPER OR WATCHING TELEVISION: 0
SUM OF ALL RESPONSES TO PHQ QUESTIONS 1-9: 8
2. FEELING DOWN, DEPRESSED OR HOPELESS: 0
10. IF YOU CHECKED OFF ANY PROBLEMS, HOW DIFFICULT HAVE THESE PROBLEMS MADE IT FOR YOU TO DO YOUR WORK, TAKE CARE OF THINGS AT HOME, OR GET ALONG WITH OTHER PEOPLE: 1
9. THOUGHTS THAT YOU WOULD BE BETTER OFF DEAD, OR OF HURTING YOURSELF: 0

## 2023-01-27 NOTE — PROGRESS NOTES
PROGRESS NOTE    SUBJECTIVE:   Alfa Desir is a 25 y.o. female seen for a follow up visit for   Chief Complaint   Patient presents with    Other     Bumps on back of neck since yesterday     Other  Associated symptoms include coughing and a rash (posterior neck). Pertinent negatives include no fatigue or fever. Knots, itch worse today. Trouble breathing using inhaler more frequently. Wheezing. Occurs more at night. Reviewed and updated this visit by provider:  Tobacco  Allergies  Meds  Problems  Med Hx  Surg Hx  Fam Hx         Review of Systems   Constitutional:  Negative for fatigue and fever. Respiratory:  Positive for cough and wheezing (using inhaler more frequently than before. ). Skin:  Positive for rash (posterior neck). OBJECTIVE:    BP 98/68 (Site: Left Upper Arm, Position: Sitting, Cuff Size: Small Adult)   Pulse (!) 105   Wt 123 lb 12.8 oz (56.2 kg)   SpO2 98%   BMI 22.64 kg/m²      Physical Exam  Vitals and nursing note reviewed. Constitutional:       Appearance: Normal appearance. HENT:      Head: Normocephalic. Cardiovascular:      Rate and Rhythm: Normal rate. Pulmonary:      Effort: Pulmonary effort is normal.   Skin:     General: Skin is warm and dry. Findings: Rash (posterior neck roughness, with 4-5 mm round raised bumps. (Pruritic per patient description). ) present. Neurological:      General: No focal deficit present. Mental Status: She is alert and oriented to person, place, and time. Motor: No weakness. Gait: Gait normal.   Psychiatric:         Mood and Affect: Mood normal.         Behavior: Behavior normal.        ASSESSMENT and PLAN    1. Eczema of scalp  -     ketoconazole (NIZORAL) 2 % shampoo; Apply topically daily as needed. , Disp-120 mL, R-5, Normal  -     hydrOXYzine HCl (ATARAX) 10 MG tablet; Take 1 tablet by mouth every 8 hours as needed for Itching Do not drive while taking this medication. , Disp-20 tablet, R-0Normal  2. Rash and nonspecific skin eruption  -     mupirocin (BACTROBAN) 2 % cream; Apply 2 times daily for 10 days. , Disp-30 g, R-1, Normal  3. Mild intermittent asthma without complication  -     montelukast (SINGULAIR) 10 MG tablet; Take 1 tablet by mouth nightly, Disp-30 tablet, R-5Normal    Eczema with small lesions on posterior neck. She also has some small rounds raised lesion on back. Recommend use mupirocin on those lesions. Also use ketoconazole shampoo for scalp and posterior neck eczematous lesions. Add Singulair for asthma symptoms. Return in about 4 weeks (around 2/24/2023) for Follow-Up asthma eczema . the following changes in treatment are made: as above. reviewed medications and side effects in detail    On this date 1/27/2023 I have spent 34 minutes reviewing previous notes, test results and face to face with the patient. Over 50% of today's office visit was spent in face to face time in counseling reviewing test results, importance of compliance, education about disease process, benefits and side-effects of medications and follow-up plan. ELVIS Rosario NP    Dictated using voice recognition software.  Proofread, but unrecognized voice recognition errors may exist.

## 2023-02-09 ENCOUNTER — APPOINTMENT (OUTPATIENT)
Dept: CT IMAGING | Age: 23
End: 2023-02-09

## 2023-02-09 ENCOUNTER — HOSPITAL ENCOUNTER (EMERGENCY)
Age: 23
Discharge: HOME | End: 2023-02-09
Attending: STUDENT IN AN ORGANIZED HEALTH CARE EDUCATION/TRAINING PROGRAM

## 2023-02-09 VITALS
OXYGEN SATURATION: 100 % | WEIGHT: 120 LBS | SYSTOLIC BLOOD PRESSURE: 105 MMHG | TEMPERATURE: 98.3 F | HEART RATE: 87 BPM | BODY MASS INDEX: 22.08 KG/M2 | HEIGHT: 62 IN | DIASTOLIC BLOOD PRESSURE: 76 MMHG | RESPIRATION RATE: 16 BRPM

## 2023-02-09 DIAGNOSIS — L02.91 ABSCESS: Primary | ICD-10-CM

## 2023-02-09 LAB
ALBUMIN SERPL-MCNC: 3.6 G/DL (ref 3.5–5)
ALBUMIN/GLOB SERPL: 1 (ref 0.4–1.6)
ALP SERPL-CCNC: 94 U/L (ref 50–130)
ALT SERPL-CCNC: 19 U/L (ref 12–65)
ANION GAP SERPL CALC-SCNC: 4 MMOL/L (ref 2–11)
AST SERPL-CCNC: 11 U/L (ref 15–37)
BASOPHILS # BLD: 0 K/UL (ref 0–0.2)
BASOPHILS NFR BLD: 0 % (ref 0–2)
BILIRUB SERPL-MCNC: 0.4 MG/DL (ref 0.2–1.1)
BUN SERPL-MCNC: 9 MG/DL (ref 6–23)
CALCIUM SERPL-MCNC: 8.8 MG/DL (ref 8.3–10.4)
CHLORIDE SERPL-SCNC: 108 MMOL/L (ref 101–110)
CO2 SERPL-SCNC: 26 MMOL/L (ref 21–32)
CREAT SERPL-MCNC: 0.81 MG/DL (ref 0.6–1)
DIFFERENTIAL METHOD BLD: ABNORMAL
EOSINOPHIL # BLD: 0.2 K/UL (ref 0–0.8)
EOSINOPHIL NFR BLD: 1 % (ref 0.5–7.8)
ERYTHROCYTE [DISTWIDTH] IN BLOOD BY AUTOMATED COUNT: 12.6 % (ref 11.9–14.6)
GLOBULIN SER CALC-MCNC: 3.5 G/DL (ref 2.8–4.5)
GLUCOSE SERPL-MCNC: 96 MG/DL (ref 65–100)
HCG SERPL QL: NEGATIVE
HCT VFR BLD AUTO: 42.4 % (ref 35.8–46.3)
HGB BLD-MCNC: 14 G/DL (ref 11.7–15.4)
IMM GRANULOCYTES # BLD AUTO: 0.1 K/UL (ref 0–0.5)
IMM GRANULOCYTES NFR BLD AUTO: 0 % (ref 0–5)
LACTATE SERPL-SCNC: 1 MMOL/L (ref 0.4–2)
LYMPHOCYTES # BLD: 1.7 K/UL (ref 0.5–4.6)
LYMPHOCYTES NFR BLD: 11 % (ref 13–44)
MCH RBC QN AUTO: 29.6 PG (ref 26.1–32.9)
MCHC RBC AUTO-ENTMCNC: 33 G/DL (ref 31.4–35)
MCV RBC AUTO: 89.6 FL (ref 82–102)
MONOCYTES # BLD: 0.8 K/UL (ref 0.1–1.3)
MONOCYTES NFR BLD: 5 % (ref 4–12)
NEUTS SEG # BLD: 12.3 K/UL (ref 1.7–8.2)
NEUTS SEG NFR BLD: 82 % (ref 43–78)
NRBC # BLD: 0 K/UL (ref 0–0.2)
PLATELET # BLD AUTO: 231 K/UL (ref 150–450)
PMV BLD AUTO: 10.5 FL (ref 9.4–12.3)
POTASSIUM SERPL-SCNC: 4 MMOL/L (ref 3.5–5.1)
PROT SERPL-MCNC: 7.1 G/DL (ref 6.3–8.2)
RBC # BLD AUTO: 4.73 M/UL (ref 4.05–5.2)
SODIUM SERPL-SCNC: 138 MMOL/L (ref 133–143)
WBC # BLD AUTO: 15 K/UL (ref 4.3–11.1)

## 2023-02-09 PROCEDURE — 84703 CHORIONIC GONADOTROPIN ASSAY: CPT

## 2023-02-09 PROCEDURE — 96374 THER/PROPH/DIAG INJ IV PUSH: CPT

## 2023-02-09 PROCEDURE — 70491 CT SOFT TISSUE NECK W/DYE: CPT

## 2023-02-09 PROCEDURE — 96375 TX/PRO/DX INJ NEW DRUG ADDON: CPT

## 2023-02-09 PROCEDURE — 6360000004 HC RX CONTRAST MEDICATION: Performed by: STUDENT IN AN ORGANIZED HEALTH CARE EDUCATION/TRAINING PROGRAM

## 2023-02-09 PROCEDURE — 80053 COMPREHEN METABOLIC PANEL: CPT

## 2023-02-09 PROCEDURE — 99285 EMERGENCY DEPT VISIT HI MDM: CPT

## 2023-02-09 PROCEDURE — 2500000003 HC RX 250 WO HCPCS: Performed by: STUDENT IN AN ORGANIZED HEALTH CARE EDUCATION/TRAINING PROGRAM

## 2023-02-09 PROCEDURE — 10061 I&D ABSCESS COMP/MULTIPLE: CPT

## 2023-02-09 PROCEDURE — 6360000002 HC RX W HCPCS: Performed by: STUDENT IN AN ORGANIZED HEALTH CARE EDUCATION/TRAINING PROGRAM

## 2023-02-09 PROCEDURE — 6370000000 HC RX 637 (ALT 250 FOR IP): Performed by: STUDENT IN AN ORGANIZED HEALTH CARE EDUCATION/TRAINING PROGRAM

## 2023-02-09 PROCEDURE — 87040 BLOOD CULTURE FOR BACTERIA: CPT

## 2023-02-09 PROCEDURE — 2580000003 HC RX 258: Performed by: STUDENT IN AN ORGANIZED HEALTH CARE EDUCATION/TRAINING PROGRAM

## 2023-02-09 PROCEDURE — 85025 COMPLETE CBC W/AUTO DIFF WBC: CPT

## 2023-02-09 PROCEDURE — 83605 ASSAY OF LACTIC ACID: CPT

## 2023-02-09 RX ORDER — SULFAMETHOXAZOLE AND TRIMETHOPRIM 800; 160 MG/1; MG/1
1 TABLET ORAL 2 TIMES DAILY
Qty: 14 TABLET | Refills: 0 | Status: SHIPPED | OUTPATIENT
Start: 2023-02-09 | End: 2023-02-16

## 2023-02-09 RX ORDER — MORPHINE SULFATE 2 MG/ML
2 INJECTION, SOLUTION INTRAMUSCULAR; INTRAVENOUS ONCE
Status: COMPLETED | OUTPATIENT
Start: 2023-02-09 | End: 2023-02-09

## 2023-02-09 RX ORDER — LIDOCAINE HYDROCHLORIDE AND EPINEPHRINE 10; 10 MG/ML; UG/ML
20 INJECTION, SOLUTION INFILTRATION; PERINEURAL ONCE
Status: COMPLETED | OUTPATIENT
Start: 2023-02-09 | End: 2023-02-09

## 2023-02-09 RX ORDER — ONDANSETRON 2 MG/ML
4 INJECTION INTRAMUSCULAR; INTRAVENOUS
Status: COMPLETED | OUTPATIENT
Start: 2023-02-09 | End: 2023-02-09

## 2023-02-09 RX ORDER — CEPHALEXIN 500 MG/1
500 CAPSULE ORAL 4 TIMES DAILY
Qty: 28 CAPSULE | Refills: 0 | Status: SHIPPED | OUTPATIENT
Start: 2023-02-09 | End: 2023-02-16

## 2023-02-09 RX ORDER — 0.9 % SODIUM CHLORIDE 0.9 %
100 INTRAVENOUS SOLUTION INTRAVENOUS ONCE
Status: COMPLETED | OUTPATIENT
Start: 2023-02-09 | End: 2023-02-09

## 2023-02-09 RX ORDER — SODIUM CHLORIDE 0.9 % (FLUSH) 0.9 %
10 SYRINGE (ML) INJECTION
Status: COMPLETED | OUTPATIENT
Start: 2023-02-09 | End: 2023-02-09

## 2023-02-09 RX ORDER — CEPHALEXIN 500 MG/1
500 CAPSULE ORAL
Status: COMPLETED | OUTPATIENT
Start: 2023-02-09 | End: 2023-02-09

## 2023-02-09 RX ORDER — SULFAMETHOXAZOLE AND TRIMETHOPRIM 800; 160 MG/1; MG/1
1 TABLET ORAL
Status: COMPLETED | OUTPATIENT
Start: 2023-02-09 | End: 2023-02-09

## 2023-02-09 RX ADMIN — ONDANSETRON 4 MG: 2 INJECTION INTRAMUSCULAR; INTRAVENOUS at 06:36

## 2023-02-09 RX ADMIN — SODIUM CHLORIDE, PRESERVATIVE FREE 10 ML: 5 INJECTION INTRAVENOUS at 05:59

## 2023-02-09 RX ADMIN — LIDOCAINE HYDROCHLORIDE,EPINEPHRINE BITARTRATE 20 ML: 10; .01 INJECTION, SOLUTION INFILTRATION; PERINEURAL at 06:57

## 2023-02-09 RX ADMIN — SODIUM CHLORIDE 100 ML: 9 INJECTION, SOLUTION INTRAVENOUS at 05:59

## 2023-02-09 RX ADMIN — MORPHINE SULFATE 2 MG: 2 INJECTION, SOLUTION INTRAMUSCULAR; INTRAVENOUS at 06:37

## 2023-02-09 RX ADMIN — SULFAMETHOXAZOLE AND TRIMETHOPRIM 1 TABLET: 800; 160 TABLET ORAL at 08:26

## 2023-02-09 RX ADMIN — IOPAMIDOL 100 ML: 755 INJECTION, SOLUTION INTRAVENOUS at 05:59

## 2023-02-09 RX ADMIN — CEPHALEXIN 500 MG: 500 CAPSULE ORAL at 08:26

## 2023-02-09 ASSESSMENT — PAIN DESCRIPTION - ORIENTATION: ORIENTATION: LEFT

## 2023-02-09 ASSESSMENT — PAIN SCALES - GENERAL
PAINLEVEL_OUTOF10: 6
PAINLEVEL_OUTOF10: 8

## 2023-02-09 ASSESSMENT — PAIN DESCRIPTION - LOCATION
LOCATION: HEAD
LOCATION: NECK
LOCATION: OTHER (COMMENT)

## 2023-02-09 ASSESSMENT — LIFESTYLE VARIABLES
HOW OFTEN DO YOU HAVE A DRINK CONTAINING ALCOHOL: NEVER
HOW MANY STANDARD DRINKS CONTAINING ALCOHOL DO YOU HAVE ON A TYPICAL DAY: PATIENT DOES NOT DRINK

## 2023-02-09 ASSESSMENT — ENCOUNTER SYMPTOMS
COUGH: 1
WHEEZING: 1

## 2023-02-09 ASSESSMENT — PAIN - FUNCTIONAL ASSESSMENT: PAIN_FUNCTIONAL_ASSESSMENT: 0-10

## 2023-02-09 NOTE — ED TRIAGE NOTES
Patient is having neck pain. There is a mass behind her left ear that goes up into her scalp. She did go to urgent care a week ago and got antibiotics. She finished those but it is not getting better.

## 2023-02-09 NOTE — DISCHARGE INSTRUCTIONS
Take antibiotics as prescribed. Follow-up with family medicine in 3 to 4 days. Return to the ER for worsening or worrisome symptoms.

## 2023-02-09 NOTE — ED PROVIDER NOTES
Emergency Department Provider Note                   PCP:                Thao Cloud MD               Age: 25 y.o. Sex: female       ICD-10-CM    1. Abscess  L02.91           DISPOSITION Decision To Discharge 02/09/2023 07:15:09 AM        Medical Decision Making  20-year-old female presents to the emergency department with area of pain and swelling to the posterior neck. States initially she was told this was not an infection and given Bactrim. Initially improved size and swelling of the area. Since being done with Bactrim for the past few days, symptoms have worsened with size rapidly increasing. No difficulty breathing. No fever or chills. Lab work shows white count 15,000, stable H&H, normal electrolytes and kidney function, normal LFTs, lactic is 1, 1 blood culture was obtained. CT scan shows 2 x 2 abscess posterior neck with surrounding cellulitis. After informed consent, I&D was performed which produced serosanguineous and purulent drainage. Will place on Keflex and Bactrim. First dose given here in the ER. Patient advised to return to the ER for any worsening or worrisome symptoms. Otherwise she will see her family physician. Patient voiced understanding and agreement with this plan. Amount and/or Complexity of Data Reviewed  External Data Reviewed: notes. Labs: ordered. Decision-making details documented in ED Course. Radiology: ordered and independent interpretation performed. Details: no Subcutaneous emphysema    Risk  Prescription drug management.                                 Orders Placed This Encounter   Procedures    Blood Culture 1    CT SOFT TISSUE NECK W CONTRAST    CBC with Auto Differential    CMP    Lactate, Sepsis    HCG Qualitative, Serum    POC PREGNANCY UR-QUAL        Medications   cephALEXin (KEFLEX) capsule 500 mg (has no administration in time range)   sulfamethoxazole-trimethoprim (BACTRIM DS;SEPTRA DS) 800-160 MG per tablet 1 tablet (has no administration in time range)   0.9 % sodium chloride bolus (0 mLs IntraVENous Stopped 2/9/23 0636)   sodium chloride flush 0.9 % injection 10 mL (10 mLs IntraVENous Given 2/9/23 0559)   iopamidol (ISOVUE-370) 76 % injection 100 mL (100 mLs IntraVENous Given 2/9/23 0559)   morphine injection 2 mg (2 mg IntraVENous Given 2/9/23 0637)   ondansetron (ZOFRAN) injection 4 mg (4 mg IntraVENous Given 2/9/23 0636)   lidocaine-EPINEPHrine 1 %-1:317072 injection 20 mL (20 mLs IntraDERmal Given 2/9/23 0657)       New Prescriptions    CEPHALEXIN (KEFLEX) 500 MG CAPSULE    Take 1 capsule by mouth 4 times daily for 7 days    SULFAMETHOXAZOLE-TRIMETHOPRIM (BACTRIM DS) 800-160 MG PER TABLET    Take 1 tablet by mouth 2 times daily for 7 days        Lakisha Najera is a 25 y.o. female who presents to the Emergency Department with chief complaint of  No chief complaint on file. 59-year-old female history of eczema and asthma presents to the emergency department with worsening pain and swelling to the area base of her scalp and posterior neck. States initially began a few weeks ago but states it was much smaller. She reports family physician prescribed her Bactrim which improved the size but it did not resolve. Since completing antibiotics he has rapidly increased in size and worsening discomfort. Reports pain with movement of her neck now. Denies difficulty breathing. Denies fever, chills, nausea or vomiting.         Review of Systems    Past Medical History:   Diagnosis Date    ADHD     Adverse effect of anesthesia 06/2021    chose not to have block pre-op 6/2021- took longer to wake up and woke up screaming in pain and PONV    Anxiety     Asthma     proair prn now- 2/1/22 avg once weekly    Depression     Eczema     dupixent    Esophagitis, eosinophilic     no current medications/infusions    Gastrointestinal disorder     esophageal disorder- previously on infusions    Hx MRSA infection 06/2021    L ankle    Marijuana smoker 02/01/2022    twice daily per pt    Migraine     PONV (postoperative nausea and vomiting) 06/2021    PONV, relieved with zofran 8 mg- per patient    Suspected sleep apnea     \"I'm supposed to be tested\"    Samaria-Parkinson-White syndrome         Past Surgical History:   Procedure Laterality Date    APPENDECTOMY  2016    CHOLECYSTECTOMY  2016    ORTHOPEDIC SURGERY  06/2021    LEFT ANKLE HARDWARE REMOVAL     ORTHOPEDIC SURGERY Left 02/2022    binta in ankle    ORTHOPEDIC SURGERY Left 04/19/2021    ORIF of Bimalleolar ankle fracture    ORTHOPEDIC SURGERY  02/2022    binta in ankle removed    UPPER GASTROINTESTINAL ENDOSCOPY          Family History   Problem Relation Age of Onset    Hypertension Mother     Diabetes type 2  Mother     Hypertension Father         Social History     Socioeconomic History    Marital status: Single   Tobacco Use    Smoking status: Never    Smokeless tobacco: Never   Vaping Use    Vaping Use: Never used   Substance and Sexual Activity    Alcohol use: No    Drug use: Yes     Types: Marijuana (Weed)     Comment: a few times a week    Sexual activity: Not Currently     Partners: Female     Birth control/protection: None   Social History Narrative    Patient states feeling safe at home, denies abuse. Latex and Prednisone     Previous Medications    ALBUTEROL SULFATE HFA (PROVENTIL;VENTOLIN;PROAIR) 108 (90 BASE) MCG/ACT INHALER    Inhale 2 puffs into the lungs every 4 hours as needed for Wheezing or Shortness of Breath    KETOCONAZOLE (NIZORAL) 2 % SHAMPOO    Apply topically daily as needed. MONTELUKAST (SINGULAIR) 10 MG TABLET    Take 1 tablet by mouth nightly    MUPIROCIN (BACTROBAN) 2 % CREAM    Apply 2 times daily for 10 days.     NAPROXEN (NAPROSYN) 500 MG TABLET    Take 1 tablet by mouth 2 times daily (with meals) for 20 days    NORETHINDRONE-ETHINYL ESTRADIOL-FE (LO LOESTRIN FE) 1 MG-10 MCG / 10 MCG TABLET    Take 1 tablet by mouth daily    ONDANSETRON (ZOFRAN) 4 MG TABLET    Take 1 tablet by mouth 3 times daily as needed for Nausea or Vomiting    RIZATRIPTAN (MAXALT) 10 MG TABLET    Take 10 mg as needed for headache. if symptoms persist or return, may repeat dose after 2 hours. No more than two pills in 24 hour period. TRIAMCINOLONE (KENALOG) 0.1 % OINTMENT    Apply topically daily as needed        Vitals signs and nursing note reviewed. Patient Vitals for the past 4 hrs:   Temp Pulse Resp BP SpO2   02/09/23 0637 -- -- 16 -- --   02/09/23 0454 98.3 °F (36.8 °C) -- -- -- --   02/09/23 0450 -- (!) 101 18 114/77 98 %          Physical Exam  Vitals and nursing note reviewed. Constitutional:       General: She is not in acute distress. Appearance: Normal appearance. HENT:      Head: Normocephalic. Nose: Nose normal.      Mouth/Throat:      Mouth: Mucous membranes are moist.   Eyes:      Extraocular Movements: Extraocular movements intact. Neck:        Comments: Left-sided posterior neck: Approximately 8 cm in diameter area of induration, tenderness and swelling, firm, no fluctuance, minimal if any erythema overlying without pustule noted. Cardiovascular:      Rate and Rhythm: Regular rhythm. Tachycardia present. Pulses: Normal pulses. Heart sounds: Normal heart sounds. Pulmonary:      Effort: Pulmonary effort is normal. No respiratory distress. Breath sounds: Normal breath sounds. Abdominal:      General: Abdomen is flat. Palpations: Abdomen is soft. Tenderness: There is no abdominal tenderness. Musculoskeletal:         General: No swelling. Normal range of motion. Cervical back: Normal range of motion. Tenderness present. No rigidity. Skin:     General: Skin is warm. Findings: No rash. Neurological:      General: No focal deficit present. Mental Status: She is alert and oriented to person, place, and time.    Psychiatric:         Mood and Affect: Mood normal. Incision/Drainage    Date/Time: 2/9/2023 7:20 AM  Performed by: Kiya Vizcarra DO  Authorized by: Kiya Vizcarra DO     Consent:     Consent obtained:  Verbal    Consent given by:  Patient    Risks, benefits, and alternatives were discussed: yes      Risks discussed:  Bleeding, incomplete drainage, pain, damage to other organs and infection    Alternatives discussed:  No treatment  Universal protocol:     Patient identity confirmed:  Verbally with patient  Location:     Type:  Abscess    Location:  Neck    Neck location:  L posterior  Pre-procedure details:     Skin preparation:  Chlorhexidine  Sedation:     Sedation type:  None  Anesthesia:     Anesthesia method:  Topical application  Procedure type:     Complexity:  Complex  Procedure details:     Ultrasound guidance: yes      Incision types:  Stab incision    Wound management:  Probed and deloculated, irrigated with saline and extensive cleaning    Drainage:  Purulent and serosanguinous    Drainage amount: Moderate    Wound treatment:  Wound left open    Packing materials:  None  Post-procedure details:     Procedure completion:  Tolerated well, no immediate complications    Results for orders placed or performed during the hospital encounter of 02/09/23   CT SOFT TISSUE NECK W CONTRAST    Narrative    EXAMINATION: CT SOFT TISSUE NECK W CONTRAST    DATE: 2/9/2023 5:46 AM     INDICATION: Pain, induration, and swelling to left side of posterior neck. COMPARISON: None available. TECHNIQUE: Following the uneventful intravenous administration of 100 mL of   Isovue-370 contrast material, axial postcontrast images were obtained through   the neck. Coronal and sagittal reformatted images were created. There were no   immediate complications. CT dose lowering techniques were used, to include:   automated exposure control, adjustment for patient size, and or use of iterative   reconstruction.     FINDINGS:    AERODIGESTIVE TRACT: Normal appearance of the aerodigestive tract. No convincing   mass lesion or fluid collection. No retropharyngeal or peritonsillar abscess.   The trachea is midline.    LYMPH NODES: Scattered left level V lymph nodes which are presumably reactive.    PAROTID GLANDS: Normal    SUBMANDIBULAR GLANDS: Normal    THYROID GLAND: Normal    VISUALIZED BRAIN: No acute abnormality in the imaged portions the brain.    VISUALIZED ORBITS: Unremarkable.    VASCULAR: Normal    LUNGS: Lung apices are clear.    VISUALIZED PARANASAL SINUSES: Well aerated    VISUALIZED MASTOID AIR CELLS: Well aerated    BONES: No acute osseous abnormality or suspicious osseous lesion. 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    SOFT TISSUES: Peripherally enhancing fluid collection within the left posterior   neck subcutaneous fat measuring 2.3 x 1.9 x 2.3 cm compatible with soft tissue   abscess. Surrounding edema within the left suboccipital soft tissues extending   inferiorly to approximately the C5-C6 level. There is stranding throughout the   subcutaneous fat with thickening of the overlying skin compatible with adjacent   cellulitis. Mild edema within the adjacent posterior paraspinal musculature.        Impression    1. Soft tissue abscess within the subcutaneous fat of the left posterior neck   measuring 2.3 x 1.9 x 2.3 cm with surrounding cellulitis.  2. Reactive left-sided cervical lymphadenopathy.     Noah Davis M.D.   2/9/2023 6:22:00 AM   CBC with Auto Differential   Result Value Ref Range    WBC 15.0 (H) 4.3 - 11.1 K/uL    RBC 4.73 4.05 - 5.2 M/uL    Hemoglobin 14.0 11.7 - 15.4 g/dL    Hematocrit 42.4 35.8 - 46.3 %    MCV 89.6 82.0 - 102.0 FL    MCH 29.6 26.1 - 32.9 PG    MCHC 33.0 31.4 - 35.0 g/dL    RDW 12.6 11.9 - 14.6 %    Platelets 231 150 - 450 K/uL    MPV 10.5 9.4 - 12.3 FL    nRBC 0.00 0.0 - 0.2 K/uL    Differential Type AUTOMATED      Seg Neutrophils 82 (H) 43 - 78 %    Lymphocytes 11 (L) 13 - 44 %    Monocytes 5 4.0 - 12.0 %     Eosinophils % 1 0.5 - 7.8 %    Basophils 0 0.0 - 2.0 %    Immature Granulocytes 0 0.0 - 5.0 %    Segs Absolute 12.3 (H) 1.7 - 8.2 K/UL    Absolute Lymph # 1.7 0.5 - 4.6 K/UL    Absolute Mono # 0.8 0.1 - 1.3 K/UL    Absolute Eos # 0.2 0.0 - 0.8 K/UL    Basophils Absolute 0.0 0.0 - 0.2 K/UL    Absolute Immature Granulocyte 0.1 0.0 - 0.5 K/UL   CMP   Result Value Ref Range    Sodium 138 133 - 143 mmol/L    Potassium 4.0 3.5 - 5.1 mmol/L    Chloride 108 101 - 110 mmol/L    CO2 26 21 - 32 mmol/L    Anion Gap 4 2 - 11 mmol/L    Glucose 96 65 - 100 mg/dL    BUN 9 6 - 23 MG/DL    Creatinine 0.81 0.6 - 1.0 MG/DL    Est, Glom Filt Rate >60 >60 ml/min/1.73m2    Calcium 8.8 8.3 - 10.4 MG/DL    Total Bilirubin 0.4 0.2 - 1.1 MG/DL    ALT 19 12 - 65 U/L    AST 11 (L) 15 - 37 U/L    Alk Phosphatase 94 50 - 130 U/L    Total Protein 7.1 6.3 - 8.2 g/dL    Albumin 3.6 3.5 - 5.0 g/dL    Globulin 3.5 2.8 - 4.5 g/dL    Albumin/Globulin Ratio 1.0 0.4 - 1.6     Lactate, Sepsis   Result Value Ref Range    Lactic Acid, Sepsis 1.0 0.4 - 2.0 MMOL/L   HCG Qualitative, Serum   Result Value Ref Range    HCG, Ql. Negative NEG          CT SOFT TISSUE NECK W CONTRAST   Final Result   1. Soft tissue abscess within the subcutaneous fat of the left posterior neck    measuring 2.3 x 1.9 x 2.3 cm with surrounding cellulitis. 2. Reactive left-sided cervical lymphadenopathy. Bradley Sandhu M.D.    2/9/2023 6:22:00 AM                          Voice dictation software was used during the making of this note. This software is not perfect and grammatical and other typographical errors may be present. This note has not been completely proofread for errors.           Kiya Vizcarra DO  02/09/23 [id number]

## 2023-02-09 NOTE — LETTER
[facility] EMERGENCY DEPT  [address] [place] [address]  Phone: [phone]  Fax: [phone]               February 9, 2023    Patient: Stephanie Navas   YOB: 2000   Date of Visit: 2/9/2023       To Whom It May Concern:    Graciela Sarmiento was seen and treated in our emergency department on 2/9/2023. She may return to work tomorrow, February  10, 2023 .       Sincerely,       Lilli Wright RN         Signature:__________________________________

## 2023-02-12 LAB
BACTERIA SPEC CULT: NORMAL
SERVICE CMNT-IMP: NORMAL

## 2023-02-13 ENCOUNTER — CARE COORDINATION (OUTPATIENT)
Dept: CARE COORDINATION | Facility: CLINIC | Age: 23
End: 2023-02-13

## 2023-02-13 NOTE — CARE COORDINATION
Outreach attempted today to discuss enrollment in AC services. Unable to reach patient. Left HIPAA compliant voice mail. Also sent message through [address] Will notify Klarissa Andrew RN, [facility].

## 2023-04-05 ENCOUNTER — OFFICE VISIT (OUTPATIENT)
Dept: FAMILY MEDICINE CLINIC | Facility: CLINIC | Age: 23
End: 2023-04-05
Payer: COMMERCIAL

## 2023-04-05 VITALS
HEART RATE: 113 BPM | OXYGEN SATURATION: 99 % | HEIGHT: 63 IN | TEMPERATURE: 97.8 F | BODY MASS INDEX: 21.15 KG/M2 | WEIGHT: 119.38 LBS

## 2023-04-05 DIAGNOSIS — F33.1 MODERATE EPISODE OF RECURRENT MAJOR DEPRESSIVE DISORDER (HCC): ICD-10-CM

## 2023-04-05 DIAGNOSIS — F41.9 ANXIETY: ICD-10-CM

## 2023-04-05 DIAGNOSIS — G43.909 MIGRAINE WITHOUT STATUS MIGRAINOSUS, NOT INTRACTABLE, UNSPECIFIED MIGRAINE TYPE: Primary | ICD-10-CM

## 2023-04-05 DIAGNOSIS — J45.20 MILD INTERMITTENT ASTHMA WITHOUT COMPLICATION: ICD-10-CM

## 2023-04-05 DIAGNOSIS — R19.5 CHANGE IN STOOL: ICD-10-CM

## 2023-04-05 PROCEDURE — 99204 OFFICE O/P NEW MOD 45 MIN: CPT | Performed by: FAMILY MEDICINE

## 2023-04-05 RX ORDER — BUSPIRONE HYDROCHLORIDE 5 MG/1
5 TABLET ORAL 2 TIMES DAILY
Qty: 60 TABLET | Refills: 0 | Status: SHIPPED | OUTPATIENT
Start: 2023-04-05 | End: 2023-05-05

## 2023-04-05 RX ORDER — FLUTICASONE PROPIONATE AND SALMETEROL 100; 50 UG/1; UG/1
1 POWDER RESPIRATORY (INHALATION) EVERY 12 HOURS
Qty: 1 EACH | Refills: 2 | Status: SHIPPED | OUTPATIENT
Start: 2023-04-05

## 2023-04-05 RX ORDER — ALBUTEROL SULFATE 90 UG/1
2 AEROSOL, METERED RESPIRATORY (INHALATION) EVERY 4 HOURS PRN
Qty: 18 G | Refills: 2 | Status: SHIPPED | OUTPATIENT
Start: 2023-04-05

## 2023-04-05 SDOH — ECONOMIC STABILITY: FOOD INSECURITY: WITHIN THE PAST 12 MONTHS, YOU WORRIED THAT YOUR FOOD WOULD RUN OUT BEFORE YOU GOT MONEY TO BUY MORE.: NEVER TRUE

## 2023-04-05 SDOH — ECONOMIC STABILITY: INCOME INSECURITY: HOW HARD IS IT FOR YOU TO PAY FOR THE VERY BASICS LIKE FOOD, HOUSING, MEDICAL CARE, AND HEATING?: SOMEWHAT HARD

## 2023-04-05 SDOH — ECONOMIC STABILITY: HOUSING INSECURITY
IN THE LAST 12 MONTHS, WAS THERE A TIME WHEN YOU DID NOT HAVE A STEADY PLACE TO SLEEP OR SLEPT IN A SHELTER (INCLUDING NOW)?: NO

## 2023-04-05 SDOH — ECONOMIC STABILITY: FOOD INSECURITY: WITHIN THE PAST 12 MONTHS, THE FOOD YOU BOUGHT JUST DIDN'T LAST AND YOU DIDN'T HAVE MONEY TO GET MORE.: NEVER TRUE

## 2023-04-05 ASSESSMENT — PATIENT HEALTH QUESTIONNAIRE - PHQ9
4. FEELING TIRED OR HAVING LITTLE ENERGY: 3
SUM OF ALL RESPONSES TO PHQ QUESTIONS 1-9: 18
6. FEELING BAD ABOUT YOURSELF - OR THAT YOU ARE A FAILURE OR HAVE LET YOURSELF OR YOUR FAMILY DOWN: 1
3. TROUBLE FALLING OR STAYING ASLEEP: 3
8. MOVING OR SPEAKING SO SLOWLY THAT OTHER PEOPLE COULD HAVE NOTICED. OR THE OPPOSITE, BEING SO FIGETY OR RESTLESS THAT YOU HAVE BEEN MOVING AROUND A LOT MORE THAN USUAL: 3
2. FEELING DOWN, DEPRESSED OR HOPELESS: 2
5. POOR APPETITE OR OVEREATING: 3
7. TROUBLE CONCENTRATING ON THINGS, SUCH AS READING THE NEWSPAPER OR WATCHING TELEVISION: 0
SUM OF ALL RESPONSES TO PHQ9 QUESTIONS 1 & 2: 5
1. LITTLE INTEREST OR PLEASURE IN DOING THINGS: 3
SUM OF ALL RESPONSES TO PHQ QUESTIONS 1-9: 18
9. THOUGHTS THAT YOU WOULD BE BETTER OFF DEAD, OR OF HURTING YOURSELF: 0
SUM OF ALL RESPONSES TO PHQ QUESTIONS 1-9: 18
SUM OF ALL RESPONSES TO PHQ QUESTIONS 1-9: 18
10. IF YOU CHECKED OFF ANY PROBLEMS, HOW DIFFICULT HAVE THESE PROBLEMS MADE IT FOR YOU TO DO YOUR WORK, TAKE CARE OF THINGS AT HOME, OR GET ALONG WITH OTHER PEOPLE: 1

## 2023-04-05 ASSESSMENT — ENCOUNTER SYMPTOMS
COUGH: 0
SHORTNESS OF BREATH: 0

## 2023-04-05 NOTE — PROGRESS NOTES
Sheri Sims (: 2000) is a 25 y.o. female, new patient, here for evaluation of the following chief complaint(s):  New Patient       ASSESSMENT/PLAN:  1. Migraine without status migrainosus, not intractable, unspecified migraine type  2. Mild intermittent asthma without complication  -     albuterol sulfate HFA (PROVENTIL;VENTOLIN;PROAIR) 108 (90 Base) MCG/ACT inhaler; Inhale 2 puffs into the lungs every 4 hours as needed for Wheezing or Shortness of Breath, Disp-18 g, R-2Normal  -     fluticasone-salmeterol (ADVAIR) 100-50 MCG/ACT AEPB diskus inhaler; Inhale 1 puff into the lungs in the morning and 1 puff in the evening., Disp-1 each, R-2Normal  3. Moderate episode of recurrent major depressive disorder ([facility] [address]  4. Anxiety  -     busPIRone (BUSPAR) 5 MG tablet; Take 1 tablet by mouth 2 times daily, Disp-60 tablet, R-0Normal  5. Change in stool  -     [address] [facility]      We will monitor migraine frequency, this has increased in the last week. Continue Maxalt as needed. Asthma does not seem well managed, does not appear to be an acute exacerbation, will add Advair daily to see if that will help. Anxiety has worsened, will try low-dose BuSpar. She has had recent stool changes, intermittent abdominal pain. Will refer to GI for further evaluation. Return in about 2 weeks (around 2023) for follow up asthma, weight . SUBJECTIVE/OBJECTIVE:  HPI  20-year-old female with a history of asthma, anxiety, depression who presents to [facility]. She was diagnosed Samaria-Parkinson-White in eighth grade, start cardiology at that time. They said she did not need to follow-up unless she was having any issues. She is been using her inhaler more frequently, so she is did not does not tolerate prednisone. Denies any current shortness of breath or wheezing. She denies any recent illnesses. Denies any allergy type symptoms.     She said she had been referred to

## 2023-04-06 ENCOUNTER — APPOINTMENT (RX ONLY)
Dept: URBAN - METROPOLITAN AREA CLINIC 329 | Facility: CLINIC | Age: 23
Setting detail: DERMATOLOGY
End: 2023-04-06

## 2023-04-06 DIAGNOSIS — L20.89 OTHER ATOPIC DERMATITIS: ICD-10-CM | Status: INADEQUATELY CONTROLLED

## 2023-04-06 PROBLEM — L20.84 INTRINSIC (ALLERGIC) ECZEMA: Status: ACTIVE | Noted: 2023-04-06

## 2023-04-06 PROCEDURE — ? PRESCRIPTION

## 2023-04-06 PROCEDURE — ? COUNSELING

## 2023-04-06 PROCEDURE — 99204 OFFICE O/P NEW MOD 45 MIN: CPT

## 2023-04-06 PROCEDURE — ? ORDER TESTS

## 2023-04-06 PROCEDURE — ? ADDITIONAL NOTES

## 2023-04-06 RX ORDER — UPADACITINIB 15 MG/1
TABLET, EXTENDED RELEASE ORAL
Qty: 30 | Refills: 6 | Status: ACTIVE

## 2023-04-06 RX ORDER — TRIAMCINOLONE ACETONIDE 1 MG/G
CREAM TOPICAL
Qty: 454 | Refills: 3 | Status: ERX | COMMUNITY
Start: 2023-04-06

## 2023-04-06 RX ADMIN — TRIAMCINOLONE ACETONIDE: 1 CREAM TOPICAL at 00:00

## 2023-04-06 ASSESSMENT — LOCATION DETAILED DESCRIPTION DERM
LOCATION DETAILED: RIGHT SUPERIOR LATERAL NECK
LOCATION DETAILED: LEFT POSTERIOR SHOULDER
LOCATION DETAILED: RIGHT MEDIAL MALAR CHEEK
LOCATION DETAILED: RIGHT INFERIOR MEDIAL MIDBACK
LOCATION DETAILED: RIGHT PROXIMAL PRETIBIAL REGION
LOCATION DETAILED: LEFT PROXIMAL PRETIBIAL REGION
LOCATION DETAILED: RIGHT DISTAL PRETIBIAL REGION
LOCATION DETAILED: LEFT INFERIOR LATERAL UPPER BACK
LOCATION DETAILED: LEFT ULNAR DORSAL HAND
LOCATION DETAILED: LEFT INFERIOR LATERAL FOREHEAD
LOCATION DETAILED: LEFT LATERAL NECK
LOCATION DETAILED: LEFT INFERIOR HELIX
LOCATION DETAILED: LEFT DISTAL PRETIBIAL REGION
LOCATION DETAILED: RIGHT DORSAL MIDDLE METACARPOPHALANGEAL JOINT

## 2023-04-06 ASSESSMENT — LOCATION SIMPLE DESCRIPTION DERM
LOCATION SIMPLE: LEFT HAND
LOCATION SIMPLE: RIGHT HAND
LOCATION SIMPLE: LEFT UPPER BACK
LOCATION SIMPLE: RIGHT CHEEK
LOCATION SIMPLE: LEFT FOREHEAD
LOCATION SIMPLE: POSTERIOR NECK
LOCATION SIMPLE: LEFT SHOULDER
LOCATION SIMPLE: RIGHT LOWER BACK
LOCATION SIMPLE: LEFT PRETIBIAL REGION
LOCATION SIMPLE: RIGHT PRETIBIAL REGION
LOCATION SIMPLE: LEFT EAR

## 2023-04-06 ASSESSMENT — BSA ECZEMA: % BODY COVERED IN ECZEMA: 41

## 2023-04-06 ASSESSMENT — ITCH NUMERIC RATING SCALE: HOW SEVERE IS YOUR ITCHING?: 6

## 2023-04-06 ASSESSMENT — LOCATION ZONE DERM
LOCATION ZONE: HAND
LOCATION ZONE: FACE
LOCATION ZONE: TRUNK
LOCATION ZONE: ARM
LOCATION ZONE: LEG
LOCATION ZONE: EAR
LOCATION ZONE: NECK

## 2023-04-06 ASSESSMENT — BSA RASH: BSA RASH: 41

## 2023-04-06 NOTE — PROCEDURE: ORDER TESTS
Bill For Surgical Tray: no
Billing Type: Third-Party Bill
Performing Laboratory: [id number]
Expected Date Of Service: 04/06/2023

## 2023-04-06 NOTE — PROCEDURE: ADDITIONAL NOTES
Detail Level: Simple
Render Risk Assessment In Note?: no
Additional Notes: Patient has tried and failed Triamcinolone ointment and Dupixent over a concurrent 15 month time period

## 2023-04-26 ENCOUNTER — OFFICE VISIT (OUTPATIENT)
Dept: FAMILY MEDICINE CLINIC | Facility: CLINIC | Age: 23
End: 2023-04-26
Payer: COMMERCIAL

## 2023-04-26 VITALS
DIASTOLIC BLOOD PRESSURE: 70 MMHG | TEMPERATURE: 98.1 F | WEIGHT: 122.13 LBS | HEIGHT: 63 IN | SYSTOLIC BLOOD PRESSURE: 120 MMHG | BODY MASS INDEX: 21.64 KG/M2 | HEART RATE: 88 BPM | OXYGEN SATURATION: 98 %

## 2023-04-26 DIAGNOSIS — F41.9 ANXIETY: ICD-10-CM

## 2023-04-26 DIAGNOSIS — J45.20 MILD INTERMITTENT ASTHMA WITHOUT COMPLICATION: ICD-10-CM

## 2023-04-26 DIAGNOSIS — R53.83 OTHER FATIGUE: Primary | ICD-10-CM

## 2023-04-26 LAB
BASOPHILS # BLD: 0 K/UL (ref 0–0.2)
BASOPHILS NFR BLD: 0 % (ref 0–2)
DIFFERENTIAL METHOD BLD: NORMAL
EOSINOPHIL # BLD: 0.1 K/UL (ref 0–0.8)
EOSINOPHIL NFR BLD: 2 % (ref 0.5–7.8)
ERYTHROCYTE [DISTWIDTH] IN BLOOD BY AUTOMATED COUNT: 13.2 % (ref 11.9–14.6)
HCT VFR BLD AUTO: 42.6 % (ref 35.8–46.3)
HGB BLD-MCNC: 14 G/DL (ref 11.7–15.4)
IMM GRANULOCYTES # BLD AUTO: 0 K/UL (ref 0–0.5)
IMM GRANULOCYTES NFR BLD AUTO: 0 % (ref 0–5)
LYMPHOCYTES # BLD: 2.4 K/UL (ref 0.5–4.6)
LYMPHOCYTES NFR BLD: 30 % (ref 13–44)
MCH RBC QN AUTO: 30.8 PG (ref 26.1–32.9)
MCHC RBC AUTO-ENTMCNC: 32.9 G/DL (ref 31.4–35)
MCV RBC AUTO: 93.8 FL (ref 82–102)
MONOCYTES # BLD: 0.4 K/UL (ref 0.1–1.3)
MONOCYTES NFR BLD: 5 % (ref 4–12)
NEUTS SEG # BLD: 4.9 K/UL (ref 1.7–8.2)
NEUTS SEG NFR BLD: 63 % (ref 43–78)
NRBC # BLD: 0 K/UL (ref 0–0.2)
PLATELET # BLD AUTO: 198 K/UL (ref 150–450)
PMV BLD AUTO: 11.5 FL (ref 9.4–12.3)
RBC # BLD AUTO: 4.54 M/UL (ref 4.05–5.2)
WBC # BLD AUTO: 7.8 K/UL (ref 4.3–11.1)

## 2023-04-26 PROCEDURE — 99214 OFFICE O/P EST MOD 30 MIN: CPT | Performed by: FAMILY MEDICINE

## 2023-04-26 RX ORDER — BUSPIRONE HYDROCHLORIDE 5 MG/1
5 TABLET ORAL 3 TIMES DAILY
Qty: 90 TABLET | Refills: 3 | Status: SHIPPED | OUTPATIENT
Start: 2023-04-26 | End: 2023-05-26

## 2023-04-26 ASSESSMENT — PATIENT HEALTH QUESTIONNAIRE - PHQ9
SUM OF ALL RESPONSES TO PHQ QUESTIONS 1-9: 13
SUM OF ALL RESPONSES TO PHQ QUESTIONS 1-9: 13
8. MOVING OR SPEAKING SO SLOWLY THAT OTHER PEOPLE COULD HAVE NOTICED. OR THE OPPOSITE, BEING SO FIGETY OR RESTLESS THAT YOU HAVE BEEN MOVING AROUND A LOT MORE THAN USUAL: 1
SUM OF ALL RESPONSES TO PHQ QUESTIONS 1-9: 13
3. TROUBLE FALLING OR STAYING ASLEEP: 3
4. FEELING TIRED OR HAVING LITTLE ENERGY: 3
SUM OF ALL RESPONSES TO PHQ9 QUESTIONS 1 & 2: 2
7. TROUBLE CONCENTRATING ON THINGS, SUCH AS READING THE NEWSPAPER OR WATCHING TELEVISION: 3
1. LITTLE INTEREST OR PLEASURE IN DOING THINGS: 1
5. POOR APPETITE OR OVEREATING: 1
2. FEELING DOWN, DEPRESSED OR HOPELESS: 1
6. FEELING BAD ABOUT YOURSELF - OR THAT YOU ARE A FAILURE OR HAVE LET YOURSELF OR YOUR FAMILY DOWN: 0
10. IF YOU CHECKED OFF ANY PROBLEMS, HOW DIFFICULT HAVE THESE PROBLEMS MADE IT FOR YOU TO DO YOUR WORK, TAKE CARE OF THINGS AT HOME, OR GET ALONG WITH OTHER PEOPLE: 1
SUM OF ALL RESPONSES TO PHQ QUESTIONS 1-9: 13
9. THOUGHTS THAT YOU WOULD BE BETTER OFF DEAD, OR OF HURTING YOURSELF: 0

## 2023-04-26 ASSESSMENT — ENCOUNTER SYMPTOMS
SHORTNESS OF BREATH: 0
COUGH: 0

## 2023-04-26 NOTE — PROGRESS NOTES
Felicia Worley (: 2000) is a 25 y.o. female, established patient, here for evaluation of the following chief complaint(s):  Follow-up (Fatigue/body pain)       ASSESSMENT/PLAN:  1. Other fatigue  -     CBC with Auto Differential; Future  -     TSH; Future  -     Comprehensive Metabolic Panel; Future  2. Anxiety  -     busPIRone (BUSPAR) 5 MG tablet; Take 1 tablet by mouth 3 times daily, Disp-90 tablet, R-3Normal  3. Mild intermittent asthma without complication    Will check basic blood work, increase buspar for anxiety to TID. Gi referral pending, dicussed what symptoms would prompt ED visit. Will monitor for now and follow labs/symptoms. Asthma much improved. Continue advair. Return in about 1 month (around 2023) for follow up anxiety. SUBJECTIVE/OBJECTIVE:  HPI    51-year-old female with history of asthma presents follow-up. She says her breathing has been much improved with advair, denies any albuterol use or SOB. The buspar is helping anxiety but wears off after a few hours. She has not heard from GI. She has severe fatigue, generalized body pain, no nausea, vomiting or decreased appetite. Has intermittent abdominal pain, multiple stools per day. No fevers or urinary symptoms. Period due in a few days. Review of Systems   Constitutional:  Positive for fatigue. Negative for activity change, appetite change, fever and unexpected weight change. Respiratory:  Negative for cough and shortness of breath. Cardiovascular:  Negative for chest pain and palpitations. Skin:  Negative for rash. Neurological:  Negative for dizziness and headaches. Psychiatric/Behavioral:  Negative for sleep disturbance. Vitals:    23 1122   BP: 120/70   Pulse: 88   Temp: 98.1 °F (36.7 °C)   SpO2: 98%       Physical Exam  Vitals reviewed. Constitutional:       General: She is not in acute distress. Appearance: Normal appearance.  She is not ill-appearing or

## 2023-04-27 LAB
ALBUMIN SERPL-MCNC: 3.9 G/DL (ref 3.5–5)
ALBUMIN/GLOB SERPL: 1.3 (ref 0.4–1.6)
ALP SERPL-CCNC: 73 U/L (ref 50–136)
ALT SERPL-CCNC: 20 U/L (ref 12–65)
ANION GAP SERPL CALC-SCNC: 4 MMOL/L (ref 2–11)
AST SERPL-CCNC: 13 U/L (ref 15–37)
BILIRUB SERPL-MCNC: 0.5 MG/DL (ref 0.2–1.1)
BUN SERPL-MCNC: 11 MG/DL (ref 6–23)
CALCIUM SERPL-MCNC: 8.9 MG/DL (ref 8.3–10.4)
CHLORIDE SERPL-SCNC: 107 MMOL/L (ref 101–110)
CO2 SERPL-SCNC: 27 MMOL/L (ref 21–32)
CREAT SERPL-MCNC: 0.8 MG/DL (ref 0.6–1)
GLOBULIN SER CALC-MCNC: 3.1 G/DL (ref 2.8–4.5)
GLUCOSE SERPL-MCNC: 90 MG/DL (ref 65–100)
POTASSIUM SERPL-SCNC: 4.1 MMOL/L (ref 3.5–5.1)
PROT SERPL-MCNC: 7 G/DL (ref 6.3–8.2)
SODIUM SERPL-SCNC: 138 MMOL/L (ref 133–143)
TSH, 3RD GENERATION: 0.71 UIU/ML (ref 0.36–3.74)

## 2023-05-01 DIAGNOSIS — F41.9 ANXIETY: ICD-10-CM

## 2023-05-01 RX ORDER — BUSPIRONE HYDROCHLORIDE 5 MG/1
TABLET ORAL
Qty: 60 TABLET | Refills: 0 | OUTPATIENT
Start: 2023-05-01

## 2023-05-04 ENCOUNTER — APPOINTMENT (RX ONLY)
Dept: URBAN - METROPOLITAN AREA CLINIC 329 | Facility: CLINIC | Age: 23
Setting detail: DERMATOLOGY
End: 2023-05-04

## 2023-05-04 DIAGNOSIS — Z79.899 OTHER LONG TERM (CURRENT) DRUG THERAPY: ICD-10-CM

## 2023-05-04 DIAGNOSIS — L20.89 OTHER ATOPIC DERMATITIS: ICD-10-CM | Status: IMPROVED

## 2023-05-04 PROCEDURE — ? PATIENT SPECIFIC COUNSELING

## 2023-05-04 PROCEDURE — ? COUNSELING

## 2023-05-04 PROCEDURE — ? PRESCRIPTION SAMPLES PROVIDED

## 2023-05-04 PROCEDURE — 99213 OFFICE O/P EST LOW 20 MIN: CPT

## 2023-05-04 PROCEDURE — ? RINVOQ MONITORING

## 2023-05-04 PROCEDURE — ? PRESCRIPTION MEDICATION MANAGEMENT

## 2023-05-04 PROCEDURE — ? HIGH RISK MEDICATION MONITORING

## 2023-05-04 PROCEDURE — ? PHOTO-DOCUMENTATION

## 2023-05-04 ASSESSMENT — LOCATION SIMPLE DESCRIPTION DERM
LOCATION SIMPLE: RIGHT UPPER ARM
LOCATION SIMPLE: LEFT ARM
LOCATION SIMPLE: UPPER BACK

## 2023-05-04 ASSESSMENT — LOCATION ZONE DERM
LOCATION ZONE: ARM
LOCATION ZONE: TRUNK

## 2023-05-04 ASSESSMENT — LOCATION DETAILED DESCRIPTION DERM
LOCATION DETAILED: RIGHT ANTECUBITAL SKIN
LOCATION DETAILED: INFERIOR THORACIC SPINE
LOCATION DETAILED: LEFT LATERAL ANTECUBITAL SKIN

## 2023-05-04 ASSESSMENT — BSA ECZEMA: % BODY COVERED IN ECZEMA: 10

## 2023-05-04 ASSESSMENT — ITCH NUMERIC RATING SCALE: HOW SEVERE IS YOUR ITCHING?: 2

## 2023-05-04 NOTE — PROCEDURE: RINVOQ MONITORING
Detail Level: Zone
Length Of Therapy (Optional): 1 month
Add High Risk Medication Management Associated Diagnosis?: No

## 2023-05-04 NOTE — PROCEDURE: HIGH RISK MEDICATION MONITORING
Patient requesting a CPAP machine. Does the doctor want her to have an appointment or would you order a sleep study ? Preeti Duncan LPN ....................8/14/2020  1:53 PM     Clindamycin Counseling: I counseled the patient regarding use of clindamycin as an antibiotic for prophylactic and/or therapeutic purposes. Clindamycin is active against numerous classes of bacteria, including skin bacteria. Side effects may include nausea, diarrhea, gastrointestinal upset, rash, hives, yeast infections, and in rare cases, colitis.

## 2023-05-04 NOTE — PROCEDURE: PRESCRIPTION MEDICATION MANAGEMENT
Detail Level: Zone
Continue Regimen: Rinvoq 15mg QD\\nTriamcinolone ointment
Render In Strict Bullet Format?: No

## 2023-05-04 NOTE — PROCEDURE: HIGH RISK MEDICATION MONITORING
Recommendations (Free Text): Patient has tried cloderm, halobetasol, Vit D cream, Desonide, and clobetasol cream.\\nPatient states her knees do bother her sometimes.\\nPatient states she has had psoriasis for 12 years.\\nWill consider Taltz when Patient stops breast feeding. Detail Level: Zone Nsaids Pregnancy And Lactation Text: These medications are considered safe up to 30 weeks gestation. It is excreted in breast milk.

## 2023-05-04 NOTE — HPI: ECZEMA (PATIENT REPORTED)
Where Is Your Eczema Located?: Over body
List Prescription Topical Steroids You Are Currently Using (Separate Each Name With A Comma):: Triamcinolone ointment
Additional Comments (Use Complete Sentences): Patient started Rinvoq one month ago. Samples were given. Patient states better with treatment. Itching is verbalized at a score of 2

## 2023-05-04 NOTE — PROCEDURE: HIGH RISK MEDICATION MONITORING
Nesha Kee   1/16/2017 8:15 AM   Office Visit    Provider:  Sari Yanes PA-C   Department:  [facility]   Dept Phone:  [phone]                Your Full Care Plan              Today's Medication Changes          These changes are accurate as of: 1/16/17  8:50 AM.  If you have any questions, ask your nurse or doctor.               Medication(s)that have changed:     pravastatin 20 MG tablet   Commonly known as:  PRAVACHOL   Take 1 tablet by mouth Daily.   What changed:    - medication strength  - how much to take   Changed by:  Sari Yanes PA-C         Stop taking medication(s)listed here:     benzonatate 200 MG capsule   Commonly known as:  TESSALON   Stopped by:  Sari Yanes PA-C           ergocalciferol 97667 UNITS capsule   Commonly known as:  ERGOCALCIFEROL   Stopped by:  Sari Yanes PA-C           FLUoxetine 20 MG capsule   Commonly known as:  PROzac   Stopped by:  Sari Yanes PA-C           HYDROcod Polst-CPM Polst ER 10-8 MG/5ML ER suspension   Commonly known as:  TUSSIONEX PENNKINETIC ER   Stopped by:  Sari Yanes PA-C           omeprazole 40 MG capsule   Commonly known as:  priLOSEC   Stopped by:  Sari Yanes PA-C                Where to Get Your Medications      These medications were sent to [facility] [address] [place] [address] [phone]  - [phone] [address] [address]     Phone:  [phone]     pravastatin 20 MG tablet                  Your Updated Medication List          This list is accurate as of: 1/16/17  8:50 AM.  Always use your most recent med list.                mesalamine 1.2 G EC tablet   Commonly known as:  LIALDA       pravastatin 20 MG tablet   Commonly known as:  PRAVACHOL   Take 1 tablet by mouth Daily.       PROBIOTIC DAILY PO       ZYRTEC ALLERGY 10 MG capsule   Generic drug:  Cetirizine HCl                  You Were Diagnosed With        Codes Comments    Annual physical exam    -  Primary ICD-10-CM: Z00.00  ICD-9-CM: V70.0     Mixed hyperlipidemia     ICD-10-CM: E78.2  ICD-9-CM: 272.2     Vitamin D deficiency     ICD-10-CM: E55.9  ICD-9-CM: 268.9     Right hip pain     ICD-10-CM: M25.551  ICD-9-CM: 719.45       Instructions    Generic Hip Exercises  RANGE OF MOTION (ROM) AND STRETCHING EXERCISES   These exercises may help you when beginning to rehabilitate your injury. Doing them too aggressively can worsen your condition. Complete them slowly and gently. Your symptoms may resolve with or without further involvement from your physician, physical therapist or . While completing these exercises, remember:   · Restoring tissue flexibility helps normal motion to return to the joints. This allows healthier, less painful movement and activity.  · An effective stretch should be held for at least 30 seconds.  · A stretch should never be painful. You should only feel a gentle lengthening or release in the stretched tissue. If these stretches worsen your symptoms even when done gently, consult your physician, physical therapist or .  STRETCH - Hamstrings, Supine   · Lie on your back. Loop a belt or towel over the ball of your right / left foot.  · Straighten your right / left knee and slowly pull on the belt to raise your leg. Do not allow the right / left knee to bend. Keep your opposite leg flat on the floor.  · Raise the leg until you feel a gentle stretch behind your right / left knee or thigh. Hold this position for __________ seconds.  Repeat __________ times. Complete this stretch __________ times per day.   STRETCH - Hip Rotators   · Lie on your back on a firm surface. Grasp your right / left knee with your right / left hand and your ankle with your opposite hand.  · Keeping your hips and shoulders firmly planted, gently pull your right / left knee and rotate your lower leg toward your  "opposite shoulder until you feel a stretch in your buttocks.  · Hold this stretch for __________ seconds.  Repeat this stretch __________ times. Complete this stretch __________ times per day.  STRETCH - Hamstrings/Adductors, V-Sit   · Sit on the floor with your legs extended in a large \"V,\" keeping your knees straight.  · With your head and chest upright, bend at your waist reaching for your right foot to stretch your left adductors.  · You should feel a stretch in your left inner thigh. Hold for __________ seconds.  · Return to the upright position to relax your leg muscles.  · Continuing to keep your chest upright, bend straight forward at your waist to stretch your hamstrings.  · You should feel a stretch behind both of your thighs and/or knees. Hold for __________ seconds.  · Return to the upright position to relax your leg muscles.  · Repeat steps 2 through 4 for opposite leg.  Repeat __________ times. Complete this exercise __________ times per day.   STRETCHING - Hip Flexors, Lunge  · Half kneel with your right / left knee on the floor and your opposite knee bent and directly over your ankle.  · Keep good posture with your head over your shoulders. Tighten your buttocks to point your tailbone downward; this will prevent your back from arching too much.  · You should feel a gentle stretch in the front of your thigh and/or hip. If you do not feel any resistance, slightly slide your opposite foot forward and then slowly lunge forward so your knee once again lines up over your ankle. Be sure your tailbone remains pointed downward.  · Hold this stretch for __________ seconds.  Repeat __________ times. Complete this stretch __________ times per day.  STRENGTHENING EXERCISES  These exercises may help you when beginning to rehabilitate your injury. They may resolve your symptoms with or without further involvement from your physician, physical therapist or . While completing these exercises, " remember:   · Muscles can gain both the endurance and the strength needed for everyday activities through controlled exercises.  · Complete these exercises as instructed by your physician, physical therapist or . Progress the resistance and repetitions only as guided.  · You may experience muscle soreness or fatigue, but the pain or discomfort you are trying to eliminate should never worsen during these exercises. If this pain does worsen, stop and make certain you are following the directions exactly. If the pain is still present after adjustments, discontinue the exercise until you can discuss the trouble with your clinician.  STRENGTH - Hip Extensors, Bridge   · Lie on your back on a firm surface. Bend your knees and place your feet flat on the floor.  · Tighten your buttocks muscles and lift your bottom off the floor until your trunk is level with your thighs. You should feel the muscles in your buttocks and back of your thighs working. If you do not feel these muscles, slide your feet 1-2 inches further away from your buttocks.  · Hold this position for __________ seconds.  · Slowly lower your hips to the starting position and allow your buttock muscles relax completely before beginning the next repetition.  · If this exercise is too easy, you may cross your arms over your chest.  Repeat __________ times. Complete this exercise __________ times per day.   STRENGTH - Hip Abductors, Straight Leg Raises   Be aware of your form throughout the entire exercise so that you exercise the correct muscles. Sloppy form means that you are not strengthening the correct muscles.  · Lie on your side so that your head, shoulders, knee and hip line up. You may bend your lower knee to help maintain your balance. Your right / left leg should be on top.  · Roll your hips slightly forward, so that your hips are stacked directly over each other and your right / left knee is facing forward.  · Lift your top leg up 4-6  "inches, leading with your heel. Be sure that your foot does not drift forward or that your knee does not roll toward the ceiling.  · Hold this position for __________ seconds. You should feel the muscles in your outer hip lifting (you may not notice this until your leg begins to tire).  · Slowly lower your leg to the starting position. Allow the muscles to fully relax before beginning the next repetition.  Repeat __________ times. Complete this exercise __________ times per day.   STRENGTH - Hip Adductors, Straight Leg Raises   · Lie on your side so that your head, shoulders, knee and hip line up. You may place your upper foot in front to help maintain your balance. Your right / left leg should be on the bottom.  · Roll your hips slightly forward, so that your hips are stacked directly over each other and your right / left knee is facing forward.  · Tense the muscles in your inner thigh and lift your bottom leg 4-6 inches. Hold this position for __________ seconds.  · Slowly lower your leg to the starting position. Allow the muscles to fully relax before beginning the next repetition.  Repeat __________ times. Complete this exercise __________ times per day.   STRENGTH - Quadriceps, Straight Leg Raises   Quality counts! Watch for signs that the quadriceps muscle is working to insure you are strengthening the correct muscles and not \"cheating\" by substituting with healthier muscles.  · Lay on your back with your right / left leg extended and your opposite knee bent.  · Tense the muscles in the front of your right / left thigh. You should see either your knee cap slide up or increased dimpling just above the knee. Your thigh may even quiver.  · Tighten these muscles even more and raise your leg 4 to 6 inches off the floor. Hold for right / left seconds.  · Keeping these muscles tense, lower your leg.  · Relax the muscles slowly and completely in between each repetition.  Repeat __________ times. Complete this exercise " "__________ times per day.   STRENGTH - Hip Abductors, Standing  · Tie one end of a rubber exercise band/tubing to a secure surface (table, pole) and tie a loop at the other end.  · Place the loop around your right / left ankle. Keeping your ankle with the band directly opposite of the secured end, step away until there is tension in the tube/band.  · Hold onto a chair as needed for balance.  · Keeping your back upright, your shoulders over your hips, and your toes pointing forward, lift your right / left leg out to your side. Be sure to lift your leg with your hip muscles. Do not \"throw\" your leg or tip your body to lift your leg.  · Slowly and with control, return to the starting position.  Repeat exercise __________ times. Complete this exercise __________ times per day.   STRENGTH - Quadriceps, Squats  ·  a door frame so that your feet and knees are in line with the frame.  · Use your hands for balance, not support, on the frame.  · Slowly lower your weight, bending at the hips and knees. Keep your lower legs upright so that they are parallel with the door frame. Squat only within the range that does not increase your knee pain. Never let your hips drop below your knees.  · Slowly return upright, pushing with your legs, not pulling with your hands.     This information is not intended to replace advice given to you by your health care provider. Make sure you discuss any questions you have with your health care provider.     Document Released: 01/05/2007 Document Revised: 01/08/2016 Document Reviewed: 04/01/2010  Elsevier Interactive Patient Education ©2016 [employer]       Patient Instructions History      [employer] Signup     Our records indicate that you have an active [employer] account.    You can view your After Visit Summary by going to [employer] and logging in with your [employer] username and password.  If you don't have a [employer] username and password but a parent or " "guardian has access to your record, the parent or guardian should login with their own [employer] username and password and access your record to view the After Visit Summary.    If you have questions, you can email [email or web address] or call [phone] to talk to our [employer] staff.  Remember, [employer] is NOT to be used for urgent needs.  For medical emergencies, dial 911.               Other Info from Your Visit           Allergies     Iodinated Diagnostic Agents      Other      Iv contrast    Sulfa Antibiotics        Reason for Visit     Annual Exam Lab review      Vital Signs     Blood Pressure Pulse Temperature Respirations Height Weight    118/80 (BP Location: Right arm, Patient Position: Sitting, Cuff Size: Adult) 62 98 °F (36.7 °C) (Oral) 16 65\" (165.1 cm) 181 lb (82.1 kg)    Oxygen Saturation Body Mass Index Smoking Status             94% 30.12 kg/m2 Never Smoker         Problems and Diagnoses Noted     Annual physical exam    Hip pain    Mixed hyperlipidemia    Right hip pain    Vitamin D deficiency      " Cephalexin Pregnancy And Lactation Text: This medication is Pregnancy Category B and considered safe during pregnancy.  It is also excreted in breast milk but can be used safely for shorter doses.

## 2023-05-04 NOTE — PROCEDURE: HIGH RISK MEDICATION MONITORING
Xelmiguel ángelz Pregnancy And Lactation Text: This medication is Pregnancy Category D and is not considered safe during pregnancy.  The risk during breast feeding is also uncertain.

## 2023-05-16 ENCOUNTER — OFFICE VISIT (OUTPATIENT)
Dept: OBGYN CLINIC | Age: 23
End: 2023-05-16
Payer: COMMERCIAL

## 2023-05-16 VITALS
BODY MASS INDEX: 22.15 KG/M2 | DIASTOLIC BLOOD PRESSURE: 72 MMHG | SYSTOLIC BLOOD PRESSURE: 120 MMHG | WEIGHT: 125 LBS | HEIGHT: 63 IN

## 2023-05-16 DIAGNOSIS — N83.201 BILATERAL OVARIAN CYSTS: ICD-10-CM

## 2023-05-16 DIAGNOSIS — Z01.419 WOMEN'S ANNUAL ROUTINE GYNECOLOGICAL EXAMINATION: Primary | ICD-10-CM

## 2023-05-16 DIAGNOSIS — N94.6 DYSMENORRHEA: ICD-10-CM

## 2023-05-16 DIAGNOSIS — N94.10 DYSPAREUNIA IN FEMALE: ICD-10-CM

## 2023-05-16 DIAGNOSIS — N83.202 BILATERAL OVARIAN CYSTS: ICD-10-CM

## 2023-05-16 PROCEDURE — 99214 OFFICE O/P EST MOD 30 MIN: CPT | Performed by: OBSTETRICS & GYNECOLOGY

## 2023-05-16 PROCEDURE — 99395 PREV VISIT EST AGE 18-39: CPT | Performed by: OBSTETRICS & GYNECOLOGY

## 2023-05-16 RX ORDER — SODIUM FLUORIDE 6 MG/ML
PASTE, DENTIFRICE DENTAL
COMMUNITY
Start: 2023-05-03

## 2023-05-16 RX ORDER — NORETHINDRONE ACETATE AND ETHINYL ESTRADIOL 1MG-20(21)
1 KIT ORAL DAILY
Qty: 1 PACKET | Refills: 12 | Status: SHIPPED | OUTPATIENT
Start: 2023-05-16

## 2023-05-16 RX ORDER — UPADACITINIB 15 MG/1
TABLET, EXTENDED RELEASE ORAL
COMMUNITY
Start: 2023-04-17

## 2023-05-16 ASSESSMENT — PATIENT HEALTH QUESTIONNAIRE - PHQ9
4. FEELING TIRED OR HAVING LITTLE ENERGY: 1
SUM OF ALL RESPONSES TO PHQ9 QUESTIONS 1 & 2: 2
5. POOR APPETITE OR OVEREATING: 1
SUM OF ALL RESPONSES TO PHQ QUESTIONS 1-9: 7
10. IF YOU CHECKED OFF ANY PROBLEMS, HOW DIFFICULT HAVE THESE PROBLEMS MADE IT FOR YOU TO DO YOUR WORK, TAKE CARE OF THINGS AT HOME, OR GET ALONG WITH OTHER PEOPLE: 1
1. LITTLE INTEREST OR PLEASURE IN DOING THINGS: 1
2. FEELING DOWN, DEPRESSED OR HOPELESS: 1
8. MOVING OR SPEAKING SO SLOWLY THAT OTHER PEOPLE COULD HAVE NOTICED. OR THE OPPOSITE, BEING SO FIGETY OR RESTLESS THAT YOU HAVE BEEN MOVING AROUND A LOT MORE THAN USUAL: 0
SUM OF ALL RESPONSES TO PHQ QUESTIONS 1-9: 7
9. THOUGHTS THAT YOU WOULD BE BETTER OFF DEAD, OR OF HURTING YOURSELF: 0
6. FEELING BAD ABOUT YOURSELF - OR THAT YOU ARE A FAILURE OR HAVE LET YOURSELF OR YOUR FAMILY DOWN: 1
3. TROUBLE FALLING OR STAYING ASLEEP: 1
7. TROUBLE CONCENTRATING ON THINGS, SUCH AS READING THE NEWSPAPER OR WATCHING TELEVISION: 1

## 2023-05-16 NOTE — PATIENT INSTRUCTIONS
We will call you if anything is abnormal from your testing today. You can start your birth control pills on the first Sunday after you next period starts. You should try to take them around the same time each day. Remember the pills may cause irregular bleeding for the first couple of months when starting pills. You should start taking ibuprofen 400-600 mg 2-3 times a day with food about 1-2 days before your period even starts to help cut the bleeding and cramping by half. Follow up as needed or next year for your yearly female exam.  Thanks for coming to see us today and letting us take care of you!

## 2023-05-16 NOTE — PROGRESS NOTES
[address], [place] [address], [address]  [phone]    Emi Osorio MD, Adelso Gonzáles STALIN-BC  Brian Keating MD, FACOG      Assessment/Plan     Patient Active Problem List    Diagnosis Date Noted    Dysmenorrhea 12/02/2022     Priority: Medium     Overview Note:     noted       Assessment & Plan Note:     See A&P under dyspareunia  Encouraged pt to start taking ibuprofen about 1-2 days before menses begins to decrease bleeding and cramping by potentially 50%. Dyspareunia in female 12/02/2022     Priority: Medium     Overview Note:     noted       Assessment & Plan Note:     Pt did not get LoLoestrin due to cost  Pt willing to try slightly higher dose OCPs (20 mcg) - Rx sent      Bilateral ovarian cysts 10/27/2022     Priority: Medium     Overview Note:     noted       Assessment & Plan Note:     See A&P under dyspareunia      Mild intermittent asthma without complication 61/10/5859     Priority: Medium    Transaminitis 10/27/2022     Priority: Medium    Women's annual routine gynecological examination 05/16/2023     Overview Note:     Pap with reflex done 5/16/23       Assessment & Plan Note:     Exam as below  Encouraged annual exams with paps as indicated  Pt to F/U with PCP for all non-gyn health related issues       Postoperative wound infection 02/16/2022    Wound infection complicating hardware ([address] 06/04/2021      Problem List Items Addressed This Visit          Endocrine    Bilateral ovarian cysts     See A&P under dyspareunia            Genitourinary    Dysmenorrhea     See A&P under dyspareunia  Encouraged pt to start taking ibuprofen about 1-2 days before menses begins to decrease bleeding and cramping by potentially 50%.              Other    Dyspareunia in female     Pt did not get LoLoestrin due to cost  Pt willing to try slightly higher dose OCPs (20 mcg) - Rx sent         Women's annual routine gynecological examination - Primary     Exam as

## 2023-05-16 NOTE — ASSESSMENT & PLAN NOTE
See A&P under dyspareunia  Encouraged pt to start taking ibuprofen about 1-2 days before menses begins to decrease bleeding and cramping by potentially 50%.

## 2023-05-16 NOTE — ASSESSMENT & PLAN NOTE
Pt did not get LoLoestrin due to cost  Pt willing to try slightly higher dose OCPs (20 mcg) - Rx sent

## 2023-05-16 NOTE — PROGRESS NOTES
Patient here for AE. Patient states that her insurance did not cover the whole cost of the [facility] medication (lo loestrin). Copay $60.   Patient still having dysmenorrhea. LAST PAP:  never     LAST MAMMO:  never     LMP:  Patient's last menstrual period was 05/03/2023 (exact date).     BIRTH CONTROL:  none    TOBACCO USE:  No-vapes delta 8     FAMILY HISTORY OF:   Breast Cancer:  Yes   Ovarian Cancer:  No   Uterine Cancer:  No   Colon Cancer:  No    Vitals:    05/16/23 0936   BP: 120/72   Site: Left Upper Arm   Position: Sitting   Weight: 125 lb (56.7 kg)   Height: 5' 2.5\" (1.588 m)        Nicho Feliciano RN  05/16/23  9:47 AM

## 2023-05-22 LAB
C TRACH RRNA CVX QL NAA+PROBE: NEGATIVE
CYTOLOGIST CVX/VAG CYTO: NORMAL
CYTOLOGY CVX/VAG DOC THIN PREP: NORMAL
HPV REFLEX: NORMAL
Lab: NORMAL
N GONORRHOEA RRNA CVX QL NAA+PROBE: NEGATIVE
PATH REPORT.FINAL DX SPEC: NORMAL
STAT OF ADQ CVX/VAG CYTO-IMP: NORMAL
T VAGINALIS RRNA SPEC QL NAA+PROBE: NEGATIVE

## 2023-06-05 ENCOUNTER — TELEMEDICINE (OUTPATIENT)
Dept: FAMILY MEDICINE CLINIC | Facility: CLINIC | Age: 23
End: 2023-06-05
Payer: COMMERCIAL

## 2023-06-05 DIAGNOSIS — F90.9 ATTENTION DEFICIT HYPERACTIVITY DISORDER (ADHD), UNSPECIFIED ADHD TYPE: ICD-10-CM

## 2023-06-05 DIAGNOSIS — G43.909 MIGRAINE WITHOUT STATUS MIGRAINOSUS, NOT INTRACTABLE, UNSPECIFIED MIGRAINE TYPE: ICD-10-CM

## 2023-06-05 DIAGNOSIS — F41.9 ANXIETY: Primary | ICD-10-CM

## 2023-06-05 PROCEDURE — 99214 OFFICE O/P EST MOD 30 MIN: CPT | Performed by: FAMILY MEDICINE

## 2023-06-05 RX ORDER — BUSPIRONE HYDROCHLORIDE 10 MG/1
10 TABLET ORAL 2 TIMES DAILY
Qty: 60 TABLET | Refills: 2 | Status: SHIPPED | OUTPATIENT
Start: 2023-06-05 | End: 2023-09-03

## 2023-06-05 RX ORDER — RIZATRIPTAN BENZOATE 10 MG/1
TABLET ORAL
Qty: 18 TABLET | Refills: 1 | Status: SHIPPED | OUTPATIENT
Start: 2023-06-05

## 2023-06-05 ASSESSMENT — ENCOUNTER SYMPTOMS
SHORTNESS OF BREATH: 0
COUGH: 0

## 2023-06-05 NOTE — PROGRESS NOTES
Gurvinder King (: 2000) is a 25 y.o. female, established patient, here for evaluation of the following chief complaint(s): Anxiety       ASSESSMENT/PLAN:  1. Anxiety  -     busPIRone (BUSPAR) 10 MG tablet; Take 1 tablet by mouth 2 times daily, Disp-60 tablet, R-2Normal  2. Migraine without status migrainosus, not intractable, unspecified migraine type  -     rizatriptan (MAXALT) 10 MG tablet; Take 10 mg as needed for headache. if symptoms persist or return, may repeat dose after 2 hours. No more than two pills in 24 hour period. , Disp-18 tablet, R-1Normal  3. Attention deficit hyperactivity disorder (ADHD), unspecified ADHD type    Will increase buspar to 10mg twice daily, can increase to TID after 2 weeks if needed. Follow up in 4 weeks for recheck. Continue maxalt PRN. Discussed vyvanse - will come in to office for next visit to discuss and sign controlled substance agreement. Return in about 4 weeks (around 7/3/2023) for follow up anxiety. SUBJECTIVE/OBJECTIVE:  HPI  26 yo female with hx of anxiety who presents for follow up. Is taking buspar 5mg TID, has had some improvement. Needs refills on maxalt. Was on vyvanse in the past for ADHD, had some left over from  and was taking these past few days to help at work. She also plans on going back to school in the fall so would like to discuss getting back on it. Anxiety is a little better     Review of Systems   Constitutional:  Negative for activity change, appetite change, fever and unexpected weight change. Respiratory:  Negative for cough and shortness of breath. Cardiovascular:  Negative for chest pain and palpitations. Skin:  Negative for rash. Neurological:  Negative for dizziness and headaches. Psychiatric/Behavioral:  Negative for sleep disturbance. No flowsheet data found. Physical Exam  Vitals reviewed. Constitutional:       General: She is not in acute distress.      Appearance:

## 2023-06-06 ENCOUNTER — RX ONLY (OUTPATIENT)
Age: 23
Setting detail: RX ONLY
End: 2023-06-06

## 2023-06-06 RX ORDER — UPADACITINIB 15 MG/1
TABLET, EXTENDED RELEASE ORAL
Qty: 30 | Refills: 6 | Status: ERX | COMMUNITY
Start: 2023-06-06

## 2023-06-08 ENCOUNTER — RX ONLY (OUTPATIENT)
Age: 23
Setting detail: RX ONLY
End: 2023-06-08

## 2023-06-08 RX ORDER — UPADACITINIB 15 MG/1
TABLET, EXTENDED RELEASE ORAL
Qty: 30 | Refills: 6 | Status: ERX

## 2023-06-15 PROBLEM — Z01.419 WOMEN'S ANNUAL ROUTINE GYNECOLOGICAL EXAMINATION: Status: RESOLVED | Noted: 2023-05-16 | Resolved: 2023-06-15

## 2023-06-27 DIAGNOSIS — J45.20 MILD INTERMITTENT ASTHMA WITHOUT COMPLICATION: ICD-10-CM

## 2023-06-28 RX ORDER — FLUTICASONE PROPIONATE AND SALMETEROL 100; 50 UG/1; UG/1
POWDER RESPIRATORY (INHALATION)
Refills: 2 | OUTPATIENT
Start: 2023-06-28

## 2023-07-05 ENCOUNTER — APPOINTMENT (RX ONLY)
Dept: URBAN - METROPOLITAN AREA CLINIC 329 | Facility: CLINIC | Age: 23
Setting detail: DERMATOLOGY
End: 2023-07-05

## 2023-07-05 DIAGNOSIS — L20.89 OTHER ATOPIC DERMATITIS: ICD-10-CM | Status: INADEQUATELY CONTROLLED

## 2023-07-05 PROCEDURE — 99214 OFFICE O/P EST MOD 30 MIN: CPT

## 2023-07-05 PROCEDURE — ? COUNSELING

## 2023-07-05 PROCEDURE — ? PRESCRIPTION

## 2023-07-05 RX ORDER — UPADACITINIB 30 MG/1
TABLET, EXTENDED RELEASE ORAL
Qty: 30 | Refills: 2 | Status: ERX | COMMUNITY
Start: 2023-07-05

## 2023-07-05 RX ADMIN — UPADACITINIB: 30 TABLET, EXTENDED RELEASE ORAL at 00:00

## 2023-07-05 ASSESSMENT — LOCATION ZONE DERM
LOCATION ZONE: NOSE
LOCATION ZONE: ARM

## 2023-07-05 ASSESSMENT — ITCH NUMERIC RATING SCALE: HOW SEVERE IS YOUR ITCHING?: 7

## 2023-07-05 ASSESSMENT — LOCATION DETAILED DESCRIPTION DERM
LOCATION DETAILED: LEFT ANTERIOR SHOULDER
LOCATION DETAILED: NASAL SUPRATIP

## 2023-07-05 ASSESSMENT — BSA ECZEMA: % BODY COVERED IN ECZEMA: 37

## 2023-07-05 ASSESSMENT — LOCATION SIMPLE DESCRIPTION DERM
LOCATION SIMPLE: LEFT SHOULDER
LOCATION SIMPLE: NOSE

## 2023-07-27 DIAGNOSIS — F41.9 ANXIETY: ICD-10-CM

## 2023-07-27 DIAGNOSIS — J45.20 MILD INTERMITTENT ASTHMA WITHOUT COMPLICATION: ICD-10-CM

## 2023-07-27 RX ORDER — BUSPIRONE HYDROCHLORIDE 5 MG/1
TABLET ORAL
Qty: 90 TABLET | Refills: 3 | Status: SHIPPED | OUTPATIENT
Start: 2023-07-27

## 2023-07-27 RX ORDER — FLUTICASONE PROPIONATE AND SALMETEROL 100; 50 UG/1; UG/1
POWDER RESPIRATORY (INHALATION)
Qty: 3 EACH | Refills: 2 | Status: SHIPPED | OUTPATIENT
Start: 2023-07-27

## 2023-08-11 ENCOUNTER — HOSPITAL ENCOUNTER (EMERGENCY)
Age: 23
Discharge: HOME | End: 2023-08-11
Attending: EMERGENCY MEDICINE

## 2023-08-11 VITALS
DIASTOLIC BLOOD PRESSURE: 83 MMHG | RESPIRATION RATE: 20 BRPM | WEIGHT: 114 LBS | OXYGEN SATURATION: 96 % | TEMPERATURE: 98.4 F | BODY MASS INDEX: 20.2 KG/M2 | HEIGHT: 63 IN | SYSTOLIC BLOOD PRESSURE: 104 MMHG | HEART RATE: 100 BPM

## 2023-08-11 DIAGNOSIS — R10.13 ABDOMINAL PAIN, EPIGASTRIC: ICD-10-CM

## 2023-08-11 DIAGNOSIS — K62.5 RECTAL BLEEDING: Primary | ICD-10-CM

## 2023-08-11 LAB
ALBUMIN SERPL-MCNC: 3.8 G/DL (ref 3.5–5)
ALBUMIN/GLOB SERPL: 1.1 (ref 0.4–1.6)
ALP SERPL-CCNC: 59 U/L (ref 50–136)
ALT SERPL-CCNC: 21 U/L (ref 12–65)
ANION GAP SERPL CALC-SCNC: 3 MMOL/L (ref 2–11)
AST SERPL-CCNC: 12 U/L (ref 15–37)
BASOPHILS # BLD: 0 K/UL (ref 0–0.2)
BASOPHILS NFR BLD: 0 % (ref 0–2)
BILIRUB SERPL-MCNC: 0.5 MG/DL (ref 0.2–1.1)
BUN SERPL-MCNC: 10 MG/DL (ref 6–23)
CALCIUM SERPL-MCNC: 8.7 MG/DL (ref 8.3–10.4)
CHLORIDE SERPL-SCNC: 109 MMOL/L (ref 101–110)
CO2 SERPL-SCNC: 29 MMOL/L (ref 21–32)
CREAT SERPL-MCNC: 0.81 MG/DL (ref 0.6–1)
DIFFERENTIAL METHOD BLD: ABNORMAL
EOSINOPHIL # BLD: 0 K/UL (ref 0–0.8)
EOSINOPHIL NFR BLD: 1 % (ref 0.5–7.8)
ERYTHROCYTE [DISTWIDTH] IN BLOOD BY AUTOMATED COUNT: 12.6 % (ref 11.9–14.6)
GLOBULIN SER CALC-MCNC: 3.5 G/DL (ref 2.8–4.5)
GLUCOSE SERPL-MCNC: 97 MG/DL (ref 65–100)
HCG UR QL: NEGATIVE
HCT VFR BLD AUTO: 42.3 % (ref 35.8–46.3)
HGB BLD-MCNC: 14.3 G/DL (ref 11.7–15.4)
IMM GRANULOCYTES # BLD AUTO: 0 K/UL (ref 0–0.5)
IMM GRANULOCYTES NFR BLD AUTO: 0 % (ref 0–5)
LIPASE SERPL-CCNC: 168 U/L (ref 73–393)
LYMPHOCYTES # BLD: 3.4 K/UL (ref 0.5–4.6)
LYMPHOCYTES NFR BLD: 51 % (ref 13–44)
MCH RBC QN AUTO: 31.3 PG (ref 26.1–32.9)
MCHC RBC AUTO-ENTMCNC: 33.8 G/DL (ref 31.4–35)
MCV RBC AUTO: 92.6 FL (ref 82–102)
MONOCYTES # BLD: 0.5 K/UL (ref 0.1–1.3)
MONOCYTES NFR BLD: 7 % (ref 4–12)
NEUTS SEG # BLD: 2.8 K/UL (ref 1.7–8.2)
NEUTS SEG NFR BLD: 42 % (ref 43–78)
NRBC # BLD: 0 K/UL (ref 0–0.2)
PLATELET # BLD AUTO: 248 K/UL (ref 150–450)
PMV BLD AUTO: 10.4 FL (ref 9.4–12.3)
POTASSIUM SERPL-SCNC: 3.7 MMOL/L (ref 3.5–5.1)
PROT SERPL-MCNC: 7.3 G/DL (ref 6.3–8.2)
RBC # BLD AUTO: 4.57 M/UL (ref 4.05–5.2)
SODIUM SERPL-SCNC: 141 MMOL/L (ref 133–143)
WBC # BLD AUTO: 6.7 K/UL (ref 4.3–11.1)

## 2023-08-11 PROCEDURE — 96361 HYDRATE IV INFUSION ADD-ON: CPT

## 2023-08-11 PROCEDURE — 96360 HYDRATION IV INFUSION INIT: CPT

## 2023-08-11 PROCEDURE — 83690 ASSAY OF LIPASE: CPT

## 2023-08-11 PROCEDURE — 6370000000 HC RX 637 (ALT 250 FOR IP): Performed by: EMERGENCY MEDICINE

## 2023-08-11 PROCEDURE — 2580000003 HC RX 258: Performed by: EMERGENCY MEDICINE

## 2023-08-11 PROCEDURE — 80053 COMPREHEN METABOLIC PANEL: CPT

## 2023-08-11 PROCEDURE — 85025 COMPLETE CBC W/AUTO DIFF WBC: CPT

## 2023-08-11 PROCEDURE — 81025 URINE PREGNANCY TEST: CPT

## 2023-08-11 PROCEDURE — 99284 EMERGENCY DEPT VISIT MOD MDM: CPT

## 2023-08-11 RX ORDER — SUCRALFATE 1 G/1
1 TABLET ORAL 4 TIMES DAILY
Qty: 40 TABLET | Refills: 0 | Status: SHIPPED | OUTPATIENT
Start: 2023-08-11 | End: 2023-08-21

## 2023-08-11 RX ORDER — ONDANSETRON 4 MG/1
4 TABLET, FILM COATED ORAL 3 TIMES DAILY PRN
Qty: 12 TABLET | Refills: 0 | Status: SHIPPED | OUTPATIENT
Start: 2023-08-11

## 2023-08-11 RX ORDER — MAGNESIUM HYDROXIDE/ALUMINUM HYDROXICE/SIMETHICONE 120; 1200; 1200 MG/30ML; MG/30ML; MG/30ML
30 SUSPENSION ORAL
Status: COMPLETED | OUTPATIENT
Start: 2023-08-11 | End: 2023-08-11

## 2023-08-11 RX ORDER — LIDOCAINE HYDROCHLORIDE 20 MG/ML
15 SOLUTION OROPHARYNGEAL
Status: COMPLETED | OUTPATIENT
Start: 2023-08-11 | End: 2023-08-11

## 2023-08-11 RX ORDER — PANTOPRAZOLE SODIUM 40 MG/1
40 TABLET, DELAYED RELEASE ORAL
Qty: 14 TABLET | Refills: 0 | Status: SHIPPED | OUTPATIENT
Start: 2023-08-11 | End: 2023-08-25

## 2023-08-11 RX ORDER — 0.9 % SODIUM CHLORIDE 0.9 %
1000 INTRAVENOUS SOLUTION INTRAVENOUS ONCE
Status: COMPLETED | OUTPATIENT
Start: 2023-08-11 | End: 2023-08-11

## 2023-08-11 RX ADMIN — SODIUM CHLORIDE 1000 ML: 9 INJECTION, SOLUTION INTRAVENOUS at 21:29

## 2023-08-11 RX ADMIN — ALUMINUM HYDROXIDE, MAGNESIUM HYDROXIDE, DIMETHICONE 30 ML: 200; 200; 20 LIQUID ORAL at 23:10

## 2023-08-11 RX ADMIN — LIDOCAINE HYDROCHLORIDE 15 ML: 20 SOLUTION ORAL; TOPICAL at 23:10

## 2023-08-11 ASSESSMENT — ENCOUNTER SYMPTOMS
ABDOMINAL PAIN: 1
BACK PAIN: 0
DIARRHEA: 0
CONSTIPATION: 0
SORE THROAT: 0
NAUSEA: 1
SHORTNESS OF BREATH: 0
VOMITING: 1
COUGH: 0

## 2023-08-11 ASSESSMENT — LIFESTYLE VARIABLES: HOW MANY STANDARD DRINKS CONTAINING ALCOHOL DO YOU HAVE ON A TYPICAL DAY: PATIENT DOES NOT DRINK

## 2023-08-11 ASSESSMENT — PAIN DESCRIPTION - ORIENTATION: ORIENTATION: LOWER

## 2023-08-11 ASSESSMENT — PAIN DESCRIPTION - FREQUENCY: FREQUENCY: CONTINUOUS

## 2023-08-11 ASSESSMENT — PAIN DESCRIPTION - PAIN TYPE: TYPE: ACUTE PAIN

## 2023-08-11 ASSESSMENT — PAIN - FUNCTIONAL ASSESSMENT: PAIN_FUNCTIONAL_ASSESSMENT: 0-10

## 2023-08-11 ASSESSMENT — PAIN SCALES - GENERAL: PAINLEVEL_OUTOF10: 6

## 2023-08-11 ASSESSMENT — PAIN DESCRIPTION - DESCRIPTORS: DESCRIPTORS: PRESSURE;CRAMPING

## 2023-08-11 ASSESSMENT — PAIN DESCRIPTION - LOCATION: LOCATION: ABDOMEN

## 2023-08-12 NOTE — ED NOTES
PT reports midline abdominal pain with nausea and vomiting x7 days. Denies any blood in the vomit. She states she has not been able to replenish with fluids or food due to vomiting and feeling so nauseous. Reports intermittent diarrhea with formed stool. States that stool is black in color. PT feels more weak and tired than baseline. Reports headache x7 days intermittently.         Niki Robertson RN  08/11/23 2032

## 2023-08-12 NOTE — ED NOTES
Hemoccult- Positive   Med Student/intern performed   Provider- Micaela Brooks MD notified      Chemo Hancock RN  08/11/23 2969

## 2023-08-12 NOTE — ED PROVIDER NOTES
normal. There is no distension. Palpations: Abdomen is soft. Tenderness: There is no abdominal tenderness. There is no right CVA tenderness, left CVA tenderness, guarding or rebound. Genitourinary:     Rectum: Normal. Guaiac result positive. No tenderness or external hemorrhoid. 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 Cervical back: Normal range of motion and neck supple. Skin:     General: Skin is warm and dry. Neurological:      General: No focal deficit present. Mental Status: She is alert and oriented to person, place, and time.         Orders Placed This Encounter   Procedures    CBC with Diff    CMP    Lipase    Ambulatory referral to Gastroenterology    Diet NPO    POCT Urine Dipstick    POC Pregnancy Urine Qual    POC Pregnancy Urine Qual    Saline lock IV       Procedures    Results Include:    Recent Results (from the past 24 hour(s))   POC Pregnancy Urine Qual    Collection Time: 08/11/23  6:46 PM   Result Value Ref Range    Preg Test, Ur Negative NEG     CBC with Diff    Collection Time: 08/11/23  7:14 PM   Result Value Ref Range    WBC 6.7 4.3 - 11.1 K/uL    RBC 4.57 4.05 - 5.2 M/uL    Hemoglobin 14.3 11.7 - 15.4 g/dL    Hematocrit 42.3 35.8 - 46.3 %    MCV 92.6 82.0 - 102.0 FL    MCH 31.3 26.1 - 32.9 PG    MCHC 33.8 31.4 - 35.0 g/dL    RDW 12.6 11.9 - 14.6 %    Platelets 176 145 - 380 K/uL    MPV 10.4 9.4 - 12.3 FL    nRBC 0.00 0.0 - 0.2 K/uL    Differential Type AUTOMATED      Neutrophils % 42 (L) 43 - 78 %    Lymphocytes % 51 (H) 13 - 44 %    Monocytes % 7 4.0 - 12.0 %    Eosinophils % 1 0.5 - 7.8 %    Basophils % 0 0.0 - 2.0 %    Immature Granulocytes 0 0.0 - 5.0 %    Neutrophils Absolute 2.8 1.7 - 8.2 K/UL    Lymphocytes Absolute 3.4 0.5 - 4.6 K/UL    Monocytes Absolute 0.5 0.1 - 1.3 K/UL    Eosinophils Absolute 0.0 0.0 - 0.8 K/UL    Basophils Absolute 0.0 0.0 - 0.2 K/UL    Absolute Immature

## 2023-09-06 ENCOUNTER — OFFICE VISIT (OUTPATIENT)
Dept: GASTROENTEROLOGY | Age: 23
End: 2023-09-06
Payer: COMMERCIAL

## 2023-09-06 VITALS
SYSTOLIC BLOOD PRESSURE: 106 MMHG | DIASTOLIC BLOOD PRESSURE: 80 MMHG | BODY MASS INDEX: 21.79 KG/M2 | TEMPERATURE: 97.4 F | HEIGHT: 63 IN | WEIGHT: 123 LBS | HEART RATE: 97 BPM | RESPIRATION RATE: 16 BRPM | OXYGEN SATURATION: 98 %

## 2023-09-06 DIAGNOSIS — R10.13 EPIGASTRIC PAIN: ICD-10-CM

## 2023-09-06 DIAGNOSIS — R19.4 BOWEL HABIT CHANGES: Primary | ICD-10-CM

## 2023-09-06 DIAGNOSIS — K20.0 ESOPHAGITIS, EOSINOPHILIC: ICD-10-CM

## 2023-09-06 DIAGNOSIS — K92.1 MELENA: ICD-10-CM

## 2023-09-06 PROCEDURE — 99204 OFFICE O/P NEW MOD 45 MIN: CPT | Performed by: PHYSICIAN ASSISTANT

## 2023-09-06 RX ORDER — POLYETHYLENE GLYCOL 3350 17 G/17G
238 POWDER, FOR SOLUTION ORAL ONCE
Qty: 238 G | Refills: 0 | Status: SHIPPED | OUTPATIENT
Start: 2023-09-06 | End: 2023-09-06

## 2023-09-06 RX ORDER — BISACODYL 5 MG/1
5 TABLET, DELAYED RELEASE ORAL SEE ADMIN INSTRUCTIONS
Qty: 4 TABLET | Refills: 0 | Status: SHIPPED | OUTPATIENT
Start: 2023-09-06

## 2023-09-06 NOTE — PATIENT INSTRUCTIONS
For constipation:  Take Miralax 1 cap daily for constipation and titrate up or down as needed until having regular daily bowel movements. Increase daily fiber intake to 20-30 grams daily either by dietary intake or an over-the-counter supplement such as Citrucel. Limit alcohol and fatty food intake. Drink at least 80 oz water daily or more as needed to maintain adequate hydration. Exercise regularly and consider weight loss if appropriate based on your current weight. Avoid straining or lingering on the toilet longer than necessary. Review your medication list and discuss with your PCP whether any of these medications may exacerbate constipation. For reflux:   Eat smaller and more frequent meals. Avoid lying down for 3 hours after eating. Elevate the head of the bed by 6 inches. Avoid wearing tight-fitting clothing. Stop smoking and avoid alcohol. Avoid NSAID medications (Aspirin, Excedrin, Aleve, Ibuprofen, Goody Powder, Toradol, Mobic, Voltaren, etc). Consider weight loss to get to a healthy weight, which is often critical to eliminating symptoms of reflux. Avoid foods and acid-containing beverages that can exacerbate the symptoms of reflux.  This includes:     -Caffeine  -Chocolate  -Peppermint  -Alcohol (especially red wine)  -Carbonated beverages  -Citrus fruits  -Tomato-based products  -Vinegar  -Spicy and greasy foods

## 2023-09-06 NOTE — PROGRESS NOTES
Kylie (:  2000) is a 25 y.o. female new patient referred to our office for evaluation of the following chief complaint(s):  New Patient, Abdominal Pain, and Stool Color Change           ASSESSMENT/PLAN:  1. Bowel habit changes  2. Melena  3. Epigastric pain  4. Esophagitis, eosinophilic       Schedule EGD and colonoscopy. Hx EoE, last EGD 2020 was normal. Denies GERD symptoms or dysphagia but follow-up pathology as she has concerns for ongoing active disease. Main concern is for NSAID-related PUD as she was using Excedrin and Ibuprofen over a several week period leading up to ED visit . Counseled patient and provided written recommendations for diet and lifestyle modifications for GERD. Avoid tobacco, alcohol, NSAIDs. Suspect stool changes are related to some baseline constipation; discussed basic constipation care. Recommend adding fiber supplement and daily Miralax use. Subjective   SUBJECTIVE/OBJECTIVE  Kylie is a 25y.o. year old female with PMH is pertinent for WPW, EoE, ADHD, anxiety, depression, cannabis abuse, migraine headaches, eczema, asthma, suspected MAUREEN. Surgical history is pertinent for appendectomy and cholecystectomy. Med list is pertinent for Protonix 40 mg QD and Carafate 1 g QID, both . Patient was referred to our office for evaluation of stool changes. Most recent labs from  reviewed including CBC, CMP, lipase and these were unremarkable. TSH in April was WNL. CT abdomen/pelvis 10/19/22 notable for 5 cm cystic mass in the right adnexa, suspicious for hemorrhagic ovarian cyst. Pelvic ultrasound 22 notable for bilateral ovarian cyst, smaller on the right at 1.3 cm than prior imaging. ED visit from  reviewed; patient presented with a 1 week history epigastric pain, nausea, vomiting, and dark stools likely related to gastritis peptic ulcer disease. Stool was heme-positive. Prior GI records reviewed from [facility].  OV

## 2023-09-07 ENCOUNTER — PREP FOR PROCEDURE (OUTPATIENT)
Dept: GASTROENTEROLOGY | Age: 23
End: 2023-09-07

## 2023-09-07 PROBLEM — R10.13 EPIGASTRIC ABDOMINAL PAIN: Status: ACTIVE | Noted: 2023-09-07

## 2023-09-07 PROBLEM — K92.1 BLOOD IN STOOL: Status: ACTIVE | Noted: 2023-09-07

## 2023-09-07 PROBLEM — R19.4 CHANGE IN BOWEL HABITS: Status: ACTIVE | Noted: 2023-09-07

## 2023-09-11 RX ORDER — SODIUM CHLORIDE 0.9 % (FLUSH) 0.9 %
5-40 SYRINGE (ML) INJECTION EVERY 12 HOURS SCHEDULED
OUTPATIENT
Start: 2023-09-11

## 2023-09-11 RX ORDER — SODIUM CHLORIDE 0.9 % (FLUSH) 0.9 %
5-40 SYRINGE (ML) INJECTION PRN
OUTPATIENT
Start: 2023-09-11

## 2023-09-11 RX ORDER — SODIUM CHLORIDE 9 MG/ML
25 INJECTION, SOLUTION INTRAVENOUS PRN
OUTPATIENT
Start: 2023-09-11

## 2023-10-04 ENCOUNTER — APPOINTMENT (RX ONLY)
Dept: URBAN - METROPOLITAN AREA CLINIC 329 | Facility: CLINIC | Age: 23
Setting detail: DERMATOLOGY
End: 2023-10-04

## 2023-10-04 DIAGNOSIS — L20.89 OTHER ATOPIC DERMATITIS: ICD-10-CM | Status: INADEQUATELY CONTROLLED

## 2023-10-04 DIAGNOSIS — L70.0 ACNE VULGARIS: ICD-10-CM

## 2023-10-04 PROCEDURE — ? FULL BODY SKIN EXAM - DECLINED

## 2023-10-04 PROCEDURE — ? PRESCRIPTION

## 2023-10-04 PROCEDURE — 99214 OFFICE O/P EST MOD 30 MIN: CPT

## 2023-10-04 PROCEDURE — ? COUNSELING

## 2023-10-04 PROCEDURE — ? PRESCRIPTION MEDICATION MANAGEMENT

## 2023-10-04 RX ORDER — UPADACITINIB 30 MG/1
TABLET, EXTENDED RELEASE ORAL
Qty: 30 | Refills: 2 | Status: ERX

## 2023-10-04 RX ORDER — MINOCYCLINE HYDROCHLORIDE 100 MG/1
CAPSULE ORAL
Qty: 30 | Refills: 3 | Status: ERX | COMMUNITY
Start: 2023-10-04

## 2023-10-04 RX ADMIN — MINOCYCLINE HYDROCHLORIDE: 100 CAPSULE ORAL at 00:00

## 2023-10-04 ASSESSMENT — LOCATION SIMPLE DESCRIPTION DERM
LOCATION SIMPLE: NOSE
LOCATION SIMPLE: LEFT SHOULDER
LOCATION SIMPLE: RIGHT NOSE
LOCATION SIMPLE: UPPER BACK
LOCATION SIMPLE: LEFT CHEEK
LOCATION SIMPLE: LEFT UPPER BACK
LOCATION SIMPLE: RIGHT UPPER BACK

## 2023-10-04 ASSESSMENT — LOCATION DETAILED DESCRIPTION DERM
LOCATION DETAILED: LEFT ANTERIOR SHOULDER
LOCATION DETAILED: RIGHT INFERIOR UPPER BACK
LOCATION DETAILED: LEFT SUPERIOR MEDIAL UPPER BACK
LOCATION DETAILED: RIGHT NASAL SIDEWALL
LOCATION DETAILED: INFERIOR THORACIC SPINE
LOCATION DETAILED: NASAL DORSUM
LOCATION DETAILED: NASAL SUPRATIP
LOCATION DETAILED: LEFT CENTRAL MALAR CHEEK
LOCATION DETAILED: LEFT INFERIOR MEDIAL UPPER BACK
LOCATION DETAILED: LEFT INFERIOR MEDIAL MALAR CHEEK

## 2023-10-04 ASSESSMENT — BSA ECZEMA: % BODY COVERED IN ECZEMA: 13

## 2023-10-04 ASSESSMENT — ITCH NUMERIC RATING SCALE: HOW SEVERE IS YOUR ITCHING?: 8

## 2023-10-04 ASSESSMENT — LOCATION ZONE DERM
LOCATION ZONE: TRUNK
LOCATION ZONE: NOSE
LOCATION ZONE: ARM
LOCATION ZONE: FACE

## 2023-10-04 NOTE — PROCEDURE: PRESCRIPTION MEDICATION MANAGEMENT
Plan: Start back on Rinvoq had to stop due to insurance
Detail Level: Zone
Render In Strict Bullet Format?: No
Continue Regimen: Minocycline 100mg at flare\\nBenzoyl peroxide cleanser

## 2023-10-04 NOTE — HPI: RASH (ATOPIC DERMATITIS)
How Severe Is Your Atopic Dermatitis?: severe
Is This A New Presentation, Or A Follow-Up?: Follow Up Atopic Dermatitis
Additional History: Patient lost insurance had to stop Rinvoq.

## 2023-11-13 ENCOUNTER — ANESTHESIA EVENT (OUTPATIENT)
Dept: ENDOSCOPY | Age: 23
End: 2023-11-13
Payer: COMMERCIAL

## 2023-11-13 ASSESSMENT — ENCOUNTER SYMPTOMS
ABDOMINAL PAIN: 1
CONSTIPATION: 1
BLOOD IN STOOL: 1

## 2023-11-13 ASSESSMENT — LIFESTYLE VARIABLES: SMOKING_STATUS: 1

## 2023-11-13 NOTE — H&P
Inhale 2 puffs into the lungs every 4 hours as needed for Wheezing or Shortness of Breath 18 g 2    triamcinolone (KENALOG) 0.1 % ointment Apply topically daily as needed         Family History   Problem Relation Age of Onset    Hypertension Mother     Diabetes type 2  Mother     Hypertension Father        No follow-ups on file. An electronic signature was used to authenticate this note.     --Arnav Renee MD

## 2023-11-13 NOTE — ANESTHESIA PRE PROCEDURE
GI/Hepatic/Renal ROS            Endo/Other: Negative Endo/Other ROS                    Abdominal:             Vascular: Other Findings:           Anesthesia Plan      TIVA     ASA 3       Induction: intravenous. Anesthetic plan and risks discussed with patient. Plan discussed with CRNA.                     Rene Donahue MD   11/13/2023

## 2023-11-14 ENCOUNTER — ANESTHESIA (OUTPATIENT)
Dept: ENDOSCOPY | Age: 23
End: 2023-11-14
Payer: COMMERCIAL

## 2023-11-14 ENCOUNTER — HOSPITAL ENCOUNTER (OUTPATIENT)
Age: 23
Setting detail: OUTPATIENT SURGERY
Discharge: HOME | End: 2023-11-14
Attending: INTERNAL MEDICINE | Admitting: INTERNAL MEDICINE
Payer: COMMERCIAL

## 2023-11-14 VITALS
OXYGEN SATURATION: 100 % | BODY MASS INDEX: 20.68 KG/M2 | SYSTOLIC BLOOD PRESSURE: 106 MMHG | WEIGHT: 116.7 LBS | HEART RATE: 55 BPM | RESPIRATION RATE: 16 BRPM | DIASTOLIC BLOOD PRESSURE: 53 MMHG | HEIGHT: 63 IN | TEMPERATURE: 97.8 F

## 2023-11-14 DIAGNOSIS — R19.4 CHANGE IN BOWEL HABITS: ICD-10-CM

## 2023-11-14 DIAGNOSIS — R10.13 EPIGASTRIC ABDOMINAL PAIN: ICD-10-CM

## 2023-11-14 DIAGNOSIS — K92.1 BLOOD IN STOOL: ICD-10-CM

## 2023-11-14 LAB — HCG UR QL: NEGATIVE

## 2023-11-14 PROCEDURE — 7100000010 HC PHASE II RECOVERY - FIRST 15 MIN: Performed by: INTERNAL MEDICINE

## 2023-11-14 PROCEDURE — 88312 SPECIAL STAINS GROUP 1: CPT

## 2023-11-14 PROCEDURE — 7100000011 HC PHASE II RECOVERY - ADDTL 15 MIN: Performed by: INTERNAL MEDICINE

## 2023-11-14 PROCEDURE — 3609010300 HC COLONOSCOPY W/BIOPSY SINGLE/MULTIPLE: Performed by: INTERNAL MEDICINE

## 2023-11-14 PROCEDURE — 6360000002 HC RX W HCPCS: Performed by: REGISTERED NURSE

## 2023-11-14 PROCEDURE — 2500000003 HC RX 250 WO HCPCS: Performed by: REGISTERED NURSE

## 2023-11-14 PROCEDURE — 3700000001 HC ADD 15 MINUTES (ANESTHESIA): Performed by: INTERNAL MEDICINE

## 2023-11-14 PROCEDURE — 2580000003 HC RX 258: Performed by: REGISTERED NURSE

## 2023-11-14 PROCEDURE — 3700000000 HC ANESTHESIA ATTENDED CARE: Performed by: INTERNAL MEDICINE

## 2023-11-14 PROCEDURE — 88305 TISSUE EXAM BY PATHOLOGIST: CPT

## 2023-11-14 PROCEDURE — 2709999900 HC NON-CHARGEABLE SUPPLY: Performed by: INTERNAL MEDICINE

## 2023-11-14 PROCEDURE — 3609012400 HC EGD TRANSORAL BIOPSY SINGLE/MULTIPLE: Performed by: INTERNAL MEDICINE

## 2023-11-14 PROCEDURE — 81025 URINE PREGNANCY TEST: CPT

## 2023-11-14 RX ORDER — SODIUM CHLORIDE 0.9 % (FLUSH) 0.9 %
5-40 SYRINGE (ML) INJECTION EVERY 12 HOURS SCHEDULED
Status: DISCONTINUED | OUTPATIENT
Start: 2023-11-14 | End: 2023-11-14 | Stop reason: HOSPADM

## 2023-11-14 RX ORDER — SODIUM CHLORIDE 9 MG/ML
25 INJECTION, SOLUTION INTRAVENOUS PRN
Status: DISCONTINUED | OUTPATIENT
Start: 2023-11-14 | End: 2023-11-14 | Stop reason: HOSPADM

## 2023-11-14 RX ORDER — SODIUM CHLORIDE, SODIUM LACTATE, POTASSIUM CHLORIDE, CALCIUM CHLORIDE 600; 310; 30; 20 MG/100ML; MG/100ML; MG/100ML; MG/100ML
INJECTION, SOLUTION INTRAVENOUS CONTINUOUS PRN
Status: DISCONTINUED | OUTPATIENT
Start: 2023-11-14 | End: 2023-11-14 | Stop reason: SDUPTHER

## 2023-11-14 RX ORDER — LIDOCAINE HYDROCHLORIDE 20 MG/ML
INJECTION, SOLUTION EPIDURAL; INFILTRATION; INTRACAUDAL; PERINEURAL PRN
Status: DISCONTINUED | OUTPATIENT
Start: 2023-11-14 | End: 2023-11-14 | Stop reason: SDUPTHER

## 2023-11-14 RX ORDER — PROPOFOL 10 MG/ML
INJECTION, EMULSION INTRAVENOUS PRN
Status: DISCONTINUED | OUTPATIENT
Start: 2023-11-14 | End: 2023-11-14 | Stop reason: SDUPTHER

## 2023-11-14 RX ORDER — SODIUM CHLORIDE 0.9 % (FLUSH) 0.9 %
5-40 SYRINGE (ML) INJECTION PRN
Status: DISCONTINUED | OUTPATIENT
Start: 2023-11-14 | End: 2023-11-14 | Stop reason: HOSPADM

## 2023-11-14 RX ADMIN — SODIUM CHLORIDE, SODIUM LACTATE, POTASSIUM CHLORIDE, AND CALCIUM CHLORIDE: 600; 310; 30; 20 INJECTION, SOLUTION INTRAVENOUS at 08:53

## 2023-11-14 RX ADMIN — PROPOFOL 90 MG: 10 INJECTION, EMULSION INTRAVENOUS at 08:55

## 2023-11-14 RX ADMIN — PROPOFOL 30 MG: 10 INJECTION, EMULSION INTRAVENOUS at 08:56

## 2023-11-14 RX ADMIN — PHENYLEPHRINE HYDROCHLORIDE 100 MCG: 0.1 INJECTION, SOLUTION INTRAVENOUS at 09:04

## 2023-11-14 RX ADMIN — LIDOCAINE HYDROCHLORIDE 100 MG: 20 INJECTION, SOLUTION EPIDURAL; INFILTRATION; INTRACAUDAL; PERINEURAL at 08:55

## 2023-11-14 RX ADMIN — PROPOFOL 300 MCG/KG/MIN: 10 INJECTION, EMULSION INTRAVENOUS at 08:57

## 2023-11-14 ASSESSMENT — PAIN - FUNCTIONAL ASSESSMENT: PAIN_FUNCTIONAL_ASSESSMENT: 0-10

## 2023-11-14 NOTE — DISCHARGE INSTRUCTIONS
Gastrointestinal Esophagogastroduodenoscopy (EGD) - Upper Exam Discharge Instructions    1. Call Dr. Juan Miguel Fajardo at [phone] for any problems or questions. 2. Contact the doctor's office for follow up appointment as directed. 3. Medication may cause drowsiness for several hours, therefore:  Do not drive or operate machinery for remainder of the day. No alcohol today. Do not make any important or legal decisions for 24 hours. Do not sign any legal documents for 24 hours. 5. Ordinarily, you may resume regular diet and activity after exam unless otherwise specified by your physician. 6. For mild soreness in your throat you may use Cepacol throat lozenges or warm salt-water gargles as needed. Gastrointestinal Colonoscopy/Flexible Sigmoidoscopy - Lower Exam Discharge Instructions      Because of air put into your colon during exam, you may experience some abdominal distension, relieved by the passage of gas, for several hours. Contact your physician if you have any of the following:  Excessive amount of bleeding - large amount when having a bowel movement. Occasional specks of blood with bowel movement would not be unusual.  Severe abdominal pain  Fever or Chills        Any additional instructions:  Await pathology results. Contact office next week to go over results.

## 2023-11-14 NOTE — PROCEDURES
Endoscopy Note          Operative Report    Patient: Demetrice Fox MRN: [id number]      YOB: 2000  Age: 25 y.o. Sex: female            Indications:   Epigastric abdominal pain. Reflux disease. Preoperative Evaluation: The patient was evaluated prior to the procedure in the GI lab admission area, the patient ASA was recorded . Consent was obtained from the patient with the risk of perforation bleeding and aspiration. Anesthesia: MARIANNE-per anesthesia    Complication: None; patient tolerated the procedure well. Estimated blood loss: insignificant    Procedure: The patient was sedated into the left lateral decubitus position. Scope was advanced from the mouth to the third portion of duodenum. Scope was withdrawn to the stomach and retroflexed view was performed. Esophageal examination was performed. Findings: Normal upper and mid and distal esophageal Koza open GE junction. No sign of esophageal stricture or ulcers ,mucosal furrows ,edema or esophageal rings to suspect eosinophilic esophagitis. Multiple biopsies were taken from the esophagus. Erosive generalized ascites. Biopsy from antrum and gastric body was taken. Open pylorus.   Normal descending duodenum mucosa biopsy was taken to rule out possibility of celiac disease    Postoperative Diagnosis: Erosive gastritis    41480 Esophagogastroduodenoscopy, Flexible, transoral; biopsy; single or multiple      Recommendations: Await Pathology and Follow up in office as scheduled      Signed By:  Susan Garcia MD     November 14, 2023

## 2023-11-14 NOTE — PROCEDURES
Operative Report    Patient: Suzanne Allen MRN: [id number]      YOB: 2000  Age: 25 y.o. Sex: female            Indications: Change in bowel [email or web address]     Preoperative Evaluation: The patient was evaluated prior to the procedure in the GI lab admission area, the patient ASA was recorded . Consent was obtained from the patient with the risk of perforation bleeding and aspiration. Anesthesia: MARIANNE-per anesthesia    Complications: None; patient tolerated the procedure well. EBL -insignificant      Procedure: The patient was sedated in the left lateral decubitus position. Scope was advanced from the rectum to the ileum  Evaluation was performed to the cecum twice. The scope was withdrawn to the rectum, retroflexed view was performed. The rectal exam was normal.  Preparation was good Copake score of 3/3/3:9 . Findings:   Exam to the ileum. Normal ileal mucosa that was biopsied no sign of ileitis. Normal colon mucosa throughout the exam with normal vascular pattern no sign of ulceration colitis stricture or obstruction. Random biopsies taken from the right and left colon. Secondary to patient age withdrawal time for screening purposes was not recorded. Postoperative Diagnosis: Irritable bowel syndrome    42730 Colonoscopy, Flexible; with biopsy, single or multiple      Recommendations:   - Await pathology. - Follow up with me.       Signed By:  Raphael Escobar MD     November 14, 2023

## 2023-11-14 NOTE — ANESTHESIA POSTPROCEDURE EVALUATION
Department of Anesthesiology  Postprocedure Note    Patient: Suzanne Allen  MRN: [id number]  YOB: 2000  Date of evaluation: 11/14/2023      Procedure Summary     Date: 11/14/23 Room / [place] [facility] / [facility]    Anesthesia Start: 2821 Anesthesia Stop: 0915    Procedures:       COLONOSCOPY WITH BIOPSY (Lower GI Region)      EGD BIOPSY (Upper GI Region) Diagnosis:       Change in bowel habits      Blood in stool      Epigastric abdominal pain      (Change in bowel habits [R19.4])      (Blood in stool [K92.1])      (Epigastric abdominal pain [R10.13])    Surgeons: Raphael Escobar MD Responsible Provider: Kong Villalobos MD    Anesthesia Type: TIVA ASA Status: 3          Anesthesia Type: No value filed.     Chente Phase I:      Chente Phase II: Chente Score: 10      Anesthesia Post Evaluation    Patient location during evaluation: PACU  Patient participation: complete - patient participated  Level of consciousness: awake  Airway patency: patent  Nausea & Vomiting: no nausea and no vomiting  Complications: no  Cardiovascular status: blood pressure returned to baseline  Respiratory status: acceptable  Hydration status: euvolemic  Comments: --------------------            11/14/23               0940     --------------------   BP:     (!) 106/53   Pulse:      55       Resp:       16       Temp:                SpO2:      100%     --------------------    Pain management: adequate

## 2023-11-16 ENCOUNTER — TELEPHONE (OUTPATIENT)
Dept: GASTROENTEROLOGY | Age: 23
End: 2023-11-16

## 2023-11-16 NOTE — TELEPHONE ENCOUNTER
I called pt to schedule an post procedure follow up appointment with Minal Palacios, no answer and LVM to call office to get scheduled

## 2023-11-20 ENCOUNTER — OFFICE VISIT (OUTPATIENT)
Dept: GASTROENTEROLOGY | Age: 23
End: 2023-11-20
Payer: COMMERCIAL

## 2023-11-20 VITALS
HEART RATE: 83 BPM | HEIGHT: 63 IN | TEMPERATURE: 98 F | DIASTOLIC BLOOD PRESSURE: 64 MMHG | RESPIRATION RATE: 18 BRPM | BODY MASS INDEX: 20.7 KG/M2 | SYSTOLIC BLOOD PRESSURE: 103 MMHG | WEIGHT: 116.8 LBS | OXYGEN SATURATION: 100 %

## 2023-11-20 DIAGNOSIS — K58.2 IRRITABLE BOWEL SYNDROME WITH BOTH CONSTIPATION AND DIARRHEA: ICD-10-CM

## 2023-11-20 DIAGNOSIS — K21.9 GERD WITHOUT ESOPHAGITIS: Primary | ICD-10-CM

## 2023-11-20 PROCEDURE — 99214 OFFICE O/P EST MOD 30 MIN: CPT | Performed by: INTERNAL MEDICINE

## 2023-11-20 RX ORDER — OMEPRAZOLE 20 MG/1
20 CAPSULE, DELAYED RELEASE ORAL
Qty: 90 CAPSULE | Refills: 2 | Status: SHIPPED | OUTPATIENT
Start: 2023-11-20

## 2023-11-20 ASSESSMENT — ENCOUNTER SYMPTOMS
NAUSEA: 1
DIARRHEA: 1
CONSTIPATION: 1

## 2023-11-20 NOTE — PROGRESS NOTES
Angelique Moore (:  2000) is a 21 y.o. female,established patient here for evaluation of the following chief complaint(s): Endoscopy follow-up  Follow-up         ASSESSMENT/PLAN:  1. GERD without esophagitis  -     omeprazole (PRILOSEC) 20 MG delayed release capsule; Take 1 capsule by mouth every morning (before breakfast), Disp-90 capsule, R-2Normal  2. Irritable bowel syndrome with both constipation and diarrhea    Add Bulking/fiber agent on a daily basis and take Imodium as needed       Subjective   SUBJECTIVE/OBJECTIVE  Prior endoscopy showed mild gastritis random esophageal gastric and duodenal biopsies were taken: No sign of eosinophilic esophagitis H. pylori or celiac disease. Colonoscopy was normal with random ileal and colon biopsies most likely she has irritable bowel syndrome      Review of Systems   Gastrointestinal:  Positive for constipation, diarrhea and nausea. Objective     Physical Exam  HENT:      Head: Normocephalic. Mouth/Throat:      Mouth: Mucous membranes are moist.   Eyes:      General: No scleral icterus. Cardiovascular:      Rate and Rhythm: Normal rate and regular rhythm. Pulmonary:      Effort: No respiratory distress. Abdominal:      General: There is no distension. Tenderness: There is no abdominal tenderness. There is no rebound. Lymphadenopathy:      Cervical: No cervical adenopathy. Skin:     Coloration: Skin is not jaundiced. Findings: No bruising. Neurological:      General: No focal deficit present. Mental Status: She is alert. No follow-ups on file. An electronic signature was used to authenticate this note.     --Segun Snyder MD

## 2023-12-08 ENCOUNTER — APPOINTMENT (RX ONLY)
Dept: URBAN - METROPOLITAN AREA CLINIC 329 | Facility: CLINIC | Age: 23
Setting detail: DERMATOLOGY
End: 2023-12-08

## 2023-12-08 ENCOUNTER — RX ONLY (OUTPATIENT)
Age: 23
Setting detail: RX ONLY
End: 2023-12-08

## 2023-12-08 DIAGNOSIS — L70.0 ACNE VULGARIS: ICD-10-CM

## 2023-12-08 DIAGNOSIS — L20.89 OTHER ATOPIC DERMATITIS: ICD-10-CM

## 2023-12-08 PROCEDURE — ? RINVOQ MONITORING

## 2023-12-08 PROCEDURE — ? OTHER

## 2023-12-08 PROCEDURE — 99214 OFFICE O/P EST MOD 30 MIN: CPT

## 2023-12-08 PROCEDURE — ? COUNSELING

## 2023-12-08 PROCEDURE — ? PRESCRIPTION

## 2023-12-08 PROCEDURE — ? PRESCRIPTION MEDICATION MANAGEMENT

## 2023-12-08 PROCEDURE — ? FULL BODY SKIN EXAM - DECLINED

## 2023-12-08 RX ORDER — MOMETASONE FUROATE 1 MG/G
OINTMENT TOPICAL
Qty: 45 | Refills: 2 | Status: ERX | COMMUNITY
Start: 2023-12-08

## 2023-12-08 RX ORDER — MINOCYCLINE HYDROCHLORIDE 100 MG/1
CAPSULE ORAL
Qty: 30 | Refills: 3 | Status: ERX

## 2023-12-08 RX ORDER — TRIAMCINOLONE ACETONIDE 1 MG/G
OINTMENT TOPICAL
Qty: 454 | Refills: 3 | Status: ERX | COMMUNITY
Start: 2023-12-08

## 2023-12-08 RX ADMIN — MOMETASONE FUROATE: 1 OINTMENT TOPICAL at 00:00

## 2023-12-08 RX ADMIN — TRIAMCINOLONE ACETONIDE: 1 OINTMENT TOPICAL at 00:00

## 2023-12-08 ASSESSMENT — LOCATION DETAILED DESCRIPTION DERM
LOCATION DETAILED: LEFT ANTERIOR SHOULDER
LOCATION DETAILED: INFERIOR THORACIC SPINE
LOCATION DETAILED: NASAL DORSUM
LOCATION DETAILED: LEFT CENTRAL MALAR CHEEK
LOCATION DETAILED: LEFT SUPERIOR MEDIAL UPPER BACK
LOCATION DETAILED: LEFT INFERIOR MEDIAL UPPER BACK
LOCATION DETAILED: RIGHT NASAL SIDEWALL
LOCATION DETAILED: RIGHT INFERIOR UPPER BACK
LOCATION DETAILED: LEFT INFERIOR MEDIAL MALAR CHEEK
LOCATION DETAILED: NASAL SUPRATIP

## 2023-12-08 ASSESSMENT — LOCATION SIMPLE DESCRIPTION DERM
LOCATION SIMPLE: UPPER BACK
LOCATION SIMPLE: RIGHT NOSE
LOCATION SIMPLE: RIGHT UPPER BACK
LOCATION SIMPLE: LEFT CHEEK
LOCATION SIMPLE: LEFT SHOULDER
LOCATION SIMPLE: NOSE
LOCATION SIMPLE: LEFT UPPER BACK

## 2023-12-08 ASSESSMENT — LOCATION ZONE DERM
LOCATION ZONE: TRUNK
LOCATION ZONE: ARM
LOCATION ZONE: NOSE
LOCATION ZONE: FACE

## 2023-12-08 ASSESSMENT — BSA ECZEMA: % BODY COVERED IN ECZEMA: 76

## 2023-12-08 ASSESSMENT — ITCH NUMERIC RATING SCALE: HOW SEVERE IS YOUR ITCHING?: 5

## 2023-12-08 NOTE — PROCEDURE: COUNSELING
Detail Level: Detailed
Benzoyl Peroxide Pregnancy And Lactation Text: This medication is Pregnancy Category C. It is unknown if benzoyl peroxide is excreted in breast milk.
Tetracycline Counseling: Patient counseled regarding possible photosensitivity and increased risk for sunburn.  Patient instructed to avoid sunlight, if possible.  When exposed to sunlight, patients should wear protective clothing, sunglasses, and sunscreen.  The patient was instructed to call the office immediately if the following severe adverse effects occur:  hearing changes, easy bruising/bleeding, severe headache, or vision changes.  The patient verbalized understanding of the proper use and possible adverse effects of tetracycline.  All of the patient's questions and concerns were addressed. Patient understands to avoid pregnancy while on therapy due to potential birth defects.
Topical Sulfur Applications Counseling: Topical Sulfur Counseling: Patient counseled that this medication may cause skin irritation or allergic reactions.  In the event of skin irritation, the patient was advised to reduce the amount of the drug applied or use it less frequently.   The patient verbalized understanding of the proper use and possible adverse effects of topical sulfur application.  All of the patient's questions and concerns were addressed.
High Dose Vitamin A Pregnancy And Lactation Text: High dose vitamin A therapy is contraindicated during pregnancy and breast feeding.
Azithromycin Pregnancy And Lactation Text: This medication is considered safe during pregnancy and is also secreted in breast milk.
Doxycycline Counseling:  Patient counseled regarding possible photosensitivity and increased risk for sunburn.  Patient instructed to avoid sunlight, if possible.  When exposed to sunlight, patients should wear protective clothing, sunglasses, and sunscreen.  The patient was instructed to call the office immediately if the following severe adverse effects occur:  hearing changes, easy bruising/bleeding, severe headache, or vision changes.  The patient verbalized understanding of the proper use and possible adverse effects of doxycycline.  All of the patient's questions and concerns were addressed.
Spironolactone Counseling: Patient advised regarding risks of diarrhea, abdominal pain, hyperkalemia, birth defects (for female patients), liver toxicity and renal toxicity. The patient may need blood work to monitor liver and kidney function and potassium levels while on therapy. The patient verbalized understanding of the proper use and possible adverse effects of spironolactone.  All of the patient's questions and concerns were addressed.
Dapsone Counseling: I discussed with the patient the risks of dapsone including but not limited to hemolytic anemia, agranulocytosis, rashes, methemoglobinemia, kidney failure, peripheral neuropathy, headaches, GI upset, and liver toxicity.  Patients who start dapsone require monitoring including baseline LFTs and weekly CBCs for the first month, then every month thereafter.  The patient verbalized understanding of the proper use and possible adverse effects of dapsone.  All of the patient's questions and concerns were addressed.
Isotretinoin Pregnancy And Lactation Text: This medication is Pregnancy Category X and is considered extremely dangerous during pregnancy. It is unknown if it is excreted in breast milk.
Include Pregnancy/Lactation Warning?: No
Tazorac Counseling:  Patient advised that medication is irritating and drying.  Patient may need to apply sparingly and wash off after an hour before eventually leaving it on overnight.  The patient verbalized understanding of the proper use and possible adverse effects of tazorac.  All of the patient's questions and concerns were addressed.
Sarecycline Counseling: Patient advised regarding possible photosensitivity and discoloration of the teeth, skin, lips, tongue and gums.  Patient instructed to avoid sunlight, if possible.  When exposed to sunlight, patients should wear protective clothing, sunglasses, and sunscreen.  The patient was instructed to call the office immediately if the following severe adverse effects occur:  hearing changes, easy bruising/bleeding, severe headache, or vision changes.  The patient verbalized understanding of the proper use and possible adverse effects of sarecycline.  All of the patient's questions and concerns were addressed.
Aklief Pregnancy And Lactation Text: It is unknown if this medication is safe to use during pregnancy.  It is unknown if this medication is excreted in breast milk.  Breastfeeding women should use the topical cream on the smallest area of the skin for the shortest time needed while breastfeeding.  Do not apply to nipple and areola.
Winlevi Pregnancy And Lactation Text: This medication is considered safe during pregnancy and breastfeeding.
Bactrim Counseling:  I discussed with the patient the risks of sulfa antibiotics including but not limited to GI upset, allergic reaction, drug rash, diarrhea, dizziness, photosensitivity, and yeast infections.  Rarely, more serious reactions can occur including but not limited to aplastic anemia, agranulocytosis, methemoglobinemia, blood dyscrasias, liver or kidney failure, lung infiltrates or desquamative/blistering drug rashes.
Cleanser Recommendations: Gentle cleanser like Dove for sensitive skin, Cetaphil gentle cleanser or Cerave hydrating cleanser
Azelaic Acid Pregnancy And Lactation Text: This medication is considered safe during pregnancy and breast feeding.
Topical Clindamycin Counseling: Patient counseled that this medication may cause skin irritation or allergic reactions.  In the event of skin irritation, the patient was advised to reduce the amount of the drug applied or use it less frequently.   The patient verbalized understanding of the proper use and possible adverse effects of clindamycin.  All of the patient's questions and concerns were addressed.
Birth Control Pills Counseling: Birth Control Pill Counseling: I discussed with the patient the potential side effects of OCPs including but not limited to increased risk of stroke, heart attack, thrombophlebitis, deep venous thrombosis, hepatic adenomas, breast changes, GI upset, headaches, and depression.  The patient verbalized understanding of the proper use and possible adverse effects of OCPs. All of the patient's questions and concerns were addressed.
Erythromycin Pregnancy And Lactation Text: This medication is Pregnancy Category B and is considered safe during pregnancy. It is also excreted in breast milk.
High Dose Vitamin A Counseling: Side effects reviewed, pt to contact office should one occur.
Doxycycline Pregnancy And Lactation Text: This medication is Pregnancy Category D and not consider safe during pregnancy. It is also excreted in breast milk but is considered safe for shorter treatment courses.
Topical Clindamycin Pregnancy And Lactation Text: This medication is Pregnancy Category B and is considered safe during pregnancy. It is unknown if it is excreted in breast milk.
Benzoyl Peroxide Counseling: Patient counseled that medicine may cause skin irritation and bleach clothing.  In the event of skin irritation, the patient was advised to reduce the amount of the drug applied or use it less frequently.   The patient verbalized understanding of the proper use and possible adverse effects of benzoyl peroxide.  All of the patient's questions and concerns were addressed.
Sunscreen Recommendation Label Override: Cerave mineral sunscreen
Spironolactone Pregnancy And Lactation Text: This medication can cause feminization of the male fetus and should be avoided during pregnancy. The active metabolite is also found in breast milk.
Moisturizer Recommendations: Cerave lotion, Cetaphil lotion
Topical Sulfur Applications Pregnancy And Lactation Text: This medication is Pregnancy Category C and has an unknown safety profile during pregnancy. It is unknown if this topical medication is excreted in breast milk.
Topical Retinoid counseling:  Patient advised to apply a pea-sized amount only at bedtime and wait 30 minutes after washing their face before applying.  If too drying, patient may add a non-comedogenic moisturizer. The patient verbalized understanding of the proper use and possible adverse effects of retinoids.  All of the patient's questions and concerns were addressed.
Tetracycline Pregnancy And Lactation Text: This medication is Pregnancy Category D and not consider safe during pregnancy. It is also excreted in breast milk.
Minocycline Counseling: Patient advised regarding possible photosensitivity and discoloration of the teeth, skin, lips, tongue and gums.  Patient instructed to avoid sunlight, if possible.  When exposed to sunlight, patients should wear protective clothing, sunglasses, and sunscreen.  The patient was instructed to call the office immediately if the following severe adverse effects occur:  hearing changes, easy bruising/bleeding, severe headache, or vision changes.  The patient verbalized understanding of the proper use and possible adverse effects of minocycline.  All of the patient's questions and concerns were addressed.
Azithromycin Counseling:  I discussed with the patient the risks of azithromycin including but not limited to GI upset, allergic reaction, drug rash, diarrhea, and yeast infections.
Dapsone Pregnancy And Lactation Text: This medication is Pregnancy Category C and is not considered safe during pregnancy or breast feeding.
Detail Level: Zone
Tazorac Pregnancy And Lactation Text: This medication is not safe during pregnancy. It is unknown if this medication is excreted in breast milk.
Birth Control Pills Pregnancy And Lactation Text: This medication should be avoided if pregnant and for the first 30 days post-partum.
Isotretinoin Counseling: Patient should get monthly blood tests, not donate blood, not drive at night if vision affected, not share medication, and not undergo elective surgery for 6 months after tx completed. Side effects reviewed, pt to contact office should one occur.
Topical Retinoid Pregnancy And Lactation Text: This medication is Pregnancy Category C. It is unknown if this medication is excreted in breast milk.
Aklief counseling:  Patient advised to apply a pea-sized amount only at bedtime and wait 30 minutes after washing their face before applying.  If too drying, patient may add a non-comedogenic moisturizer.  The most commonly reported side effects including irritation, redness, scaling, dryness, stinging, burning, itching, and increased risk of sunburn.  The patient verbalized understanding of the proper use and possible adverse effects of retinoids.  All of the patient's questions and concerns were addressed.
Winlevi Counseling:  I discussed with the patient the risks of topical clascoterone including but not limited to erythema, scaling, itching, and stinging. Patient voiced their understanding.
Bactrim Pregnancy And Lactation Text: This medication is Pregnancy Category D and is known to cause fetal risk.  It is also excreted in breast milk.
Erythromycin Counseling:  I discussed with the patient the risks of erythromycin including but not limited to GI upset, allergic reaction, drug rash, diarrhea, increase in liver enzymes, and yeast infections.
Azelaic Acid Counseling: Patient counseled that medicine may cause skin irritation and to avoid applying near the eyes.  In the event of skin irritation, the patient was advised to reduce the amount of the drug applied or use it less frequently.   The patient verbalized understanding of the proper use and possible adverse effects of azelaic acid.  All of the patient's questions and concerns were addressed.

## 2023-12-08 NOTE — HPI: RASH (ATOPIC DERMATITIS)
How Severe Is Your Atopic Dermatitis?: severe
Is This A New Presentation, Or A Follow-Up?: Follow Up Atopic Dermatitis
Additional History: Patient has been off Rinvoq for a week.

## 2023-12-08 NOTE — PROCEDURE: OTHER
Other (Free Text): Patient could not tolerate Dupixent, too painful
Note Text (......Xxx Chief Complaint.): This diagnosis correlates with the
Detail Level: Zone
Render Risk Assessment In Note?: no

## 2023-12-08 NOTE — PROCEDURE: PRESCRIPTION MEDICATION MANAGEMENT
Plan: Rinvoq had to stop due to insurance \\nPatient is still awaiting approval
Detail Level: Zone
Render In Strict Bullet Format?: No
Continue Regimen: Minocycline 100mg at flare\\nBenzoyl peroxide cleanser

## 2024-01-16 ENCOUNTER — OFFICE VISIT (OUTPATIENT)
Dept: OBGYN CLINIC | Age: 24
End: 2024-01-16
Payer: COMMERCIAL

## 2024-01-16 VITALS
DIASTOLIC BLOOD PRESSURE: 62 MMHG | BODY MASS INDEX: 20.02 KG/M2 | HEIGHT: 63 IN | WEIGHT: 113 LBS | SYSTOLIC BLOOD PRESSURE: 104 MMHG

## 2024-01-16 DIAGNOSIS — N94.6 DYSMENORRHEA: ICD-10-CM

## 2024-01-16 DIAGNOSIS — N94.10 DYSPAREUNIA IN FEMALE: ICD-10-CM

## 2024-01-16 DIAGNOSIS — R10.30 LOWER ABDOMINAL PAIN: Primary | ICD-10-CM

## 2024-01-16 PROBLEM — R10.13 EPIGASTRIC ABDOMINAL PAIN: Status: RESOLVED | Noted: 2023-09-07 | Resolved: 2024-01-16

## 2024-01-16 LAB
BILIRUBIN, URINE, POC: ABNORMAL
BLOOD URINE, POC: NEGATIVE
GLUCOSE URINE, POC: NEGATIVE
KETONES, URINE, POC: ABNORMAL
LEUKOCYTE ESTERASE, URINE, POC: ABNORMAL
NITRITE, URINE, POC: NEGATIVE
PH, URINE, POC: 6 (ref 4.6–8)
PROTEIN,URINE, POC: ABNORMAL
SPECIFIC GRAVITY, URINE, POC: 1.03 (ref 1–1.03)
URINALYSIS CLARITY, POC: ABNORMAL
URINALYSIS COLOR, POC: YELLOW
UROBILINOGEN, POC: NORMAL

## 2024-01-16 PROCEDURE — 81001 URINALYSIS AUTO W/SCOPE: CPT | Performed by: OBSTETRICS & GYNECOLOGY

## 2024-01-16 PROCEDURE — 99214 OFFICE O/P EST MOD 30 MIN: CPT | Performed by: OBSTETRICS & GYNECOLOGY

## 2024-01-16 NOTE — ASSESSMENT & PLAN NOTE
D/W pt more likely than not due to known IBS but could be endo/adenomyosis  Will cont OCPs and have pt RTC for pelvic US and consideration of Dx lap pending results

## 2024-01-16 NOTE — PROGRESS NOTES
[facility]  [address], [place] [address]  [phone]    Dre Brooks MD, FACOG  [facility]aine Huertas [facility]  Krystle Randle MD, FACOG    Assessment/Plan     Patient Active Problem List    Diagnosis Date Noted    Dysmenorrhea 12/02/2022     Priority: Medium     Overview Note:     noted       Assessment & Plan Note:     See A&P under abdom pain  Encouraged pt to start taking ibuprofen about 1-2 days before menses begins to decrease bleeding and cramping by potentially 50%         Dyspareunia in female 12/02/2022     Priority: Medium     Overview Note:     noted       Assessment & Plan Note:     See A&P under abdom pain      Bilateral ovarian cysts 10/27/2022     Priority: Medium     Overview Note:     noted      Mild intermittent asthma without complication 10/27/2022     Priority: Medium    Transaminitis 10/27/2022     Priority: Medium    Lower abdominal pain 01/16/2024     Overview Note:     noted       Assessment & Plan Note:     D/W pt more likely than not due to known IBS but could be endo/adenomyosis  Will cont OCPs and have pt RTC for pelvic US and consideration of Dx lap pending results      Change in bowel habits 09/07/2023     Overview Note:     Added automatically from request for surgery [id number]      Blood in stool 09/07/2023     Overview Note:     Added automatically from request for surgery [id number]      Esophagitis, eosinophilic      Overview Note:     no current medications/infusions      Postoperative wound infection 02/16/2022    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

## 2024-01-16 NOTE — ASSESSMENT & PLAN NOTE
See A&P under abdom pain  Encouraged pt to start taking ibuprofen about 1-2 days before menses begins to decrease bleeding and cramping by potentially 50%

## 2024-01-16 NOTE — PROGRESS NOTES
Patient comes in today for lower abdominal pain. Patient states she noticed the lower abdominal pain. She states she had colonoscopy 11/14/2023 thinking it was stomach issues. Patient states then her brother (who is in medical school) pointed out that it was pelvic pain. Patient states he pointed out around [place]. Patient states she noticed her breast have been tender and feeling nausea. Patient states she has not been able to eat and losing weight.      LAST PAP:  05/16/2023, Negative     LAST MAMMO:  Never    LMP:  Patient's last menstrual period was 12/26/2023 (exact date).    BIRTH CONTROL:  OCP (estrogen/progesterone)    TOBACCO USE:  No    FAMILY HISTORY OF:   Breast Cancer:  No   Ovarian Cancer:  No   Uterine Cancer:  No   Colon Cancer:  No    Vitals:    01/16/24 1335   BP: 104/62   Site: Left Upper Arm   Position: Sitting   Weight: 51.3 kg (113 lb)   Height: 1.588 m (5' 2.5\")        Connie Francis MA  01/16/24  1:45 PM

## 2024-01-16 NOTE — PATIENT INSTRUCTIONS
Please come back as scheduled for your ultrasound  Thanks for coming to see us today and letting us take care of you!

## 2024-01-18 LAB
A VAGINAE DNA VAG QL NAA+PROBE: ABNORMAL SCORE
BACTERIA SPEC CULT: NORMAL
BACTERIA SPEC CULT: NORMAL
BVAB2 DNA VAG QL NAA+PROBE: ABNORMAL SCORE
C ALBICANS DNA VAG QL NAA+PROBE: NEGATIVE
C GLABRATA DNA VAG QL NAA+PROBE: NEGATIVE
C TRACH RRNA SPEC QL NAA+PROBE: NEGATIVE
MEGA1 DNA VAG QL NAA+PROBE: ABNORMAL SCORE
N GONORRHOEA RRNA SPEC QL NAA+PROBE: NEGATIVE
SERVICE CMNT-IMP: NORMAL
SPECIMEN SOURCE: ABNORMAL
T VAGINALIS RRNA SPEC QL NAA+PROBE: NEGATIVE

## 2024-01-18 NOTE — TELEPHONE ENCOUNTER
Patient LM request results and medication before the weekend.     LMVM for patient to call the office. No other details given. caty

## 2024-01-22 NOTE — TELEPHONE ENCOUNTER
Patient send a My Chart message regarding medication. I sent message back to back via My Chart about choices of medication.

## 2024-01-23 RX ORDER — METRONIDAZOLE 500 MG/1
500 TABLET ORAL 2 TIMES DAILY
Qty: 14 TABLET | Refills: 0 | Status: SHIPPED | OUTPATIENT
Start: 2024-01-23 | End: 2024-01-30

## 2024-02-06 ENCOUNTER — OFFICE VISIT (OUTPATIENT)
Dept: OBGYN CLINIC | Age: 24
End: 2024-02-06
Payer: COMMERCIAL

## 2024-02-06 ENCOUNTER — PROCEDURE VISIT (OUTPATIENT)
Dept: OBGYN CLINIC | Age: 24
End: 2024-02-06
Payer: COMMERCIAL

## 2024-02-06 VITALS
HEIGHT: 63 IN | WEIGHT: 119 LBS | DIASTOLIC BLOOD PRESSURE: 68 MMHG | SYSTOLIC BLOOD PRESSURE: 100 MMHG | BODY MASS INDEX: 21.09 KG/M2

## 2024-02-06 DIAGNOSIS — N94.6 DYSMENORRHEA: ICD-10-CM

## 2024-02-06 DIAGNOSIS — R10.30 LOWER ABDOMINAL PAIN: Primary | ICD-10-CM

## 2024-02-06 DIAGNOSIS — N94.10 DYSPAREUNIA IN FEMALE: ICD-10-CM

## 2024-02-06 DIAGNOSIS — R10.2 PELVIC PAIN: Primary | ICD-10-CM

## 2024-02-06 PROCEDURE — 76830 TRANSVAGINAL US NON-OB: CPT | Performed by: OBSTETRICS & GYNECOLOGY

## 2024-02-06 PROCEDURE — 99214 OFFICE O/P EST MOD 30 MIN: CPT | Performed by: OBSTETRICS & GYNECOLOGY

## 2024-02-06 NOTE — PROGRESS NOTES
[facility]  [address] [facility] [facility] [address], [place] [address]  [phone]    Dre Brooks MD, FACOG  Elaine Huertas [facility]  Krystle Randle MD, FACOG    Assessment/Plan     Patient Active Problem List    Diagnosis Date Noted    Dysmenorrhea 12/02/2022     Priority: Medium     Overview Note:     noted       Assessment & Plan Note:     See A&P under abdom pain       Dyspareunia in female 12/02/2022     Priority: Medium     Overview Note:     noted       Assessment & Plan Note:     See A&P under abdom pain       Bilateral ovarian cysts 10/27/2022     Priority: Medium     Overview Note:     noted      Mild intermittent asthma without complication 10/27/2022     Priority: Medium    Transaminitis 10/27/2022     Priority: Medium    Lower abdominal pain 01/16/2024     Overview Note:     noted       Assessment & Plan Note:     Us ordered today, reviewed and D/W pt in detail - nml  Much improved but still present  D/W her options:  cont OCPs, consider swtich to [place], Dx lap  Pt would like to cont OCPs for now and see if improvement continues      Change in bowel habits 09/07/2023     Overview Note:     Added automatically from request for surgery [id number]      Blood in stool 09/07/2023     Overview Note:     Added automatically from request for surgery [id number]      Esophagitis, eosinophilic      Overview Note:     no current medications/infusions      Postoperative wound infection 02/16/2022    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

## 2024-02-06 NOTE — ASSESSMENT & PLAN NOTE
Us ordered today, reviewed and D/W pt in detail - nml  Much improved but still present  D/W her options:  cont OCPs, consider swtich to [place], Dx lap  Pt would like to cont OCPs for now and see if improvement continues

## 2024-02-06 NOTE — PROGRESS NOTES
Patient comes in today for gyn follow up with ultrasound.     LAST PAP:  05/16/2023, Negative    LAST MAMMO:  Never    LMP:  Patient's last menstrual period was 12/26/2023 (exact date).    BIRTH CONTROL:  OCP (estrogen/progesterone)    TOBACCO USE:  No    FAMILY HISTORY OF:   Breast Cancer:  No   Ovarian Cancer:  No   Uterine Cancer:  No   Colon Cancer:  No    Vitals:    02/06/24 0931   BP: 100/68   Site: Left Upper Arm   Position: Sitting   Weight: 54 kg (119 lb)   Height: 1.588 m (5' 2.5\")        Connie Francis MA  02/06/24  9:43 AM

## 2024-03-05 RX ORDER — METRONIDAZOLE 500 MG/1
TABLET ORAL
Qty: 14 TABLET | Refills: 0 | OUTPATIENT
Start: 2024-03-05

## 2024-05-20 RX ORDER — NORETHINDRONE ACETATE AND ETHINYL ESTRADIOL AND FERROUS FUMARATE 1MG-20(21)
1 KIT ORAL DAILY
Qty: 28 TABLET | Refills: 12 | OUTPATIENT
Start: 2024-05-20

## 2024-05-21 ENCOUNTER — OFFICE VISIT (OUTPATIENT)
Dept: OBGYN CLINIC | Age: 24
End: 2024-05-21
Payer: COMMERCIAL

## 2024-05-21 VITALS
WEIGHT: 126 LBS | BODY MASS INDEX: 22.32 KG/M2 | DIASTOLIC BLOOD PRESSURE: 68 MMHG | SYSTOLIC BLOOD PRESSURE: 108 MMHG | HEIGHT: 63 IN

## 2024-05-21 DIAGNOSIS — N94.10 DYSPAREUNIA IN FEMALE: ICD-10-CM

## 2024-05-21 DIAGNOSIS — N94.6 DYSMENORRHEA: ICD-10-CM

## 2024-05-21 DIAGNOSIS — Z01.419 WOMEN'S ANNUAL ROUTINE GYNECOLOGICAL EXAMINATION: Primary | ICD-10-CM

## 2024-05-21 PROBLEM — R10.30 LOWER ABDOMINAL PAIN: Status: RESOLVED | Noted: 2024-01-16 | Resolved: 2024-05-21

## 2024-05-21 PROCEDURE — 99459 PELVIC EXAMINATION: CPT | Performed by: OBSTETRICS & GYNECOLOGY

## 2024-05-21 PROCEDURE — 99214 OFFICE O/P EST MOD 30 MIN: CPT | Performed by: OBSTETRICS & GYNECOLOGY

## 2024-05-21 PROCEDURE — 99395 PREV VISIT EST AGE 18-39: CPT | Performed by: OBSTETRICS & GYNECOLOGY

## 2024-05-21 RX ORDER — NORETHINDRONE ACETATE AND ETHINYL ESTRADIOL 1MG-20(21)
1 KIT ORAL DAILY
Qty: 1 PACKET | Refills: 12 | Status: SHIPPED | OUTPATIENT
Start: 2024-05-21

## 2024-05-21 SDOH — ECONOMIC STABILITY: FOOD INSECURITY: WITHIN THE PAST 12 MONTHS, THE FOOD YOU BOUGHT JUST DIDN'T LAST AND YOU DIDN'T HAVE MONEY TO GET MORE.: NEVER TRUE

## 2024-05-21 SDOH — ECONOMIC STABILITY: FOOD INSECURITY: WITHIN THE PAST 12 MONTHS, YOU WORRIED THAT YOUR FOOD WOULD RUN OUT BEFORE YOU GOT MONEY TO BUY MORE.: NEVER TRUE

## 2024-05-21 SDOH — ECONOMIC STABILITY: INCOME INSECURITY: HOW HARD IS IT FOR YOU TO PAY FOR THE VERY BASICS LIKE FOOD, HOUSING, MEDICAL CARE, AND HEATING?: NOT HARD AT ALL

## 2024-05-21 ASSESSMENT — PATIENT HEALTH QUESTIONNAIRE - PHQ9
SUM OF ALL RESPONSES TO PHQ QUESTIONS 1-9: 0
2. FEELING DOWN, DEPRESSED OR HOPELESS: NOT AT ALL
SUM OF ALL RESPONSES TO PHQ QUESTIONS 1-9: 0
SUM OF ALL RESPONSES TO PHQ QUESTIONS 1-9: 0
1. LITTLE INTEREST OR PLEASURE IN DOING THINGS: NOT AT ALL
SUM OF ALL RESPONSES TO PHQ QUESTIONS 1-9: 0
SUM OF ALL RESPONSES TO PHQ9 QUESTIONS 1 & 2: 0

## 2024-05-21 NOTE — PROGRESS NOTES
Patient comes in today for annual exam.     LAST PAP:  05/16/2023, Negative     LAST MAMMO:  Never    LMP:  Patient's last menstrual period was 05/20/2024 (exact date).    BIRTH CONTROL:  OCP (estrogen/progesterone)    TOBACCO USE:  No    FAMILY HISTORY OF:   Breast Cancer:  No   Ovarian Cancer:  No   Uterine Cancer:  No   Colon Cancer:  No    Vitals:    05/21/24 0911   BP: 108/68   Site: Left Upper Arm   Position: Sitting   Weight: 57.2 kg (126 lb)   Height: 1.588 m (5' 2.5\")        Connie Francis MA  05/21/24  9:15 AM

## 2024-05-21 NOTE — ASSESSMENT & PLAN NOTE
Exam as below  Encouraged annual exams with paps as indicated  Pt to F/U with PCP for all non-gyn health related issues

## 2024-05-21 NOTE — PATIENT INSTRUCTIONS
Follow up as needed or next year for your yearly female exam.  Thanks for coming to see us today and letting us take care of you!

## 2024-05-21 NOTE — PROGRESS NOTES
[facility]  [address] [facility], [place] [address]  [phone]    Dre Brooks MD, FACOG  Elaine Huertas [facility]  Krystle Randle MD, FACOG      Assessment/Plan     Patient Active Problem List    Diagnosis Date Noted    Dysmenorrhea 12/02/2022     Priority: Medium     Overview Note:     noted       Assessment & Plan Note:     Signif improved with OCPs, wishes to cont - refill sent      Dyspareunia in female 12/02/2022     Priority: Medium     Overview Note:     noted       Assessment & Plan Note:     Signif improved with OCPs, wishes to cont - refill sent      Bilateral ovarian cysts 10/27/2022     Priority: Medium     Overview Note:     noted      Mild intermittent asthma without complication 10/27/2022     Priority: Medium    Transaminitis 10/27/2022     Priority: Medium    Change in bowel habits 09/07/2023     Overview Note:     Added automatically from request for surgery [id number]      Blood in stool 09/07/2023     Overview Note:     Added automatically from request for surgery [id number]      Esophagitis, eosinophilic      Overview Note:     no current medications/infusions      Women's annual routine gynecological examination 05/16/2023     Overview Note:     Pap with reflex done 5/16/23 - neg       Assessment & Plan Note:     Exam as below  Encouraged annual exams with paps as indicated  Pt to F/U with PCP for all non-gyn health related issues       Postoperative wound infection 02/16/2022    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

## 2024-05-21 NOTE — PROGRESS NOTES
Chaperone for Intimate Exam     Chaperone was offer accepted as part of the rooming process    Chaperone: Nicole Ahn

## 2024-06-04 ENCOUNTER — OFFICE VISIT (OUTPATIENT)
Dept: FAMILY MEDICINE CLINIC | Facility: CLINIC | Age: 24
End: 2024-06-04
Payer: COMMERCIAL

## 2024-06-04 VITALS
WEIGHT: 121 LBS | BODY MASS INDEX: 21.44 KG/M2 | DIASTOLIC BLOOD PRESSURE: 68 MMHG | HEIGHT: 63 IN | HEART RATE: 81 BPM | TEMPERATURE: 98.3 F | OXYGEN SATURATION: 99 % | SYSTOLIC BLOOD PRESSURE: 94 MMHG

## 2024-06-04 DIAGNOSIS — K20.0 ESOPHAGITIS, EOSINOPHILIC: ICD-10-CM

## 2024-06-04 DIAGNOSIS — L30.9 ECZEMA, UNSPECIFIED TYPE: ICD-10-CM

## 2024-06-04 DIAGNOSIS — G43.909 MIGRAINE WITHOUT STATUS MIGRAINOSUS, NOT INTRACTABLE, UNSPECIFIED MIGRAINE TYPE: Primary | ICD-10-CM

## 2024-06-04 DIAGNOSIS — J45.20 MILD INTERMITTENT ASTHMA WITHOUT COMPLICATION: ICD-10-CM

## 2024-06-04 DIAGNOSIS — G44.86 CERVICOGENIC HEADACHE: ICD-10-CM

## 2024-06-04 PROCEDURE — 99214 OFFICE O/P EST MOD 30 MIN: CPT | Performed by: FAMILY MEDICINE

## 2024-06-04 RX ORDER — ALBUTEROL SULFATE 90 UG/1
2 AEROSOL, METERED RESPIRATORY (INHALATION) EVERY 4 HOURS PRN
Qty: 18 G | Refills: 3 | Status: SHIPPED | OUTPATIENT
Start: 2024-06-04

## 2024-06-04 RX ORDER — RIZATRIPTAN BENZOATE 10 MG/1
TABLET ORAL
Qty: 18 TABLET | Refills: 5 | Status: SHIPPED | OUTPATIENT
Start: 2024-06-04

## 2024-06-04 RX ORDER — BUPROPION HYDROCHLORIDE 150 MG/1
150 TABLET ORAL EVERY MORNING
Qty: 30 TABLET | Refills: 3 | Status: SHIPPED | OUTPATIENT
Start: 2024-06-04

## 2024-06-04 RX ORDER — PREDNISONE 20 MG/1
20 TABLET ORAL DAILY
Qty: 5 TABLET | Refills: 0 | Status: SHIPPED | OUTPATIENT
Start: 2024-06-04 | End: 2024-06-09

## 2024-06-04 NOTE — PROGRESS NOTES
Andria Rene (: 2000) is a 23 y.o. female, established patient, here for evaluation of the following chief complaint(s):  Referral - General (Neurologist for migraines/  ) and Back Pain (Having back and neck pain would like a scan /)       ASSESSMENT/PLAN:  1. Migraine without status migrainosus, not intractable, unspecified migraine type  -     rizatriptan (MAXALT) 10 MG tablet; Take 10 mg as needed for headache. if symptoms persist or return, may repeat dose after 2 hours. No more than two pills in 24 hour period., Disp-18 tablet, R-5Normal  2. Mild intermittent asthma without complication  -     albuterol sulfate HFA (PROVENTIL;VENTOLIN;PROAIR) 108 (90 Base) MCG/ACT inhaler; Inhale 2 puffs into the lungs every 4 hours as needed for Wheezing or Shortness of Breath, Disp-18 g, R-3Normal  -     AFL - [facility]  3. Cervicogenic headache  -     XR CERVICAL SPINE (4 OR 5 VIEWS); Future  -     MRI BRAIN W WO CONTRAST; Future  -     [facility]  4. Esophagitis, eosinophilic  -     AFL - [facility]  5. Eczema, unspecified type  -     AFL - [facility]    Patient Instructions   Would recommend advil 200-400mg when the headache initially starts, if no improvement in 30 minutes, take the maxalt for the headaches.  Can take advil 200-400mg 3 times a day for pain  We will refer you to neurology, as well as order imaging of your neck and brain/head.    For anxiety/depression, will trial wellbutrin 150mg xl once a day, and patient to return in 2-3 months to discuss medication and consider change if needed.    For the severe eczema breakout, will trial prednisone 20mg daily for 5 days, patient states has tolerated previously (on allegy list but has tolerated since), and will refer to allergy as well.    SUBJECTIVE/OBJECTIVE:  HPI  See above,    Chronic headaches worsening,   Uses advil sparingly, maxalt sometimes, uses

## 2024-06-04 NOTE — PATIENT INSTRUCTIONS
Would recommend advil 200-400mg when the headache initially starts, if no improvement in 30 minutes, take the maxalt for the headaches.  Can take advil 200-400mg 3 times a day for pain  We will refer you to neurology, as well as order imaging of your neck and brain/head.    For anxiety/depression, will trial wellbutrin 150mg xl once a day, and patient to return in 2-3 months to discuss medication and consider change if needed.    For the severe eczema breakout, will trial prednisone 20mg daily for 5 days, patient states has tolerated previously (on allegy list but has tolerated since), and will refer to allergy as well.

## 2024-06-05 ENCOUNTER — HOSPITAL ENCOUNTER (OUTPATIENT)
Dept: GENERAL RADIOLOGY | Age: 24
Discharge: HOME | End: 2024-06-08
Payer: COMMERCIAL

## 2024-06-05 DIAGNOSIS — G44.86 CERVICOGENIC HEADACHE: ICD-10-CM

## 2024-06-05 PROCEDURE — 72050 X-RAY EXAM NECK SPINE 4/5VWS: CPT

## 2024-06-20 PROBLEM — Z01.419 WOMEN'S ANNUAL ROUTINE GYNECOLOGICAL EXAMINATION: Status: RESOLVED | Noted: 2023-05-16 | Resolved: 2024-06-20

## 2024-06-21 ENCOUNTER — HOSPITAL ENCOUNTER (OUTPATIENT)
Dept: MRI IMAGING | Age: 24
Discharge: HOME | End: 2024-06-21
Attending: FAMILY MEDICINE
Payer: COMMERCIAL

## 2024-06-21 DIAGNOSIS — G44.86 CERVICOGENIC HEADACHE: ICD-10-CM

## 2024-06-21 PROCEDURE — 70553 MRI BRAIN STEM W/O & W/DYE: CPT

## 2024-06-21 PROCEDURE — 6360000004 HC RX CONTRAST MEDICATION: Performed by: FAMILY MEDICINE

## 2024-06-21 PROCEDURE — A9579 GAD-BASE MR CONTRAST NOS,1ML: HCPCS | Performed by: FAMILY MEDICINE

## 2024-06-21 RX ADMIN — GADOTERIDOL 10 ML: 279.3 INJECTION, SOLUTION INTRAVENOUS at 10:51

## 2024-06-24 ENCOUNTER — TELEPHONE (OUTPATIENT)
Dept: FAMILY MEDICINE CLINIC | Facility: CLINIC | Age: 24
End: 2024-06-24

## 2024-06-24 NOTE — TELEPHONE ENCOUNTER
----- Message from Chelsea Sparrow MD sent at 6/24/2024  7:25 AM EDT -----  Please let the patient know that xray was normal.

## 2024-06-28 RX ORDER — PREDNISONE 20 MG/1
TABLET ORAL
Qty: 5 TABLET | Refills: 0 | OUTPATIENT
Start: 2024-06-28

## 2024-09-17 RX ORDER — PREDNISONE 20 MG/1
TABLET ORAL
Qty: 5 TABLET | Refills: 0 | OUTPATIENT
Start: 2024-09-17

## 2024-11-21 ENCOUNTER — APPOINTMENT (RX ONLY)
Dept: URBAN - METROPOLITAN AREA CLINIC 329 | Facility: CLINIC | Age: 24
Setting detail: DERMATOLOGY
End: 2024-11-21

## 2024-11-21 DIAGNOSIS — L20.89 OTHER ATOPIC DERMATITIS: ICD-10-CM

## 2024-11-21 DIAGNOSIS — L71.0 PERIORAL DERMATITIS: ICD-10-CM

## 2024-11-21 DIAGNOSIS — L70.0 ACNE VULGARIS: ICD-10-CM

## 2024-11-21 PROCEDURE — ? FULL BODY SKIN EXAM - DECLINED

## 2024-11-21 PROCEDURE — ? PRESCRIPTION MEDICATION MANAGEMENT

## 2024-11-21 PROCEDURE — 99214 OFFICE O/P EST MOD 30 MIN: CPT

## 2024-11-21 PROCEDURE — ? COUNSELING

## 2024-11-21 PROCEDURE — ? PRESCRIPTION

## 2024-11-21 PROCEDURE — ? RINVOQ MONITORING

## 2024-11-21 RX ORDER — DAPSONE/SPIRONOLACTONE/NIACIN 6 %-5 %-2%
CREAM (GRAM) TOPICAL
Qty: 30 | Refills: 5 | Status: ERX | COMMUNITY
Start: 2024-11-21

## 2024-11-21 RX ORDER — MOMETASONE FUROATE 1 MG/G
OINTMENT TOPICAL
Qty: 45 | Refills: 3 | Status: ERX

## 2024-11-21 RX ORDER — UPADACITINIB 15 MG/1
TABLET, EXTENDED RELEASE ORAL
Qty: 60 | Refills: 10 | Status: ERX | COMMUNITY
Start: 2024-11-21

## 2024-11-21 RX ADMIN — Medication: at 00:00

## 2024-11-21 RX ADMIN — UPADACITINIB: 15 TABLET, EXTENDED RELEASE ORAL at 00:00

## 2024-11-21 ASSESSMENT — LOCATION SIMPLE DESCRIPTION DERM
LOCATION SIMPLE: RIGHT NOSE
LOCATION SIMPLE: RIGHT UPPER BACK
LOCATION SIMPLE: LEFT EYELID
LOCATION SIMPLE: LEFT UPPER BACK
LOCATION SIMPLE: RIGHT TEMPLE
LOCATION SIMPLE: UPPER BACK
LOCATION SIMPLE: NOSE
LOCATION SIMPLE: LEFT SHOULDER
LOCATION SIMPLE: LEFT CHEEK

## 2024-11-21 ASSESSMENT — LOCATION ZONE DERM
LOCATION ZONE: TRUNK
LOCATION ZONE: FACE
LOCATION ZONE: ARM
LOCATION ZONE: EYELID
LOCATION ZONE: NOSE
LOCATION ZONE: FACE

## 2024-11-21 ASSESSMENT — LOCATION DETAILED DESCRIPTION DERM
LOCATION DETAILED: LEFT SUPERIOR MEDIAL UPPER BACK
LOCATION DETAILED: RIGHT NASAL SIDEWALL
LOCATION DETAILED: LEFT INFERIOR MEDIAL MALAR CHEEK
LOCATION DETAILED: INFERIOR THORACIC SPINE
LOCATION DETAILED: LEFT ANTERIOR SHOULDER
LOCATION DETAILED: NASAL DORSUM
LOCATION DETAILED: RIGHT INFERIOR TEMPLE
LOCATION DETAILED: LEFT LATERAL CANTHUS
LOCATION DETAILED: NASAL SUPRATIP
LOCATION DETAILED: LEFT INFERIOR MEDIAL UPPER BACK
LOCATION DETAILED: RIGHT INFERIOR UPPER BACK
LOCATION DETAILED: LEFT CENTRAL MALAR CHEEK

## 2024-11-21 NOTE — PROCEDURE: RINVOQ MONITORING
Comments: Pt wants to take 15 mg BID instead of 30 mg QD
Current Dosage: 30mg Daily
Detail Level: Zone
Add High Risk Medication Management Associated Diagnosis?: No

## 2024-11-21 NOTE — PROCEDURE: PRESCRIPTION MEDICATION MANAGEMENT
Samples Given: Patient given 3 sample boxes of 15 mg Rinvoq tablets
Plan: Follow up in 3 months
Detail Level: Zone
Modify Regimen: Rinvoq — switch from 30 once a day to 15 mg BID
Render In Strict Bullet Format?: No
Continue Regimen: Minocycline 100mg at flare\\nBenzoyl peroxide cleanser
Initiate Treatment: Mometasone ointment — apply to affected areas around eyes once nightly

## 2024-12-05 ENCOUNTER — OFFICE VISIT (OUTPATIENT)
Dept: OBGYN CLINIC | Age: 24
End: 2024-12-05
Payer: COMMERCIAL

## 2024-12-05 VITALS
WEIGHT: 140 LBS | SYSTOLIC BLOOD PRESSURE: 122 MMHG | BODY MASS INDEX: 24.8 KG/M2 | DIASTOLIC BLOOD PRESSURE: 68 MMHG | HEIGHT: 63 IN

## 2024-12-05 DIAGNOSIS — N94.6 DYSMENORRHEA: ICD-10-CM

## 2024-12-05 DIAGNOSIS — N89.8 VAGINAL IRRITATION: ICD-10-CM

## 2024-12-05 DIAGNOSIS — R10.2 PELVIC PAIN: Primary | ICD-10-CM

## 2024-12-05 DIAGNOSIS — Z72.0 VAPES NICOTINE CONTAINING SUBSTANCE: ICD-10-CM

## 2024-12-05 DIAGNOSIS — N94.10 DYSPAREUNIA IN FEMALE: ICD-10-CM

## 2024-12-05 PROBLEM — N83.202 BILATERAL OVARIAN CYSTS: Status: RESOLVED | Noted: 2022-10-27 | Resolved: 2024-12-05

## 2024-12-05 PROBLEM — N83.201 BILATERAL OVARIAN CYSTS: Status: RESOLVED | Noted: 2022-10-27 | Resolved: 2024-12-05

## 2024-12-05 LAB
BILIRUBIN, URINE, POC: NEGATIVE
BLOOD URINE, POC: NEGATIVE
GLUCOSE URINE, POC: NEGATIVE
KETONES, URINE, POC: NEGATIVE
LEUKOCYTE ESTERASE, URINE, POC: NEGATIVE
NITRITE, URINE, POC: NEGATIVE
PH, URINE, POC: 6 (ref 4.6–8)
PROTEIN,URINE, POC: NEGATIVE
SPECIFIC GRAVITY, URINE, POC: 1.03 (ref 1–1.03)
URINALYSIS CLARITY, POC: CLEAR
URINALYSIS COLOR, POC: YELLOW
UROBILINOGEN, POC: NORMAL MG/DL

## 2024-12-05 PROCEDURE — 99459 PELVIC EXAMINATION: CPT | Performed by: OBSTETRICS & GYNECOLOGY

## 2024-12-05 PROCEDURE — 81002 URINALYSIS NONAUTO W/O SCOPE: CPT | Performed by: OBSTETRICS & GYNECOLOGY

## 2024-12-05 PROCEDURE — 99214 OFFICE O/P EST MOD 30 MIN: CPT | Performed by: OBSTETRICS & GYNECOLOGY

## 2024-12-05 PROCEDURE — 99406 BEHAV CHNG SMOKING 3-10 MIN: CPT | Performed by: OBSTETRICS & GYNECOLOGY

## 2024-12-05 NOTE — ASSESSMENT & PLAN NOTE
D/W pt at length neg effects of tobacco abuse/vaping including but not limited to: death, multiple forms of CA, COPD, CAD, vascular damage, etc.  Encouraged cessation and D/W her methods (tapering, counseling, nicotine patches/gums, etc.)  Approximately 4 minutes spent in face to face counseling

## 2024-12-05 NOTE — PATIENT INSTRUCTIONS
STOP VAPING!  We will call you if anything is abnormal from your testing today.  Follow up as needed or next year for your yearly female exam.  Thanks for coming to see us today and letting us take care of you!

## 2024-12-05 NOTE — ASSESSMENT & PLAN NOTE
Exam today essentially wnl, UA neg  D/W pt AGAIN that more likely than not endo/adenomyosis and OCPs for initial therapy has controlled symptoms for the majority of issues  D/W her that I rec cont OCPs and consideration of Dx lap if worsens

## 2024-12-08 LAB
A VAGINAE DNA VAG QL NAA+PROBE: NORMAL SCORE
BVAB2 DNA VAG QL NAA+PROBE: NORMAL SCORE
C TRACH RRNA SPEC QL NAA+PROBE: NEGATIVE
MEGA1 DNA VAG QL NAA+PROBE: NORMAL SCORE
N GONORRHOEA RRNA SPEC QL NAA+PROBE: NEGATIVE
SPECIMEN SOURCE: NORMAL
T VAGINALIS RRNA SPEC QL NAA+PROBE: NEGATIVE

## 2024-12-09 LAB
A VAGINAE DNA VAG QL NAA+PROBE: ABNORMAL SCORE
BVAB2 DNA VAG QL NAA+PROBE: ABNORMAL SCORE
C ALBICANS DNA VAG QL NAA+PROBE: POSITIVE
C GLABRATA DNA VAG QL NAA+PROBE: NEGATIVE
C TRACH RRNA SPEC QL NAA+PROBE: NEGATIVE
MEGA1 DNA VAG QL NAA+PROBE: ABNORMAL SCORE
N GONORRHOEA RRNA SPEC QL NAA+PROBE: NEGATIVE
SPECIMEN SOURCE: ABNORMAL
T VAGINALIS RRNA SPEC QL NAA+PROBE: NEGATIVE

## 2024-12-09 RX ORDER — FLUCONAZOLE 150 MG/1
150 TABLET ORAL
Qty: 2 TABLET | Refills: 0 | Status: SHIPPED | OUTPATIENT
Start: 2024-12-09 | End: 2024-12-15

## 2024-12-09 NOTE — TELEPHONE ENCOUNTER
Patient informed of results and recommendations. Rx pend to be sent. Patient voiced understanding. caty

## 2024-12-09 NOTE — TELEPHONE ENCOUNTER
----- Message from Dr. Dre Brooks MD sent at 12/9/2024 10:27 AM EST -----  Please let pt know that her nuswab showed yeast.  We can send in Diflucan or terazol for her for this

## 2024-12-20 ENCOUNTER — TELEPHONE (OUTPATIENT)
Dept: OBGYN CLINIC | Age: 24
End: 2024-12-20

## 2024-12-20 RX ORDER — TERCONAZOLE 4 MG/G
CREAM VAGINAL
Qty: 45 G | Refills: 0 | Status: SHIPPED | OUTPATIENT
Start: 2024-12-20

## 2024-12-20 NOTE — TELEPHONE ENCOUNTER
I have sent in Rx for terazol.  OCPs do not cause yeast infections.  If she is having intercourse before the treatment is complete, the yeast will be passed back and forth.  Diabetes can be linked to recurrent yeast also. Her past labs do not show this but she can see her PCP for consideration of work-up

## 2024-12-20 NOTE — TELEPHONE ENCOUNTER
Patient sent the message below:    Good Evening, I was seen recently and tested positive for a yeast infection and took the medicine as directed but it was only a 2 pill prescription and doesn’t seem to have gotten rid of the infection. I now have a chunky white discharge and didn’t before just had pain before when seen in the office so I’m not sure if it got worse or not. Do I need to come in for another visit or can I just get another prescription sent in to get rid of it? I had a 7 day version prescribed with past infections, but also I’ve been getting them frequently so I was wondering if it’s the birth control maybe causing them?       Patient was given diflucan on 12/09/2024.

## 2025-01-06 ENCOUNTER — OFFICE VISIT (OUTPATIENT)
Dept: FAMILY MEDICINE CLINIC | Facility: CLINIC | Age: 25
End: 2025-01-06
Payer: COMMERCIAL

## 2025-01-06 VITALS
BODY MASS INDEX: 23.39 KG/M2 | WEIGHT: 132 LBS | HEART RATE: 99 BPM | OXYGEN SATURATION: 98 % | TEMPERATURE: 97.9 F | HEIGHT: 63 IN | SYSTOLIC BLOOD PRESSURE: 96 MMHG | DIASTOLIC BLOOD PRESSURE: 62 MMHG

## 2025-01-06 DIAGNOSIS — J45.20 MILD INTERMITTENT ASTHMA WITHOUT COMPLICATION: ICD-10-CM

## 2025-01-06 DIAGNOSIS — F41.9 ANXIETY: ICD-10-CM

## 2025-01-06 DIAGNOSIS — Z00.00 WELL ADULT EXAM: Primary | ICD-10-CM

## 2025-01-06 DIAGNOSIS — L29.9 CHRONIC PRURITUS: ICD-10-CM

## 2025-01-06 DIAGNOSIS — G43.909 MIGRAINE WITHOUT STATUS MIGRAINOSUS, NOT INTRACTABLE, UNSPECIFIED MIGRAINE TYPE: ICD-10-CM

## 2025-01-06 DIAGNOSIS — F12.20 MARIJUANA DEPENDENCE (HCC): ICD-10-CM

## 2025-01-06 LAB
ALBUMIN SERPL-MCNC: 3.7 G/DL (ref 3.5–5)
ALBUMIN/GLOB SERPL: 1.3 (ref 1–1.9)
ALP SERPL-CCNC: 67 U/L (ref 35–104)
ALT SERPL-CCNC: 16 U/L (ref 8–45)
ANION GAP SERPL CALC-SCNC: 9 MMOL/L (ref 7–16)
AST SERPL-CCNC: 22 U/L (ref 15–37)
BASOPHILS # BLD: 0 K/UL (ref 0–0.2)
BASOPHILS NFR BLD: 0 % (ref 0–2)
BILIRUB SERPL-MCNC: 0.4 MG/DL (ref 0–1.2)
BUN SERPL-MCNC: 8 MG/DL (ref 6–23)
CALCIUM SERPL-MCNC: 9.1 MG/DL (ref 8.8–10.2)
CHLORIDE SERPL-SCNC: 107 MMOL/L (ref 98–107)
CHOLEST SERPL-MCNC: 132 MG/DL (ref 0–200)
CO2 SERPL-SCNC: 25 MMOL/L (ref 20–29)
CREAT SERPL-MCNC: 0.8 MG/DL (ref 0.6–1.1)
DIFFERENTIAL METHOD BLD: ABNORMAL
EOSINOPHIL # BLD: 0.6 K/UL (ref 0–0.8)
EOSINOPHIL NFR BLD: 8 % (ref 0.5–7.8)
ERYTHROCYTE [DISTWIDTH] IN BLOOD BY AUTOMATED COUNT: 12.5 % (ref 11.9–14.6)
EST. AVERAGE GLUCOSE BLD GHB EST-MCNC: 100 MG/DL
GLOBULIN SER CALC-MCNC: 2.8 G/DL (ref 2.3–3.5)
GLUCOSE SERPL-MCNC: 88 MG/DL (ref 70–99)
HBA1C MFR BLD: 5.1 % (ref 0–5.6)
HCT VFR BLD AUTO: 42.2 % (ref 35.8–46.3)
HDLC SERPL-MCNC: 58 MG/DL (ref 40–60)
HDLC SERPL: 2.3 (ref 0–5)
HGB BLD-MCNC: 13.5 G/DL (ref 11.7–15.4)
IMM GRANULOCYTES # BLD AUTO: 0.1 K/UL (ref 0–0.5)
IMM GRANULOCYTES NFR BLD AUTO: 1 % (ref 0–5)
LDLC SERPL CALC-MCNC: 64 MG/DL (ref 0–100)
LYMPHOCYTES # BLD: 2.6 K/UL (ref 0.5–4.6)
LYMPHOCYTES NFR BLD: 40 % (ref 13–44)
MCH RBC QN AUTO: 31 PG (ref 26.1–32.9)
MCHC RBC AUTO-ENTMCNC: 32 G/DL (ref 31.4–35)
MCV RBC AUTO: 97 FL (ref 82–102)
MONOCYTES # BLD: 0.5 K/UL (ref 0.1–1.3)
MONOCYTES NFR BLD: 8 % (ref 4–12)
NEUTS SEG # BLD: 2.8 K/UL (ref 1.7–8.2)
NEUTS SEG NFR BLD: 43 % (ref 43–78)
NRBC # BLD: 0 K/UL (ref 0–0.2)
PLATELET # BLD AUTO: 239 K/UL (ref 150–450)
PMV BLD AUTO: 11.4 FL (ref 9.4–12.3)
POTASSIUM SERPL-SCNC: 4 MMOL/L (ref 3.5–5.1)
PROT SERPL-MCNC: 6.6 G/DL (ref 6.3–8.2)
RBC # BLD AUTO: 4.35 M/UL (ref 4.05–5.2)
SODIUM SERPL-SCNC: 141 MMOL/L (ref 136–145)
TRIGL SERPL-MCNC: 50 MG/DL (ref 0–150)
TSH, 3RD GENERATION: 1.07 UIU/ML (ref 0.27–4.2)
VLDLC SERPL CALC-MCNC: 10 MG/DL (ref 6–23)
WBC # BLD AUTO: 6.6 K/UL (ref 4.3–11.1)

## 2025-01-06 PROCEDURE — 99214 OFFICE O/P EST MOD 30 MIN: CPT | Performed by: FAMILY MEDICINE

## 2025-01-06 PROCEDURE — 99395 PREV VISIT EST AGE 18-39: CPT | Performed by: FAMILY MEDICINE

## 2025-01-06 RX ORDER — ALBUTEROL SULFATE 90 UG/1
2 INHALANT RESPIRATORY (INHALATION) EVERY 4 HOURS PRN
Qty: 18 G | Refills: 3 | Status: SHIPPED | OUTPATIENT
Start: 2025-01-06

## 2025-01-06 RX ORDER — PREDNISONE 20 MG/1
20 TABLET ORAL DAILY
Qty: 5 TABLET | Refills: 0 | Status: SHIPPED | OUTPATIENT
Start: 2025-01-06 | End: 2025-01-11

## 2025-01-06 RX ORDER — RIZATRIPTAN BENZOATE 10 MG/1
TABLET ORAL
Qty: 18 TABLET | Refills: 5 | Status: SHIPPED | OUTPATIENT
Start: 2025-01-06

## 2025-01-06 ASSESSMENT — PATIENT HEALTH QUESTIONNAIRE - PHQ9
5. POOR APPETITE OR OVEREATING: NEARLY EVERY DAY
7. TROUBLE CONCENTRATING ON THINGS, SUCH AS READING THE NEWSPAPER OR WATCHING TELEVISION: SEVERAL DAYS
4. FEELING TIRED OR HAVING LITTLE ENERGY: NEARLY EVERY DAY
4. FEELING TIRED OR HAVING LITTLE ENERGY: NEARLY EVERY DAY
SUM OF ALL RESPONSES TO PHQ QUESTIONS 1-9: 10
10. IF YOU CHECKED OFF ANY PROBLEMS, HOW DIFFICULT HAVE THESE PROBLEMS MADE IT FOR YOU TO DO YOUR WORK, TAKE CARE OF THINGS AT HOME, OR GET ALONG WITH OTHER PEOPLE: SOMEWHAT DIFFICULT
2. FEELING DOWN, DEPRESSED OR HOPELESS: NOT AT ALL
8. MOVING OR SPEAKING SO SLOWLY THAT OTHER PEOPLE COULD HAVE NOTICED. OR THE OPPOSITE - BEING SO FIDGETY OR RESTLESS THAT YOU HAVE BEEN MOVING AROUND A LOT MORE THAN USUAL: NOT AT ALL
1. LITTLE INTEREST OR PLEASURE IN DOING THINGS: NOT AT ALL
SUM OF ALL RESPONSES TO PHQ QUESTIONS 1-9: 10
6. FEELING BAD ABOUT YOURSELF - OR THAT YOU ARE A FAILURE OR HAVE LET YOURSELF OR YOUR FAMILY DOWN: NOT AT ALL
3. TROUBLE FALLING OR STAYING ASLEEP: NEARLY EVERY DAY
8. MOVING OR SPEAKING SO SLOWLY THAT OTHER PEOPLE COULD HAVE NOTICED. OR THE OPPOSITE, BEING SO FIGETY OR RESTLESS THAT YOU HAVE BEEN MOVING AROUND A LOT MORE THAN USUAL: NOT AT ALL
2. FEELING DOWN, DEPRESSED OR HOPELESS: NOT AT ALL
10. IF YOU CHECKED OFF ANY PROBLEMS, HOW DIFFICULT HAVE THESE PROBLEMS MADE IT FOR YOU TO DO YOUR WORK, TAKE CARE OF THINGS AT HOME, OR GET ALONG WITH OTHER PEOPLE: SOMEWHAT DIFFICULT
5. POOR APPETITE OR OVEREATING: NEARLY EVERY DAY
7. TROUBLE CONCENTRATING ON THINGS, SUCH AS READING THE NEWSPAPER OR WATCHING TELEVISION: SEVERAL DAYS
9. THOUGHTS THAT YOU WOULD BE BETTER OFF DEAD, OR OF HURTING YOURSELF: NOT AT ALL
SUM OF ALL RESPONSES TO PHQ QUESTIONS 1-9: 10
1. LITTLE INTEREST OR PLEASURE IN DOING THINGS: NOT AT ALL
SUM OF ALL RESPONSES TO PHQ QUESTIONS 1-9: 10
3. TROUBLE FALLING OR STAYING ASLEEP: NEARLY EVERY DAY
SUM OF ALL RESPONSES TO PHQ QUESTIONS 1-9: 10
SUM OF ALL RESPONSES TO PHQ9 QUESTIONS 1 & 2: 0
6. FEELING BAD ABOUT YOURSELF - OR THAT YOU ARE A FAILURE OR HAVE LET YOURSELF OR YOUR FAMILY DOWN: NOT AT ALL
9. THOUGHTS THAT YOU WOULD BE BETTER OFF DEAD, OR OF HURTING YOURSELF: NOT AT ALL

## 2025-01-06 NOTE — PROGRESS NOTES
Andria Rene (: 2000) is a 24 y.o. female, established patient, here for evaluation of the following chief complaint(s):  Referral - General (Neurology for worsening headaches. ), Follow-up Chronic Condition (Wants thyroid check due to weight fluctuation/), and Annual Exam       ASSESSMENT/PLAN:  1. Well adult exam  2. Migraine without status migrainosus, not intractable, unspecified migraine type  -     rizatriptan (MAXALT) 10 MG tablet; Take 10 mg as needed for headache. if symptoms persist or return, may repeat dose after 2 hours. No more than two pills in 24 hour period., Disp-18 tablet, R-5Normal  -     CBC with Auto Differential; Future  -     Comprehensive Metabolic Panel; Future  -     Lipid Panel; Future  -     Hemoglobin A1C; Future  -     TSH; Future  3. Mild intermittent asthma without complication  -     albuterol sulfate HFA (PROVENTIL;VENTOLIN;PROAIR) 108 (90 Base) MCG/ACT inhaler; Inhale 2 puffs into the lungs every 4 hours as needed for Wheezing or Shortness of Breath, Disp-18 g, R-3Normal  -     CBC with Auto Differential; Future  -     Comprehensive Metabolic Panel; Future  -     Lipid Panel; Future  -     Hemoglobin A1C; Future  -     TSH; Future  4. Anxiety  -     CBC with Auto Differential; Future  -     Comprehensive Metabolic Panel; Future  -     Lipid Panel; Future  -     Hemoglobin A1C; Future  -     TSH; Future  5. Chronic pruritus  6. Marijuana dependence (HCC)  Anticipatory guidance given  Patient seen today for well adult exam, sees GYN for GYN exam,  Complaining of weight gain, chronic itching, concerns for thyroid,  Will check TSH today along with CBC, CMP, TSH, lipid profile, patient had urine screen for infections done by GYN,  Neurology referral was previously sent for headaches and patient is phone calls, provided phone number for patient to call neurology.  Refilled Maxalt and albuterol to mail order pharmacy,  Worsening eczema not improving with medications or

## 2025-01-27 ENCOUNTER — RX ONLY (RX ONLY)
Age: 25
End: 2025-01-27

## 2025-01-27 RX ORDER — UPADACITINIB 30 MG/1
TABLET, EXTENDED RELEASE ORAL
Qty: 30 | Refills: 2 | Status: ERX

## 2025-01-27 RX ORDER — UPADACITINIB 15 MG/1
TABLET, EXTENDED RELEASE ORAL
Qty: 60 | Refills: 10 | Status: ACTIVE

## 2025-02-03 ENCOUNTER — PATIENT MESSAGE (OUTPATIENT)
Dept: FAMILY MEDICINE CLINIC | Facility: CLINIC | Age: 25
End: 2025-02-03

## 2025-02-03 DIAGNOSIS — G43.909 MIGRAINE WITHOUT STATUS MIGRAINOSUS, NOT INTRACTABLE, UNSPECIFIED MIGRAINE TYPE: ICD-10-CM

## 2025-02-03 RX ORDER — RIZATRIPTAN BENZOATE 10 MG/1
TABLET ORAL
Qty: 18 TABLET | Refills: 5 | Status: CANCELLED | OUTPATIENT
Start: 2025-02-03

## 2025-02-03 NOTE — TELEPHONE ENCOUNTER
Rizatriptan 10 mg last filled on 01/06/25 with 18 tablets and 5 refills.  Patient should have refills. Patient notified.

## 2025-02-04 RX ORDER — RIZATRIPTAN BENZOATE 10 MG/1
TABLET ORAL
Qty: 90 TABLET | Refills: 0 | Status: SHIPPED | OUTPATIENT
Start: 2025-02-04

## 2025-02-04 NOTE — TELEPHONE ENCOUNTER
Patient is stating that the Maxalt was set to [facility] for a 30 day supply.  [facility] cancelled this because it was not a 90 day supply.      Will you please send this for a 90 day supply to [facility].     Sent to Dr. Sparrow.

## 2025-02-10 ENCOUNTER — RX ONLY (RX ONLY)
Age: 25
End: 2025-02-10

## 2025-02-10 RX ORDER — UPADACITINIB 30 MG/1
TABLET, EXTENDED RELEASE ORAL
Qty: 30 | Refills: 6 | Status: ERX

## 2025-03-27 RX ORDER — NORETHINDRONE ACETATE AND ETHINYL ESTRADIOL AND FERROUS FUMARATE 1MG-20(21)
1 KIT ORAL DAILY
Qty: 28 TABLET | Refills: 12 | OUTPATIENT
Start: 2025-03-27

## 2025-03-31 RX ORDER — NORETHINDRONE ACETATE AND ETHINYL ESTRADIOL AND FERROUS FUMARATE 1MG-20(21)
1 KIT ORAL DAILY
Qty: 1 PACKET | Refills: 12 | OUTPATIENT
Start: 2025-03-31

## 2025-03-31 RX ORDER — NORETHINDRONE ACETATE AND ETHINYL ESTRADIOL AND FERROUS FUMARATE 1MG-20(21)
1 KIT ORAL DAILY
Qty: 1 PACKET | Refills: 1 | Status: SHIPPED | OUTPATIENT
Start: 2025-03-31

## 2025-03-31 RX ORDER — NORETHINDRONE ACETATE AND ETHINYL ESTRADIOL AND FERROUS FUMARATE 1MG-20(21)
1 KIT ORAL DAILY
Qty: 28 TABLET | Refills: 12 | OUTPATIENT
Start: 2025-03-31

## 2025-04-18 ENCOUNTER — RX ONLY (RX ONLY)
Age: 25
End: 2025-04-18

## 2025-04-18 RX ORDER — UPADACITINIB 30 MG/1
TABLET, EXTENDED RELEASE ORAL
Qty: 30 | Refills: 11 | Status: ERX

## 2025-04-24 RX ORDER — NORETHINDRONE ACETATE AND ETHINYL ESTRADIOL AND FERROUS FUMARATE 1MG-20(21)
1 KIT ORAL DAILY
Qty: 28 TABLET | Refills: 12 | OUTPATIENT
Start: 2025-04-24

## 2025-05-06 ENCOUNTER — OFFICE VISIT (OUTPATIENT)
Dept: OBGYN CLINIC | Age: 25
End: 2025-05-06

## 2025-05-06 VITALS
WEIGHT: 128 LBS | BODY MASS INDEX: 22.68 KG/M2 | HEIGHT: 63 IN | SYSTOLIC BLOOD PRESSURE: 100 MMHG | DIASTOLIC BLOOD PRESSURE: 62 MMHG

## 2025-05-06 DIAGNOSIS — N94.6 DYSMENORRHEA: ICD-10-CM

## 2025-05-06 DIAGNOSIS — Z01.419 WOMEN'S ANNUAL ROUTINE GYNECOLOGICAL EXAMINATION: Primary | ICD-10-CM

## 2025-05-06 DIAGNOSIS — N94.10 DYSPAREUNIA IN FEMALE: ICD-10-CM

## 2025-05-06 DIAGNOSIS — Z72.0 VAPES NICOTINE CONTAINING SUBSTANCE: ICD-10-CM

## 2025-05-06 DIAGNOSIS — R10.2 PELVIC PAIN: ICD-10-CM

## 2025-05-06 PROBLEM — R74.01 TRANSAMINITIS: Status: RESOLVED | Noted: 2022-10-27 | Resolved: 2025-05-06

## 2025-05-06 PROBLEM — N89.8 VAGINAL IRRITATION: Status: RESOLVED | Noted: 2024-12-05 | Resolved: 2025-05-06

## 2025-05-06 PROBLEM — T81.49XA POSTOPERATIVE WOUND INFECTION: Status: RESOLVED | Noted: 2022-02-16 | Resolved: 2025-05-06

## 2025-05-06 RX ORDER — NORETHINDRONE ACETATE AND ETHINYL ESTRADIOL 1MG-20(21)
1 KIT ORAL DAILY
Qty: 1 PACKET | Refills: 12 | Status: SHIPPED | OUTPATIENT
Start: 2025-05-06

## 2025-05-06 NOTE — PROGRESS NOTES
[facility]  [address] [facility] [address] [facility], [place] [address]  [phone]    Dre Brooks MD, FACOG  Elaine Huertas [facility]      Assessment/Plan     Patient Active Problem List    Diagnosis Date Noted    Dysmenorrhea 12/02/2022     Priority: Medium     Overview Note:     noted       Assessment & Plan Note:     Signif improved with OCPs, wishes to cont - Rx refill sent      Dyspareunia in female 12/02/2022     Priority: Medium     Overview Note:     noted       Assessment & Plan Note:     Signif improved with OCPs, wishes to cont - Rx refill sent      Mild intermittent asthma without complication 10/27/2022     Priority: Medium    Marijuana dependence (HCC) 01/06/2025    Anxiety 01/06/2025    Migraine without status migrainosus, not intractable 01/06/2025    Vapes nicotine containing substance 12/05/2024     Overview Note:     noted       Assessment & Plan Note:     D/W pt at length neg effects of tobacco abuse, vaping including but not limited to: death, multiple forms of CA, COPD, CAD, vascular damage, etc.  Encouraged cessation and D/W her methods (tapering, counseling, nicotine patches/gums, etc.)  Approximately 4 minutes spent in face to face counseling       Pelvic pain 12/05/2024     Overview Note:     noted       Assessment & Plan Note:     Signif improved with OCPs, wishes to cont - Rx refill sent      Change in bowel habits 09/07/2023     Overview Note:     Added automatically from request for surgery [id number]      Blood in stool 09/07/2023     Overview Note:     Added automatically from request for surgery [id number]      Esophagitis, eosinophilic      Overview Note:     no current medications/infusions      Women's annual routine gynecological examination 05/16/2023     Overview Note:     Pap with reflex done 5/6/25       Assessment & Plan Note:     Exam as below  Encouraged annual exams with paps as indicated  Pt to F/U with PCP for all non-gyn health related issues       Wound infection

## 2025-05-06 NOTE — PROGRESS NOTES
Patient comes in today for AE.     LAST PAP:  05/16/2023 negative     LAST MAMMO:  never     LMP:  No LMP recorded. (Menstrual status: Chemically Induced).    BIRTH CONTROL:  OCP (estrogen/progesterone)    TOBACCO USE:  No , vape     FAMILY HISTORY OF:   Breast Cancer:  No   Ovarian Cancer:  No   Uterine Cancer:  No   Colon Cancer:  No    Vitals:    05/06/25 1114   BP: 100/62   BP Site: Left Upper Arm   Patient Position: Sitting   Weight: 58.1 kg (128 lb)   Height: 1.588 m (5' 2.5\")        Pippa Esparza MA  05/06/25  11:22 AM

## 2025-05-06 NOTE — PROGRESS NOTES
Chaperone for Intimate Exam     Chaperone was offer accepted as part of the rooming process    Chaperone: Pippa FOWLER

## 2025-05-10 ENCOUNTER — RESULTS FOLLOW-UP (OUTPATIENT)
Dept: OBGYN CLINIC | Age: 25
End: 2025-05-10

## 2025-05-10 LAB
C TRACH RRNA CVX QL NAA+PROBE: NEGATIVE
COLLECTION METHOD: NORMAL
CYTOLOGIST CVX/VAG CYTO: NORMAL
CYTOLOGY CVX/VAG DOC THIN PREP: NORMAL
HPV REFLEX: NORMAL
Lab: NORMAL
N GONORRHOEA RRNA CVX QL NAA+PROBE: NEGATIVE
PAP SOURCE: NORMAL
PATH REPORT.FINAL DX SPEC: NORMAL
STAT OF ADQ CVX/VAG CYTO-IMP: NORMAL
T VAGINALIS RRNA SPEC QL NAA+PROBE: NEGATIVE

## 2025-06-05 PROBLEM — Z01.419 WOMEN'S ANNUAL ROUTINE GYNECOLOGICAL EXAMINATION: Status: RESOLVED | Noted: 2023-05-16 | Resolved: 2025-06-05

## 2025-07-17 NOTE — PROCEDURE: HIGH RISK MEDICATION MONITORING
07/17/2025    Dear Bianca Weldon,    We are writing to express our heartfelt gratitude for choosing us as your healthcare provider and entrusting us with your well-being. Your health and satisfaction are our top priorities, and we are truly honored to have the opportunity to serve you.    At [facility], we strive to provide the best possible care and support for our patients. My assessment and recommendations are as follows:    Irritable bowel syndrome with diarrhea  -     Calprotectin, Stool; Future; Expected date: 07/17/2025  -     Stool Exam-Ova,Cysts,Parasites; Future; Expected date: 07/17/2025    Type 2 diabetes mellitus with microalbuminuria, with long-term current use of insulin    Gastroparesis due to DM    Gastroesophageal reflux disease without esophagitis    Nausea  -     Ambulatory referral/consult to Endo Procedure ; Future; Expected date: 07/18/2025      To support optimal gut health and maintain regular bowel movements, we recommend incorporating the following into your daily routine:    Psyllium Husk - A soluble fiber that aids digestion, promotes stool regularity, and supports overall gut health.  Magnesium Glycinate - Helps with muscle relaxation, including the intestinal muscles, to ease bowel movements and reduce cramping.  Ground Flaxseed - A rich source of fiber and omega-3 fatty acids that supports digestion and reduces inflammation.  Probiotics (Florastor or Align) - Supports a healthy gut microbiome by replenishing beneficial bacteria and promoting digestive balance.    Patients may adjust the frequency and dosage of these supplements based on their individual tolerance and response. If you experience loose stools or discomfort, you may reduce the dosage or take them less frequently. If you need additional digestive support, gradual increases may be beneficial.    It is essential to drink plenty of water throughout the day to ensure these supplements work effectively and to  "prevent constipation.     Additionally, lifestyle modifications can significantly improve symptoms of acid reflux. These include:  Maintaining a healthy weight  Elevating the head of the bed to reduce nighttime reflux  Avoiding trigger foods such as spicy, acidic, or fatty foods  Refraining from eating late meals and staying upright for at least 1.5 hours after eating    We genuinely value your feedback and believe that it plays a crucial role in our pursuit of excellence. We kindly request you to take a few minutes of your time to share your experience with us by posting a Google review. Your honest thoughts and opinions can make a significant difference for others seeking healthcare services and will help us better understand how we can further enhance our patient care.    Your kind words and positive reviews will not only motivate our dedicated team but will also inspire confidence in potential patients who are looking for [facility] healthcare services.    We genuinely value your feedback and believe that it plays a crucial role in our pursuit of excellence. We kindly request you to take a few minutes of your time to share your experience with us by posting a Google review. Your honest thoughts and opinions can make a significant difference for others seeking healthcare services and will help us better understand how we can further enhance our patient care.    Thank you for trusting us with your care,        Kris Virgen M.D.  click on the link for contact information.       PS: At [facility] we offer virtual visits. Please use your [facility] to schedule a virtual visit.     To rate your experience with Dr. Virgen, click on this link    To post a review, simply follow these quick steps:  1. Visit [employer] or search for my name on Google.  2. Click on the "Write a review" button on the left-hand side of the page.  3. Rate your experience by choosing the number of stars that reflect your satisfaction.  4. " Share your thoughts and insights about your visit - we'd love to hear your feedback!     Cosentyx Counseling:  I discussed with the patient the risks of Cosentyx including but not limited to worsening of Crohn's disease, immunosuppression, allergic reactions and infections.  The patient understands that monitoring is required including a PPD at baseline and must alert us or the primary physician if symptoms of infection or other concerning signs are noted.

## (undated) DEVICE — LOWER EXTREMITY: Brand: MEDLINE INDUSTRIES, INC.

## (undated) DEVICE — CANNULA NSL ORAL AD FOR CAPNOFLEX CO2 O2 AIRLFE

## (undated) DEVICE — CONNECTOR TBNG OD5-7MM O2 END DISP

## (undated) DEVICE — INTENDED FOR TISSUE SEPARATION, AND OTHER PROCEDURES THAT REQUIRE A SHARP SURGICAL BLADE TO PUNCTURE OR CUT.: Brand: BARD-PARKER ® STAINLESS STEEL BLADES

## (undated) DEVICE — SYRINGE MED 10ML LUERLOCK TIP W/O SFTY DISP

## (undated) DEVICE — PREP SKN CHLRAPRP APL 26ML STR --

## (undated) DEVICE — 7 DAY SILVER-COATED ANTIMICROBIAL BARRIER DRESSING: Brand: ACTICOAT 7  4" X 5"

## (undated) DEVICE — DRAPE SHT 3 QTR PROXIMA 53X77 --

## (undated) DEVICE — SINGLE PORT MANIFOLD: Brand: NEPTUNE 2

## (undated) DEVICE — ZIMMER® STERILE DISPOSABLE TOURNIQUET CUFF WITH PLC, DUAL PORT, SINGLE BLADDER, 30 IN. (76 CM)

## (undated) DEVICE — SYRINGE, LUER SLIP, STERILE, 60ML: Brand: MEDLINE

## (undated) DEVICE — SYRINGE MED 3ML CLR PLAS STD N CTRL LUERLOCK TIP DISP

## (undated) DEVICE — DRAPE C-ARMOUR C-ARM KIT --

## (undated) DEVICE — GAUZE,SPONGE,4"X4",16PLY,STRL,LF,10/TRAY: Brand: MEDLINE

## (undated) DEVICE — JAM SHIDI: Brand: XIA PRECISION SYSTEM

## (undated) DEVICE — PAD,ABDOMINAL,5"X9",ST,LF,25/BX: Brand: MEDLINE INDUSTRIES, INC.

## (undated) DEVICE — BUTTON SWITCH PENCIL BLADE ELECTRODE, HOLSTER: Brand: EDGE

## (undated) DEVICE — DRAPE,U/SHT,SPLIT,FILM,60X84,STERILE: Brand: MEDLINE

## (undated) DEVICE — AIRLIFE™ OXYGEN TUBING 7 FEET (2.1 M) CRUSH RESISTANT OXYGEN TUBING, VINYL TIPPED: Brand: AIRLIFE™

## (undated) DEVICE — 2.5MM DRILL BIT/QC/GOLD/110MM

## (undated) DEVICE — KIT COLON WITH 1.1 OZ ORCA HYDRA SEAL 2 GOWN ADAPT SECONDARY

## (undated) DEVICE — BLOCK BITE AD 60FR W/ VELC STRP ADDRESSES MOST PT AND

## (undated) DEVICE — GAUZE,SPONGE,4"X4",12PLY,WOVEN,NS,LF: Brand: MEDLINE

## (undated) DEVICE — CONTAINER FORMALIN PREFILLED 10% NBF 60ML

## (undated) DEVICE — GUARDIAN LVC: Brand: GUARDIAN

## (undated) DEVICE — Device

## (undated) DEVICE — SUTURE STRATAFIX SPRL SZ 3-0 L9IN ABSRB VLT FS L26MM 3/8 SXPD2B419

## (undated) DEVICE — AMD ANTIMICROBIAL GAUZE SPONGES,12 PLY USP TYPE VII, 0.2% POLYHEXAMETHYLENE BIGUANIDE HCI (PHMB): Brand: CURITY

## (undated) DEVICE — ENDOSCOPIC KIT 1.1+ OP4 CA DE 2 GWN AAMI LEVEL 3

## (undated) DEVICE — 3.7MM T-HANDLE REAMER: Brand: ACUMED

## (undated) DEVICE — REM POLYHESIVE ADULT PATIENT RETURN ELECTRODE: Brand: VALLEYLAB

## (undated) DEVICE — SOLUTION IRRIG 3000ML 0.9% SOD CHL FLX CONT 0797208] ICU MEDICAL INC]

## (undated) DEVICE — BIT DRL DIA2.8MM SHT CALIB QUIK CPL W/ STP PRO-PAK

## (undated) DEVICE — SYSTEM CULT COLL OR TRNSPRT CLR DBL SWAB W/ MOD AERB AMIES

## (undated) DEVICE — FORCEPS BX OVL CUP SERR DISP CAP L 240CM RAD JAW 4

## (undated) DEVICE — SOLUTION IV 1000ML 0.9% SOD CHL

## (undated) DEVICE — LUBE JELLY FOIL PACK 1.4 OZ: Brand: MEDLINE INDUSTRIES, INC.

## (undated) DEVICE — KIT INVISIKNOT ANKLE FRACTURE: Brand: INVISIKNOT

## (undated) DEVICE — .062" X 6" GUIDE WIRE: Brand: ACUMED

## (undated) DEVICE — YANKAUER,BULB TIP,W/O VENT,RIGID,STERILE: Brand: MEDLINE

## (undated) DEVICE — 3.1MM X 300MM IM ROD REAMER: Brand: ACUMED

## (undated) DEVICE — NEEDLE SYR 18GA L1.5IN RED PLAS HUB S STL BLNT FILL W/O

## (undated) DEVICE — FIBULA ROD INTRAMEDULLARY DRILL: Brand: ACUMED

## (undated) DEVICE — KENDALL RADIOLUCENT FOAM MONITORING ELECTRODE RECTANGULAR SHAPE: Brand: KENDALL